# Patient Record
Sex: FEMALE | Race: WHITE | Employment: UNEMPLOYED | URBAN - METROPOLITAN AREA
[De-identification: names, ages, dates, MRNs, and addresses within clinical notes are randomized per-mention and may not be internally consistent; named-entity substitution may affect disease eponyms.]

---

## 2017-10-26 PROCEDURE — 88175 CYTOPATH C/V AUTO FLUID REDO: CPT | Performed by: FAMILY MEDICINE

## 2018-10-10 NOTE — Clinical Note
I had the pleasure of seeing Camila Combs on 2/4/2020. Please see my attached note. Hoda Cedeño MD FACSEMG--Surgery done

## 2018-11-09 PROCEDURE — 88175 CYTOPATH C/V AUTO FLUID REDO: CPT | Performed by: FAMILY MEDICINE

## 2019-09-20 PROCEDURE — 88360 TUMOR IMMUNOHISTOCHEM/MANUAL: CPT | Performed by: RADIOLOGY

## 2019-09-20 PROCEDURE — 88344 IMHCHEM/IMCYTCHM EA MLT ANTB: CPT | Performed by: RADIOLOGY

## 2019-09-20 PROCEDURE — 88305 TISSUE EXAM BY PATHOLOGIST: CPT | Performed by: RADIOLOGY

## 2019-09-25 ENCOUNTER — TELEPHONE (OUTPATIENT)
Dept: HEMATOLOGY/ONCOLOGY | Facility: HOSPITAL | Age: 49
End: 2019-09-25

## 2019-09-25 NOTE — TELEPHONE ENCOUNTER
Pt called call center to schedule with Dr. Sapphire Krueger and genetics. Patient phoned back and discussed newly diagnosed breast cancer. Introduced myself and explained my role as the breast navigator.  Explained the role of the physicians on her breast cancer car

## 2019-09-26 ENCOUNTER — NURSE NAVIGATOR ENCOUNTER (OUTPATIENT)
Dept: HEMATOLOGY/ONCOLOGY | Facility: HOSPITAL | Age: 49
End: 2019-09-26

## 2019-09-26 ENCOUNTER — OFFICE VISIT (OUTPATIENT)
Dept: HEMATOLOGY/ONCOLOGY | Facility: HOSPITAL | Age: 49
End: 2019-09-26
Attending: SPECIALIST
Payer: COMMERCIAL

## 2019-09-26 ENCOUNTER — GENETICS ENCOUNTER (OUTPATIENT)
Dept: GENETICS | Facility: HOSPITAL | Age: 49
End: 2019-09-26
Attending: SPECIALIST
Payer: COMMERCIAL

## 2019-09-26 VITALS
RESPIRATION RATE: 16 BRPM | SYSTOLIC BLOOD PRESSURE: 129 MMHG | HEART RATE: 65 BPM | BODY MASS INDEX: 27.1 KG/M2 | OXYGEN SATURATION: 99 % | HEIGHT: 65.98 IN | WEIGHT: 168.63 LBS | TEMPERATURE: 97 F | DIASTOLIC BLOOD PRESSURE: 82 MMHG

## 2019-09-26 DIAGNOSIS — C50.412 MALIGNANT NEOPLASM OF UPPER-OUTER QUADRANT OF LEFT BREAST IN FEMALE, ESTROGEN RECEPTOR POSITIVE (HCC): Primary | ICD-10-CM

## 2019-09-26 DIAGNOSIS — Z17.0 MALIGNANT NEOPLASM OF UPPER-OUTER QUADRANT OF LEFT BREAST IN FEMALE, ESTROGEN RECEPTOR POSITIVE (HCC): Primary | ICD-10-CM

## 2019-09-26 LAB
ALBUMIN SERPL-MCNC: 4.2 G/DL (ref 3.4–5)
ALBUMIN/GLOB SERPL: 1.1 {RATIO} (ref 1–2)
ALP LIVER SERPL-CCNC: 62 U/L (ref 39–100)
ALT SERPL-CCNC: 25 U/L (ref 13–56)
ANION GAP SERPL CALC-SCNC: 8 MMOL/L (ref 0–18)
AST SERPL-CCNC: 26 U/L (ref 15–37)
BASOPHILS # BLD AUTO: 0.04 X10(3) UL (ref 0–0.2)
BASOPHILS NFR BLD AUTO: 0.7 %
BILIRUB SERPL-MCNC: 0.5 MG/DL (ref 0.1–2)
BRCA1 C.185DELAG BLD/T QL: 35.2 U/ML (ref ?–38)
BUN BLD-MCNC: 13 MG/DL (ref 7–18)
BUN/CREAT SERPL: 16.9 (ref 10–20)
CALCIUM BLD-MCNC: 8.9 MG/DL (ref 8.5–10.1)
CANCER AG15-3 SERPL-ACNC: 16 U/ML (ref ?–35)
CHLORIDE SERPL-SCNC: 104 MMOL/L (ref 98–112)
CO2 SERPL-SCNC: 28 MMOL/L (ref 21–32)
CREAT BLD-MCNC: 0.77 MG/DL (ref 0.55–1.02)
DEPRECATED RDW RBC AUTO: 40.6 FL (ref 35.1–46.3)
EOSINOPHIL # BLD AUTO: 0.09 X10(3) UL (ref 0–0.7)
EOSINOPHIL NFR BLD AUTO: 1.5 %
ERYTHROCYTE [DISTWIDTH] IN BLOOD BY AUTOMATED COUNT: 13.4 % (ref 11–15)
GLOBULIN PLAS-MCNC: 3.8 G/DL (ref 2.8–4.4)
GLUCOSE BLD-MCNC: 98 MG/DL (ref 70–99)
HCT VFR BLD AUTO: 40.7 % (ref 35–48)
HGB BLD-MCNC: 13.3 G/DL (ref 12–16)
IMM GRANULOCYTES # BLD AUTO: 0.02 X10(3) UL (ref 0–1)
IMM GRANULOCYTES NFR BLD: 0.3 %
LYMPHOCYTES # BLD AUTO: 1.5 X10(3) UL (ref 1–4)
LYMPHOCYTES NFR BLD AUTO: 25.7 %
M PROTEIN MFR SERPL ELPH: 8 G/DL (ref 6.4–8.2)
MCH RBC QN AUTO: 27.3 PG (ref 26–34)
MCHC RBC AUTO-ENTMCNC: 32.7 G/DL (ref 31–37)
MCV RBC AUTO: 83.4 FL (ref 80–100)
MONOCYTES # BLD AUTO: 0.41 X10(3) UL (ref 0.1–1)
MONOCYTES NFR BLD AUTO: 7 %
NEUTROPHILS # BLD AUTO: 3.77 X10 (3) UL (ref 1.5–7.7)
NEUTROPHILS # BLD AUTO: 3.77 X10(3) UL (ref 1.5–7.7)
NEUTROPHILS NFR BLD AUTO: 64.8 %
OSMOLALITY SERPL CALC.SUM OF ELEC: 290 MOSM/KG (ref 275–295)
PLATELET # BLD AUTO: 185 10(3)UL (ref 150–450)
POTASSIUM SERPL-SCNC: 3.6 MMOL/L (ref 3.5–5.1)
RBC # BLD AUTO: 4.88 X10(6)UL (ref 3.8–5.3)
SODIUM SERPL-SCNC: 140 MMOL/L (ref 136–145)
WBC # BLD AUTO: 5.8 X10(3) UL (ref 4–11)

## 2019-09-26 PROCEDURE — 99245 OFF/OP CONSLTJ NEW/EST HI 55: CPT | Performed by: SPECIALIST

## 2019-09-26 PROCEDURE — 96040 HC GENETIC COUNSELING EA 30 MIN: CPT | Performed by: GENETIC COUNSELOR, MS

## 2019-09-26 NOTE — PROGRESS NOTES
Patient is here today for Consult with Martha Diez for breast cancer. Patient denies pain. Medication list and medical history were reviewed and updated.      Education Record    Learner:  Patient and spouse    Disease / Diagnosis: Consult Breast    Barriers

## 2019-09-26 NOTE — PROGRESS NOTES
Referring Provider: Ezra Patel MD    Additional Provider: MD Shaina Garcia MD    Reason for Referral:  Katarina Grey was referred for genetic counseling because of a new diagnosis of breast cancer.   Ms. Adan Pelaez is a 53year-old woman and BRCA2, account for the majority of hereditary breast and ovarian cancer families.   Mutations in genes other than BRCA1/2, many of which now have medical management recommendations (e.g., CHEK2, NIGEL, RAD51C, RAD51D, PALB2) are identified in 3-10% of ind the gene involved. Medical recommendations for individuals with BRCA1/2 pathogenic variants were reviewed as an example (see below).  It was also explained that for some of the genes for which testing is available, the associated cancer risks have yet to be history is limited because she has no full siblings and both of her parents are only children. Genetic testing on Ms. Saintclair Krabbe for BRCA1/2 pathogenic variants as part of a multigene panel is indicated. At the conclusion of the counseling session Ms. Saintclair Krabbe

## 2019-09-26 NOTE — PROGRESS NOTES
[facility] Report of Consultation      Patient Name: Alissa Barillas   YOB: 1970  Medical Record Number: [id number]  Consulting Physician: Annamarie Daily. Noe Camarena M.D.    Referring Physician: Robert England MD    Date of Consultation: codeine [opioid analgesics]. Review of Systems   Constitutional      No fevers, chills, night sweats, excessive fatigue. Allergic/Immunologic No reactions. Eyes                   No significant visual changes.   ENMT                  No oral pain, s our discussions today. Laboratory   No results found for this or any previous visit (from the past 48 hour(s)). Radiology   09/09/2019:  Bilateral mammography - Breast Density B:  The breasts demonstrate scattered fibroglandular densities.  There is the left breast demonstrate the Q clip at the expectedlocation. IMPRESSION:Ultrasound-guided core needle biopsy of a 1 o'clock mass within the upper outer left breast.Pathology is pending and an addendum will be submitted with radiology pathology concordanc MRI confirms a small primary and no lymph node involvement, I will recommend initial surgery. If her tumor is larger and/or involves lymph nodes (biospy may be recommended), then initial chemotherapy will be advised.            Patient is scheduled to see chito

## 2019-09-27 ENCOUNTER — OFFICE VISIT (OUTPATIENT)
Dept: SURGERY | Facility: CLINIC | Age: 49
End: 2019-09-27
Payer: COMMERCIAL

## 2019-09-27 VITALS
HEIGHT: 65.98 IN | WEIGHT: 168 LBS | BODY MASS INDEX: 27 KG/M2 | DIASTOLIC BLOOD PRESSURE: 82 MMHG | HEART RATE: 71 BPM | SYSTOLIC BLOOD PRESSURE: 130 MMHG | TEMPERATURE: 98 F

## 2019-09-27 DIAGNOSIS — Z17.0 MALIGNANT NEOPLASM OF UPPER-OUTER QUADRANT OF LEFT BREAST IN FEMALE, ESTROGEN RECEPTOR POSITIVE (HCC): Primary | ICD-10-CM

## 2019-09-27 DIAGNOSIS — C50.412 MALIGNANT NEOPLASM OF UPPER-OUTER QUADRANT OF LEFT BREAST IN FEMALE, ESTROGEN RECEPTOR POSITIVE (HCC): Primary | ICD-10-CM

## 2019-09-27 PROCEDURE — 99245 OFF/OP CONSLTJ NEW/EST HI 55: CPT | Performed by: SURGERY

## 2019-09-27 NOTE — H&P (VIEW-ONLY)
New Patient Visit Note       Active Problems      1.  Malignant neoplasm of upper-outer quadrant of left breast in female, estrogen receptor positive [facility])        Chief Complaint   Patient presents with:  Breast Problem: NP breast cancer, states left breast LEEP after abnormal colposcopy, HPV+       The family history and social history have been reviewed by me today. Family History   Problem Relation Age of Onset   • Heart Disorder Father         CAD?    • Diabetes Mother    • Hypertension Mother    • [place] [employer] Skin: Negative for color change and rash. Neurological: Negative for tremors, syncope and weakness. Hematological: Negative for adenopathy. Does not bruise/bleed easily. Psychiatric/Behavioral: Negative for behavioral problems and sleep disturbance. Right axillary: No pectoral and no lateral adenopathy present. Left axillary: No pectoral and no lateral adenopathy present. Right: No supraclavicular adenopathy present. Left: No supraclavicular adenopathy present.    Neurological: there is a 0.7 x 0.3 x 0.7 cm oval, circumscribed, hypoechoic parallel mass with internal  echogenicity, possibly calcification versus debris. Visualized axillary lymph nodes demonstrate  benign appearance.   PRE-PROCEDURAL CONSULTATION: Details of the proc Estrogen Receptor   -   SP1 85% Positive Cells Strong Positive   Progesterone Receptor   -   16 75% Positive Cells Strong Positive   KI-67   -   MM1 20% Positive Cells High   Her2   -   CB11 3+  (90%) Positive          Assessment   Malignant neoplasm of up We discussed the role of radiation therapy and if she opts for lumpectomy, she will need radiation therapy and the length and course of radiation therapy will depend on the final pathology.   We also discussed situations where radiation therapy is needed af

## 2019-09-27 NOTE — H&P
New Patient Visit Note       Active Problems      1.  Malignant neoplasm of upper-outer quadrant of left breast in female, estrogen receptor positive [facility])        Chief Complaint   Patient presents with:  Breast Problem: NP breast cancer, states left breast abnormal colposcopy, HPV+       The family history and social history have been reviewed by me today. Family History   Problem Relation Age of Onset   • Heart Disorder Father         CAD?    • Diabetes Mother    • Hypertension Mother    • Other (Hypothyr and rash. Neurological: Negative for tremors, syncope and weakness. Hematological: Negative for adenopathy. Does not bruise/bleed easily. Psychiatric/Behavioral: Negative for behavioral problems and sleep disturbance.        Physical Findings    axillary: No pectoral and no lateral adenopathy present. Right: No supraclavicular adenopathy present. Left: No supraclavicular adenopathy present. Neurological: She is alert and oriented to person, place, and time. Skin: Skin is intact.  Esther Oquendo internal  echogenicity, possibly calcification versus debris. Visualized axillary lymph nodes demonstrate  benign appearance.   PRE-PROCEDURAL CONSULTATION: Details of the procedure and possible limitations and complications  were discussed with the patient Positive   KI-67   -   MM1 20% Positive Cells High   Her2   -   CB11 3+  (90%) Positive          Assessment   Malignant neoplasm of upper-outer quadrant of left breast in female, estrogen receptor positive ([facility] [address]  (primary encounter diagnosis)      Plan   I determine if she would benefit from neoadjuvant chemotherapy or chemotherapy after surgery. Because she is HER-2 positive, she will require some form of chemotherapy. Due to her age, she does qualify for genetic testing.   She has already met with the

## 2019-09-30 ENCOUNTER — TELEPHONE (OUTPATIENT)
Dept: SURGERY | Facility: CLINIC | Age: 49
End: 2019-09-30

## 2019-09-30 NOTE — TELEPHONE ENCOUNTER
Pt. Came into the clinic stating that she didn't have an appointment today, but she wanted to inform our office about the chest pressure that she has been having. I asked Pt. If she has shortness of breath, dizzy or lightheaded.  Pt. Sasha Jacobs and stated that

## 2019-10-01 ENCOUNTER — OFFICE VISIT (OUTPATIENT)
Dept: SURGERY | Facility: CLINIC | Age: 49
End: 2019-10-01
Payer: COMMERCIAL

## 2019-10-01 ENCOUNTER — NURSE NAVIGATOR ENCOUNTER (OUTPATIENT)
Dept: HEMATOLOGY/ONCOLOGY | Facility: HOSPITAL | Age: 49
End: 2019-10-01

## 2019-10-01 VITALS — BODY MASS INDEX: 27.19 KG/M2 | WEIGHT: 169.19 LBS | HEIGHT: 65.98 IN

## 2019-10-01 DIAGNOSIS — Z51.81 ENCOUNTER FOR MONITORING CARDIOTOXIC DRUG THERAPY: Primary | ICD-10-CM

## 2019-10-01 DIAGNOSIS — C50.412 MALIGNANT NEOPLASM OF UPPER-OUTER QUADRANT OF LEFT BREAST IN FEMALE, ESTROGEN RECEPTOR POSITIVE (HCC): Primary | ICD-10-CM

## 2019-10-01 DIAGNOSIS — Z79.899 ENCOUNTER FOR MONITORING CARDIOTOXIC DRUG THERAPY: Primary | ICD-10-CM

## 2019-10-01 DIAGNOSIS — Z17.0 MALIGNANT NEOPLASM OF UPPER-OUTER QUADRANT OF LEFT BREAST IN FEMALE, ESTROGEN RECEPTOR POSITIVE (HCC): Primary | ICD-10-CM

## 2019-10-01 PROCEDURE — 99243 OFF/OP CNSLTJ NEW/EST LOW 30: CPT | Performed by: SURGERY

## 2019-10-01 NOTE — PROGRESS NOTES
Met with patient following plastic surgery appointment. We did discuss her in conference this morning, for review of MRI. Based on report, it looks like we need to look at the right side by US and possible biopsy to ensure a benign process on R side.  Our r

## 2019-10-01 NOTE — CONSULTS
New Patient Consultation    This is the first visit for this 53year old female who presents to discuss reconstructive options following surgery for breast cancer. History of Present Illness:    The patient is a 53year old female who presents with a lef ONLY      Family History:   Family History   Problem Relation Age of Onset   • Heart Disorder Father         CHF   • Diabetes Mother    • Hypertension Mother    • Other (Hypothyroidism) Mother    • Psychiatric Daughter 16        Depression/anxiety   • Othe blood in urine, or vaginal/penile discharge. Skin:  Then patient denies rash, itching, skin lesions, dry skin, change in skin color or change in moles, sunburn with blistering.     Hematologic/Lymphatic:  The patient denies easily bruising or bleeding, p recent biopsy. Palpation is limited secondary to discomfort. Measurements:  sternal notch to nipple 29cm, nipple to inframammary fold 14cm, base diameter 16cm. The skin, nipple, and areola appear normal.  There is no nipple retraction or discharge. exchange, and need for further surgery were reviewed. The expected post-operative course was discussed including the need for activity limitation, drains, and suture removal.  The recommended FDA surveillance protocol for silicone implants was reviewed.   Rukhsana Calloway

## 2019-10-02 ENCOUNTER — TELEPHONE (OUTPATIENT)
Dept: SURGERY | Facility: CLINIC | Age: 49
End: 2019-10-02

## 2019-10-02 ENCOUNTER — TELEPHONE (OUTPATIENT)
Dept: HEMATOLOGY/ONCOLOGY | Facility: HOSPITAL | Age: 49
End: 2019-10-02

## 2019-10-02 ENCOUNTER — TELEPHONE (OUTPATIENT)
Dept: MAMMOGRAPHY | Facility: HOSPITAL | Age: 49
End: 2019-10-02

## 2019-10-02 DIAGNOSIS — R92.8 ABNORMAL FINDINGS ON DIAGNOSTIC IMAGING OF BREAST: ICD-10-CM

## 2019-10-02 DIAGNOSIS — C50.412 MALIGNANT NEOPLASM OF UPPER-OUTER QUADRANT OF LEFT BREAST IN FEMALE, ESTROGEN RECEPTOR POSITIVE (HCC): Primary | ICD-10-CM

## 2019-10-02 DIAGNOSIS — Z17.0 MALIGNANT NEOPLASM OF UPPER-OUTER QUADRANT OF LEFT BREAST IN FEMALE, ESTROGEN RECEPTOR POSITIVE (HCC): Primary | ICD-10-CM

## 2019-10-02 NOTE — TELEPHONE ENCOUNTER
I reviewed the patient's MRI with Dr. Minor Ghsoh from radiology. His recommendation is for a 2 site right breast MRI guided biopsy. He does not feel the second look ultrasound would be of much benefit.   I discussed this with the patient, and she is agreeab

## 2019-10-02 NOTE — TELEPHONE ENCOUNTER
Spoke with patient regarding 2 site MRI guided right breast biopsy due Friday. Procedure explained and all questions answered. Pt verbalized understanding. Pt has a known LB IDC.

## 2019-10-03 ENCOUNTER — GENETICS ENCOUNTER (OUTPATIENT)
Dept: HEMATOLOGY/ONCOLOGY | Facility: HOSPITAL | Age: 49
End: 2019-10-03

## 2019-10-03 ENCOUNTER — HOSPITAL ENCOUNTER (OUTPATIENT)
Dept: CV DIAGNOSTICS | Facility: HOSPITAL | Age: 49
Discharge: HOME | End: 2019-10-03
Attending: SPECIALIST
Payer: COMMERCIAL

## 2019-10-03 DIAGNOSIS — Z51.81 ENCOUNTER FOR MONITORING CARDIOTOXIC DRUG THERAPY: ICD-10-CM

## 2019-10-03 DIAGNOSIS — Z79.899 ENCOUNTER FOR MONITORING CARDIOTOXIC DRUG THERAPY: ICD-10-CM

## 2019-10-03 PROCEDURE — 93306 TTE W/DOPPLER COMPLETE: CPT | Performed by: SPECIALIST

## 2019-10-03 NOTE — PROGRESS NOTES
Referring Provider:       Colette Leal MD     Additional Provider:     MD Ana Arenas MD    Reason for Referral:  Balta Silver had genetic testing performed on 9/26/19 because of a new diagnosi MEN1, MLH1, MSH2 (including EPCAM rearrangement testing), MSH3, MSH6, MUTYH, NBN, NF1, NTHL1 (sequencing only), PALB2, PDGRFA, PMS2, POLD1, POLE, PTEN, RAD50, RAD51C, RAD51D, SDHA (sequencing only), SDHB, SDHC, SDHD, SMAD4, SMARCA4, STK11, TP53, TSC1, TSC2

## 2019-10-04 ENCOUNTER — HOSPITAL ENCOUNTER (OUTPATIENT)
Dept: MAMMOGRAPHY | Facility: HOSPITAL | Age: 49
Discharge: HOME | End: 2019-10-04
Attending: SURGERY
Payer: COMMERCIAL

## 2019-10-04 ENCOUNTER — HOSPITAL ENCOUNTER (OUTPATIENT)
Dept: MRI IMAGING | Facility: HOSPITAL | Age: 49
Discharge: HOME | End: 2019-10-04
Attending: SURGERY
Payer: COMMERCIAL

## 2019-10-04 DIAGNOSIS — Z17.0 MALIGNANT NEOPLASM OF UPPER-OUTER QUADRANT OF LEFT BREAST IN FEMALE, ESTROGEN RECEPTOR POSITIVE (HCC): ICD-10-CM

## 2019-10-04 DIAGNOSIS — C50.412 MALIGNANT NEOPLASM OF UPPER-OUTER QUADRANT OF LEFT BREAST IN FEMALE, ESTROGEN RECEPTOR POSITIVE (HCC): ICD-10-CM

## 2019-10-04 DIAGNOSIS — R92.8 ABNORMAL FINDINGS ON DIAGNOSTIC IMAGING OF BREAST: ICD-10-CM

## 2019-10-04 PROCEDURE — 19086 BX BREAST ADD LESION MR IMAG: CPT | Performed by: SURGERY

## 2019-10-04 PROCEDURE — 19085 BX BREAST 1ST LESION MR IMAG: CPT | Performed by: SURGERY

## 2019-10-04 PROCEDURE — A9575 INJ GADOTERATE MEGLUMI 0.1ML: HCPCS | Performed by: SURGERY

## 2019-10-04 PROCEDURE — 88305 TISSUE EXAM BY PATHOLOGIST: CPT | Performed by: SURGERY

## 2019-10-04 PROCEDURE — 77065 DX MAMMO INCL CAD UNI: CPT | Performed by: SURGERY

## 2019-10-04 NOTE — IMAGING NOTE
Pt arrived with in laws. Procedure explained throughout and all questions answered. Consent and discharge paperwork signed by Gibson Standard. IV placed to right antecubital without difficulty. Pt placed prone in comfortable position on MRI table.  Right b

## 2019-10-07 ENCOUNTER — SOCIAL WORK SERVICES (OUTPATIENT)
Dept: HEMATOLOGY/ONCOLOGY | Facility: HOSPITAL | Age: 49
End: 2019-10-07

## 2019-10-07 ENCOUNTER — TELEPHONE (OUTPATIENT)
Dept: SURGERY | Facility: CLINIC | Age: 49
End: 2019-10-07

## 2019-10-07 DIAGNOSIS — C50.912 MALIGNANT NEOPLASM OF LEFT FEMALE BREAST, UNSPECIFIED ESTROGEN RECEPTOR STATUS, UNSPECIFIED SITE OF BREAST (HCC): Primary | ICD-10-CM

## 2019-10-07 NOTE — PROGRESS NOTES
SW completed and faxed paperwork to [facility] after speaking with patient to confirm intermittent leave dates. Paperwork faxed to [facility] at [phone].

## 2019-10-09 ENCOUNTER — NURSE ONLY (OUTPATIENT)
Dept: HEMATOLOGY/ONCOLOGY | Facility: HOSPITAL | Age: 49
End: 2019-10-09
Attending: SPECIALIST
Payer: COMMERCIAL

## 2019-10-09 ENCOUNTER — TELEPHONE (OUTPATIENT)
Dept: SURGERY | Facility: CLINIC | Age: 49
End: 2019-10-09

## 2019-10-09 DIAGNOSIS — C50.412 MALIGNANT NEOPLASM OF UPPER-OUTER QUADRANT OF LEFT BREAST IN FEMALE, ESTROGEN RECEPTOR POSITIVE (HCC): Primary | ICD-10-CM

## 2019-10-09 DIAGNOSIS — Z17.0 MALIGNANT NEOPLASM OF UPPER-OUTER QUADRANT OF LEFT BREAST IN FEMALE, ESTROGEN RECEPTOR POSITIVE (HCC): Primary | ICD-10-CM

## 2019-10-09 PROCEDURE — 99211 OFF/OP EST MAY X REQ PHY/QHP: CPT

## 2019-10-09 RX ORDER — PROCHLORPERAZINE MALEATE 10 MG
10 TABLET ORAL EVERY 6 HOURS PRN
Qty: 30 TABLET | Refills: 3 | Status: SHIPPED | OUTPATIENT
Start: 2019-10-09 | End: 2020-02-25 | Stop reason: ALTCHOICE

## 2019-10-09 RX ORDER — ONDANSETRON HYDROCHLORIDE 8 MG/1
8 TABLET, FILM COATED ORAL EVERY 8 HOURS PRN
Qty: 30 TABLET | Refills: 3 | Status: SHIPPED | OUTPATIENT
Start: 2019-10-09 | End: 2020-02-25 | Stop reason: ALTCHOICE

## 2019-10-09 NOTE — PROGRESS NOTES
Chemotherapy Education    Learner:  Patient    Barriers / Limitations:  Denial    Chemotherapy education goals:  · Learn the drug names  · Administration schedule  · Routes of administration  · Treatment setting    Drug names:  Doxetaxel, carboplatin, tras the Bladder and Kidneys:    Function of the kidneys and bladder:  Needs reinforcement    Signs / symptoms of hemorrhagic cystitis:  Needs reinforcement    Suggested fluid intake:  Needs reinforcement    Notify MD/RN if blood appears in urine or if you have Sheet  [employer] Information sheet    Patient was given ample opportunity to ask questions. All questions and concerns addressed. We discussed self care techniques, symptom management, fluid, diet, and activities.

## 2019-10-10 ENCOUNTER — HOSPITAL ENCOUNTER (OUTPATIENT)
Facility: HOSPITAL | Age: 49
Setting detail: HOSPITAL OUTPATIENT SURGERY
Discharge: HOME | End: 2019-10-10
Attending: SURGERY | Admitting: SURGERY
Payer: COMMERCIAL

## 2019-10-10 ENCOUNTER — APPOINTMENT (OUTPATIENT)
Dept: GENERAL RADIOLOGY | Facility: HOSPITAL | Age: 49
End: 2019-10-10
Attending: SURGERY
Payer: COMMERCIAL

## 2019-10-10 ENCOUNTER — ANESTHESIA EVENT (OUTPATIENT)
Dept: SURGERY | Facility: HOSPITAL | Age: 49
End: 2019-10-10
Payer: COMMERCIAL

## 2019-10-10 ENCOUNTER — ANESTHESIA (OUTPATIENT)
Dept: SURGERY | Facility: HOSPITAL | Age: 49
End: 2019-10-10
Payer: COMMERCIAL

## 2019-10-10 VITALS
WEIGHT: 164 LBS | SYSTOLIC BLOOD PRESSURE: 115 MMHG | BODY MASS INDEX: 26.36 KG/M2 | HEIGHT: 66 IN | RESPIRATION RATE: 16 BRPM | DIASTOLIC BLOOD PRESSURE: 73 MMHG | OXYGEN SATURATION: 96 % | HEART RATE: 67 BPM | TEMPERATURE: 98 F

## 2019-10-10 DIAGNOSIS — C50.912 MALIGNANT NEOPLASM OF LEFT FEMALE BREAST, UNSPECIFIED ESTROGEN RECEPTOR STATUS, UNSPECIFIED SITE OF BREAST (HCC): ICD-10-CM

## 2019-10-10 PROCEDURE — 71045 X-RAY EXAM CHEST 1 VIEW: CPT | Performed by: SURGERY

## 2019-10-10 PROCEDURE — 81025 URINE PREGNANCY TEST: CPT | Performed by: SURGERY

## 2019-10-10 PROCEDURE — 77001 FLUOROGUIDE FOR VEIN DEVICE: CPT | Performed by: SURGERY

## 2019-10-10 PROCEDURE — 0JH60WZ INSERTION OF TOTALLY IMPLANTABLE VASCULAR ACCESS DEVICE INTO CHEST SUBCUTANEOUS TISSUE AND FASCIA, OPEN APPROACH: ICD-10-PCS | Performed by: SURGERY

## 2019-10-10 PROCEDURE — 05H533Z INSERTION OF INFUSION DEVICE INTO RIGHT SUBCLAVIAN VEIN, PERCUTANEOUS APPROACH: ICD-10-PCS | Performed by: SURGERY

## 2019-10-10 PROCEDURE — B5161ZA FLUOROSCOPY OF RIGHT SUBCLAVIAN VEIN USING LOW OSMOLAR CONTRAST, GUIDANCE: ICD-10-PCS | Performed by: SURGERY

## 2019-10-10 DEVICE — POWERPORT CLEARVUE SLIM WITH SMOOTH SEPTUM, 8F POLYURETHANE CATHETER, SUTURE PLUGS, INTERMEDIATE KIT
Type: IMPLANTABLE DEVICE | Site: CHEST | Status: FUNCTIONAL
Brand: POWERPORT CLEARVUE

## 2019-10-10 RX ORDER — CEFAZOLIN SODIUM/WATER 2 G/20 ML
2 SYRINGE (ML) INTRAVENOUS ONCE
Status: COMPLETED | OUTPATIENT
Start: 2019-10-10 | End: 2019-10-10

## 2019-10-10 RX ORDER — HYDROCODONE BITARTRATE AND ACETAMINOPHEN 5; 325 MG/1; MG/1
2 TABLET ORAL AS NEEDED
Status: DISCONTINUED | OUTPATIENT
Start: 2019-10-10 | End: 2019-10-10

## 2019-10-10 RX ORDER — METOCLOPRAMIDE HYDROCHLORIDE 5 MG/ML
10 INJECTION INTRAMUSCULAR; INTRAVENOUS AS NEEDED
Status: DISCONTINUED | OUTPATIENT
Start: 2019-10-10 | End: 2019-10-10

## 2019-10-10 RX ORDER — ONDANSETRON 2 MG/ML
4 INJECTION INTRAMUSCULAR; INTRAVENOUS AS NEEDED
Status: DISCONTINUED | OUTPATIENT
Start: 2019-10-10 | End: 2019-10-10

## 2019-10-10 RX ORDER — HYDROCODONE BITARTRATE AND ACETAMINOPHEN 5; 325 MG/1; MG/1
1 TABLET ORAL AS NEEDED
Status: DISCONTINUED | OUTPATIENT
Start: 2019-10-10 | End: 2019-10-10

## 2019-10-10 RX ORDER — NALOXONE HYDROCHLORIDE 0.4 MG/ML
80 INJECTION, SOLUTION INTRAMUSCULAR; INTRAVENOUS; SUBCUTANEOUS AS NEEDED
Status: DISCONTINUED | OUTPATIENT
Start: 2019-10-10 | End: 2019-10-10

## 2019-10-10 RX ORDER — ACETAMINOPHEN 500 MG
1000 TABLET ORAL ONCE
Status: DISCONTINUED | OUTPATIENT
Start: 2019-10-10 | End: 2019-10-10 | Stop reason: HOSPADM

## 2019-10-10 RX ORDER — SODIUM CHLORIDE, SODIUM LACTATE, POTASSIUM CHLORIDE, CALCIUM CHLORIDE 600; 310; 30; 20 MG/100ML; MG/100ML; MG/100ML; MG/100ML
INJECTION, SOLUTION INTRAVENOUS CONTINUOUS
Status: DISCONTINUED | OUTPATIENT
Start: 2019-10-10 | End: 2019-10-10

## 2019-10-10 RX ORDER — BUPIVACAINE HYDROCHLORIDE AND EPINEPHRINE 5; 5 MG/ML; UG/ML
INJECTION, SOLUTION EPIDURAL; INTRACAUDAL; PERINEURAL AS NEEDED
Status: DISCONTINUED | OUTPATIENT
Start: 2019-10-10 | End: 2019-10-10 | Stop reason: HOSPADM

## 2019-10-10 RX ORDER — LIDOCAINE HYDROCHLORIDE 10 MG/ML
INJECTION, SOLUTION INFILTRATION; PERINEURAL AS NEEDED
Status: DISCONTINUED | OUTPATIENT
Start: 2019-10-10 | End: 2019-10-10 | Stop reason: HOSPADM

## 2019-10-10 NOTE — ANESTHESIA POSTPROCEDURE EVALUATION
[address] [facility] Patient Status:  Hospital Outpatient Surgery   Age/Gender 53year old female MRN [id number]   [facility] SURGERY Attending Chioma Dhillon MD   Hosp Day # 0 PCP Shelly Petersen MD       Anesthesia Pos

## 2019-10-10 NOTE — INTERVAL H&P NOTE
Pre-op Diagnosis: Malignant neoplasm of left female breast, unspecified estrogen receptor status, unspecified site of breast [facility] [C50.912]    The above referenced H&P was reviewed by Antonio Durant MD on 10/10/2019, the patient was examined and no s

## 2019-10-10 NOTE — OPERATIVE REPORT
DATE OF OPERATION:  10/10/2019  PREOPERATIVE DIAGNOSIS: Left breast invasive ductal carcinoma, HER-2 positive, upper outer quadrant  POSTOPERATIVE DIAGNOSIS:  Left breast invasive ductal carcinoma, HER-2 positive, upper outer quadrant   PROCEDURE PERFORMED were passed on the field and passed down the wire. The dilator and wire were then withdrawn, leaving the sheath in place. The catheter, which had already been flushed with heplock, was passed down the sheath.  The sheath was broken away, leaving the cathete

## 2019-10-10 NOTE — ANESTHESIA PREPROCEDURE EVALUATION
PRE-OP EVALUATION    Patient Name: Nino Oneil    Pre-op Diagnosis: Malignant neoplasm of left female breast, unspecified estrogen receptor status, unspecified site of [facility] ([facility] [C50.912]    Procedure(s):  PLACEMENT OF RIGHT SIDE PILY CATH    Surg Pulmonary                           Neuro/Psych      (+) depression                        Patient Active Problem List:     Hypothyroidism     HDL lipoprotein deficiency     Depression     Chronic neck pain     Malignant neoplasm of upper-outer quadrant

## 2019-10-10 NOTE — OR NURSING
Instructions given and questions addressed. Power port info packet given and power port sticker put into chart record. Iv removed with cannula intact. Voided 1x.

## 2019-10-11 ENCOUNTER — TELEPHONE (OUTPATIENT)
Dept: HEMATOLOGY/ONCOLOGY | Facility: HOSPITAL | Age: 49
End: 2019-10-11

## 2019-10-11 RX ORDER — DIPHENHYDRAMINE HCL 25 MG
25 CAPSULE ORAL ONCE
Status: CANCELLED | OUTPATIENT
Start: 2019-10-14

## 2019-10-11 RX ORDER — ACETAMINOPHEN 325 MG/1
650 TABLET ORAL ONCE
Status: CANCELLED | OUTPATIENT
Start: 2019-10-14

## 2019-10-11 NOTE — TELEPHONE ENCOUNTER
Spoke with patient in regards to flu shot, okay to get today per Dr. Sapphire Krueger. Pt verbalizes understanding. First chemotherapy scheduled for Monday.

## 2019-10-11 NOTE — PROGRESS NOTES
[facility] [place] Progress Note      Patient Name: Eris Recinos   YOB: 1970  Medical Record Number: [id number]  Attending Physician: Zhane Norris M.D.      Date of Visit: 10/14/2019       Chief Complaint  Invasive ductal carcinoma, sided (as above); hypothyroidism; depression. Past Surgical History (historical data, reviewed by physician)   x 2; LEEP after colposcopy (HPV+).      Family History (historical data, reviewed by physician)  Maternal grandfather with colorectal speech; verbalizes understanding of our discussions today.       Laboratory   Recent Results (from the past 24 hour(s))   COMP METABOLIC PANEL (14)    Collection Time: 10/14/19  8:28 AM   Result Value Ref Range    Glucose 93 70 - 99 mg/dL    Sodium 142 136 tissue. Background Parenchymal Enhancement:  Mild. RIGHT BREAST:In the 12:00 right breast, posterior depth, there is 1.6 cm mass versus focal area of non massenhancement which demonstrates associated T2 signal, as well as rapid initial and persistent delayed Assessment Category 4: Suspicious. Pathology   10/04/2019:  A.   Right breast, 12 o'clock position, MRI guided needle core biopsy for enhancement:Fragments of breast tissue with fibrocystic changes including microcyst formation, fibroadenomatoid stromal

## 2019-10-14 ENCOUNTER — OFFICE VISIT (OUTPATIENT)
Dept: HEMATOLOGY/ONCOLOGY | Facility: HOSPITAL | Age: 49
End: 2019-10-14
Attending: SPECIALIST
Payer: COMMERCIAL

## 2019-10-14 ENCOUNTER — SOCIAL WORK SERVICES (OUTPATIENT)
Dept: HEMATOLOGY/ONCOLOGY | Facility: HOSPITAL | Age: 49
End: 2019-10-14

## 2019-10-14 VITALS
OXYGEN SATURATION: 98 % | WEIGHT: 166.81 LBS | HEART RATE: 75 BPM | DIASTOLIC BLOOD PRESSURE: 62 MMHG | BODY MASS INDEX: 26.49 KG/M2 | RESPIRATION RATE: 18 BRPM | SYSTOLIC BLOOD PRESSURE: 106 MMHG | HEIGHT: 66.54 IN | TEMPERATURE: 98 F

## 2019-10-14 DIAGNOSIS — F32.89 OTHER DEPRESSION: ICD-10-CM

## 2019-10-14 DIAGNOSIS — Z17.0 MALIGNANT NEOPLASM OF UPPER-OUTER QUADRANT OF LEFT BREAST IN FEMALE, ESTROGEN RECEPTOR POSITIVE (HCC): Primary | ICD-10-CM

## 2019-10-14 DIAGNOSIS — E03.9 ACQUIRED HYPOTHYROIDISM: Primary | ICD-10-CM

## 2019-10-14 DIAGNOSIS — C50.412 MALIGNANT NEOPLASM OF UPPER-OUTER QUADRANT OF LEFT BREAST IN FEMALE, ESTROGEN RECEPTOR POSITIVE (HCC): ICD-10-CM

## 2019-10-14 DIAGNOSIS — Z17.0 MALIGNANT NEOPLASM OF UPPER-OUTER QUADRANT OF LEFT BREAST IN FEMALE, ESTROGEN RECEPTOR POSITIVE (HCC): ICD-10-CM

## 2019-10-14 DIAGNOSIS — C50.412 MALIGNANT NEOPLASM OF UPPER-OUTER QUADRANT OF LEFT BREAST IN FEMALE, ESTROGEN RECEPTOR POSITIVE (HCC): Primary | ICD-10-CM

## 2019-10-14 PROCEDURE — 96375 TX/PRO/DX INJ NEW DRUG ADDON: CPT

## 2019-10-14 PROCEDURE — 96413 CHEMO IV INFUSION 1 HR: CPT

## 2019-10-14 PROCEDURE — 80053 COMPREHEN METABOLIC PANEL: CPT

## 2019-10-14 PROCEDURE — 99215 OFFICE O/P EST HI 40 MIN: CPT | Performed by: SPECIALIST

## 2019-10-14 PROCEDURE — 85025 COMPLETE CBC W/AUTO DIFF WBC: CPT

## 2019-10-14 PROCEDURE — 96417 CHEMO IV INFUS EACH ADDL SEQ: CPT

## 2019-10-14 RX ORDER — DIPHENHYDRAMINE HCL 25 MG
25 CAPSULE ORAL ONCE
Status: COMPLETED | OUTPATIENT
Start: 2019-10-14 | End: 2019-10-14

## 2019-10-14 RX ORDER — ACETAMINOPHEN 325 MG/1
650 TABLET ORAL ONCE
Status: COMPLETED | OUTPATIENT
Start: 2019-10-14 | End: 2019-10-14

## 2019-10-14 RX ADMIN — DIPHENHYDRAMINE HCL 25 MG: 25 MG CAPSULE ORAL at 09:39:00

## 2019-10-14 RX ADMIN — ACETAMINOPHEN 650 MG: 325 TABLET ORAL at 09:39:00

## 2019-10-14 NOTE — PROGRESS NOTES
Pt here for C1D1.   Arrives Ambulating independently, accompanied by Self and Spouse           Patient denies possible pregnancy since last therapy cycle: Yes    Modifications in dose or schedule: No     Frequency of blood return and site check throughout a

## 2019-10-14 NOTE — PATIENT INSTRUCTIONS
Anti-nausea Regimen:    Zofran- every 8 hours  Compazine- Every 6 hours    Alternate between compazine and Zofran-- 4 hours after IV zofran is given to you today you can take a compazine tablet.  Then 4 hours later take zofran again, then compazine, etc.  I

## 2019-10-14 NOTE — PROGRESS NOTES
SW Met with patient to introduce self and provide information on On license of [facility] and other local support. Patient reports that she has two daughters, 16 and 25.  She reports that her 16year old is emotional. SW offered to speak with daughter if she would l

## 2019-10-14 NOTE — PROGRESS NOTES
Patient is here today for follow up with Markos Petersen for Breast Cancer. C1D1 TCHP. Patient denies pain. Medication list and medical history were reviewed and updated.      Education Record    Learner:  Patient and spouse    Disease / Diagnosis: Breast Cancer

## 2019-10-15 ENCOUNTER — OFFICE VISIT (OUTPATIENT)
Dept: HEMATOLOGY/ONCOLOGY | Facility: HOSPITAL | Age: 49
End: 2019-10-15
Attending: SPECIALIST
Payer: COMMERCIAL

## 2019-10-15 DIAGNOSIS — Z17.0 MALIGNANT NEOPLASM OF UPPER-OUTER QUADRANT OF LEFT BREAST IN FEMALE, ESTROGEN RECEPTOR POSITIVE (HCC): Primary | ICD-10-CM

## 2019-10-15 DIAGNOSIS — C50.412 MALIGNANT NEOPLASM OF UPPER-OUTER QUADRANT OF LEFT BREAST IN FEMALE, ESTROGEN RECEPTOR POSITIVE (HCC): Primary | ICD-10-CM

## 2019-10-15 PROCEDURE — 96372 THER/PROPH/DIAG INJ SC/IM: CPT

## 2019-10-15 NOTE — PROGRESS NOTES
[facility] Progress Note      Patient Name: Talia Oquendo   YOB: 1970  Medical Record Number: [id number]  Attending Physician: Fady Funk. August Henriquez M.D.      Date of Visit: 10/21/2019      Chief Complaint  Invasive ductal carcinoma, anything for it. She had some mild transient GERD but none now. No nausea or vomiting. No peripheral neuropathy. Performance Status   Karnofsky 100% - Normal, no complaints.     Past Medical History (historical data, reviewed by physician)  Breast cance Examination   Constitutional  Well developed and well nourished; in no apparent distress; appears close to chronological age. Head   Normocephalic and atraumatic. Eyes   Conjunctiva clear; sclera anicteric.   ENMT   External nose normal; external ears nor 1.50 - 7.70 x10 (3) uL    Neutrophil Absolute 3.11 1.50 - 7.70 x10(3) uL    Lymphocyte Absolute 1.30 1.00 - 4.00 x10(3) uL    Monocyte Absolute 0.29 0.10 - 1.00 x10(3) uL    Eosinophil Absolute 0.08 0.00 - 0.70 x10(3) uL    Basophil Absolute 0.03 0.00 - 0.

## 2019-10-15 NOTE — PROGRESS NOTES
Education Record    Learner:  Patient    Disease / Diagnosis: first udenyca injection - explained purpose of medication, potential bone pain - patient says she is going to start taking claritin today.      Barriers / Limitations:  None   Comments:    Method

## 2019-10-21 ENCOUNTER — APPOINTMENT (OUTPATIENT)
Dept: HEMATOLOGY/ONCOLOGY | Facility: HOSPITAL | Age: 49
End: 2019-10-21
Attending: SPECIALIST
Payer: COMMERCIAL

## 2019-10-21 VITALS
RESPIRATION RATE: 16 BRPM | HEIGHT: 66.54 IN | BODY MASS INDEX: 26.2 KG/M2 | WEIGHT: 165 LBS | DIASTOLIC BLOOD PRESSURE: 62 MMHG | HEART RATE: 79 BPM | OXYGEN SATURATION: 99 % | SYSTOLIC BLOOD PRESSURE: 106 MMHG | TEMPERATURE: 98 F

## 2019-10-21 DIAGNOSIS — C50.412 MALIGNANT NEOPLASM OF UPPER-OUTER QUADRANT OF LEFT BREAST IN FEMALE, ESTROGEN RECEPTOR POSITIVE (HCC): Primary | ICD-10-CM

## 2019-10-21 DIAGNOSIS — F32.89 OTHER DEPRESSION: ICD-10-CM

## 2019-10-21 DIAGNOSIS — K21.9 GERD WITHOUT ESOPHAGITIS: ICD-10-CM

## 2019-10-21 DIAGNOSIS — E03.9 ACQUIRED HYPOTHYROIDISM: ICD-10-CM

## 2019-10-21 DIAGNOSIS — K52.1 DIARRHEA DUE TO DRUG: ICD-10-CM

## 2019-10-21 DIAGNOSIS — Z17.0 MALIGNANT NEOPLASM OF UPPER-OUTER QUADRANT OF LEFT BREAST IN FEMALE, ESTROGEN RECEPTOR POSITIVE (HCC): Primary | ICD-10-CM

## 2019-10-21 PROCEDURE — 99215 OFFICE O/P EST HI 40 MIN: CPT | Performed by: SPECIALIST

## 2019-11-01 NOTE — PROGRESS NOTES
[facility] Progress Note      Patient Name: Talia Oquendo   YOB: 1970  Medical Record Number: [id number]  Attending Physician: Fady Funk. August Henriquez M.D.      Date of Visit: 11/4/2019      Chief Complaint  Invasive ductal carcinoma, l denies any current GERD. No nausea or vomiting. No peripheral neuropathy. Reports good appetite and energy level. Performance Status   Karnofsky 100% - Normal, no complaints.     Past Medical History (historical data, reviewed by physician)  Breast canc - Resp Rate: --  SpO2: 97 % (11/04 0930)    Physical Examination   Constitutional  Well developed and well nourished; in no apparent distress; appears close to chronological age. Head   Normocephalic and atraumatic.   Eyes   Conjunctiva clear; sclera anict 450.0 10(3)uL    MCV 86.4 80.0 - 100.0 fL    MCH 27.4 26.0 - 34.0 pg    MCHC 31.7 31.0 - 37.0 g/dL    RDW 14.6 11.0 - 15.0 %    RDW-SD 44.2 35.1 - 46.3 fL    Neutrophil Absolute Prelim 2.93 1.50 - 7.70 x10 (3) uL    Neutrophil Absolute 2.93 1.50 - 7.70 x10

## 2019-11-04 ENCOUNTER — OFFICE VISIT (OUTPATIENT)
Dept: HEMATOLOGY/ONCOLOGY | Facility: HOSPITAL | Age: 49
End: 2019-11-04
Attending: SPECIALIST
Payer: COMMERCIAL

## 2019-11-04 VITALS
TEMPERATURE: 98 F | WEIGHT: 168 LBS | DIASTOLIC BLOOD PRESSURE: 72 MMHG | OXYGEN SATURATION: 97 % | HEART RATE: 76 BPM | RESPIRATION RATE: 18 BRPM | SYSTOLIC BLOOD PRESSURE: 105 MMHG | HEIGHT: 66.54 IN | BODY MASS INDEX: 26.68 KG/M2

## 2019-11-04 DIAGNOSIS — C50.412 MALIGNANT NEOPLASM OF UPPER-OUTER QUADRANT OF LEFT BREAST IN FEMALE, ESTROGEN RECEPTOR POSITIVE (HCC): Primary | ICD-10-CM

## 2019-11-04 DIAGNOSIS — E03.9 ACQUIRED HYPOTHYROIDISM: ICD-10-CM

## 2019-11-04 DIAGNOSIS — F32.89 OTHER DEPRESSION: ICD-10-CM

## 2019-11-04 DIAGNOSIS — Z17.0 MALIGNANT NEOPLASM OF UPPER-OUTER QUADRANT OF LEFT BREAST IN FEMALE, ESTROGEN RECEPTOR POSITIVE (HCC): Primary | ICD-10-CM

## 2019-11-04 DIAGNOSIS — K52.1 DIARRHEA DUE TO DRUG: ICD-10-CM

## 2019-11-04 DIAGNOSIS — K21.9 GERD WITHOUT ESOPHAGITIS: ICD-10-CM

## 2019-11-04 PROCEDURE — 96413 CHEMO IV INFUSION 1 HR: CPT

## 2019-11-04 PROCEDURE — 85025 COMPLETE CBC W/AUTO DIFF WBC: CPT

## 2019-11-04 PROCEDURE — 96375 TX/PRO/DX INJ NEW DRUG ADDON: CPT

## 2019-11-04 PROCEDURE — 99215 OFFICE O/P EST HI 40 MIN: CPT | Performed by: SPECIALIST

## 2019-11-04 PROCEDURE — 96417 CHEMO IV INFUS EACH ADDL SEQ: CPT

## 2019-11-04 PROCEDURE — 80053 COMPREHEN METABOLIC PANEL: CPT

## 2019-11-04 RX ORDER — SODIUM CHLORIDE 0.9 % (FLUSH) 0.9 %
10 SYRINGE (ML) INJECTION ONCE
Status: CANCELLED | OUTPATIENT
Start: 2019-11-04

## 2019-11-04 RX ORDER — SODIUM CHLORIDE 0.9 % (FLUSH) 0.9 %
10 SYRINGE (ML) INJECTION ONCE
Status: COMPLETED | OUTPATIENT
Start: 2019-11-04 | End: 2019-11-04

## 2019-11-04 RX ADMIN — SODIUM CHLORIDE 0.9 % (FLUSH) 10 ML: 0.9 % SYRINGE (ML) INJECTION at 14:45:00

## 2019-11-04 NOTE — PROGRESS NOTES
Patient is here today for follow up with Sylvester Rodriguez for Breast Cancer. C2D1 TCHP. Patient denies pain. Stated mild fatigue post treatment. Denies nausea with antiemetics.  Stated had diarrhea - took imodium intermittently - instructed to take two tablets wi

## 2019-11-04 NOTE — PROGRESS NOTES
Pt here for C2.   Arrives Ambulating independently, accompanied by Spouse           Patient denies possible pregnancy since last therapy cycle: No    Modifications in dose or schedule: No     Frequency of blood return and site check throughout administratio

## 2019-11-05 ENCOUNTER — OFFICE VISIT (OUTPATIENT)
Dept: HEMATOLOGY/ONCOLOGY | Facility: HOSPITAL | Age: 49
End: 2019-11-05
Attending: SPECIALIST
Payer: COMMERCIAL

## 2019-11-05 DIAGNOSIS — C50.412 MALIGNANT NEOPLASM OF UPPER-OUTER QUADRANT OF LEFT BREAST IN FEMALE, ESTROGEN RECEPTOR POSITIVE (HCC): Primary | ICD-10-CM

## 2019-11-05 DIAGNOSIS — Z17.0 MALIGNANT NEOPLASM OF UPPER-OUTER QUADRANT OF LEFT BREAST IN FEMALE, ESTROGEN RECEPTOR POSITIVE (HCC): Primary | ICD-10-CM

## 2019-11-05 PROCEDURE — 96372 THER/PROPH/DIAG INJ SC/IM: CPT

## 2019-11-05 NOTE — PROGRESS NOTES
Education Record    Learner:  Patient    Disease / Diagnosis: Breast CA - udenyca injection    Barriers / Limitations:  None   Comments:    Method:  Brief focused and Reinforcement   Comments:    General Topics:  Plan of care reviewed   Comments:    Susiom

## 2019-11-11 ENCOUNTER — HOSPITAL ENCOUNTER (EMERGENCY)
Facility: HOSPITAL | Age: 49
Discharge: HOME | End: 2019-11-11
Attending: EMERGENCY MEDICINE
Payer: COMMERCIAL

## 2019-11-11 VITALS
BODY MASS INDEX: 27 KG/M2 | RESPIRATION RATE: 19 BRPM | WEIGHT: 168 LBS | OXYGEN SATURATION: 98 % | SYSTOLIC BLOOD PRESSURE: 110 MMHG | DIASTOLIC BLOOD PRESSURE: 63 MMHG | TEMPERATURE: 98 F | HEIGHT: 66.14 IN | HEART RATE: 70 BPM

## 2019-11-11 DIAGNOSIS — N93.9 EPISODE OF HEAVY VAGINAL BLEEDING: Primary | ICD-10-CM

## 2019-11-11 PROCEDURE — 85025 COMPLETE CBC W/AUTO DIFF WBC: CPT | Performed by: EMERGENCY MEDICINE

## 2019-11-11 PROCEDURE — 99284 EMERGENCY DEPT VISIT MOD MDM: CPT

## 2019-11-11 PROCEDURE — 86901 BLOOD TYPING SEROLOGIC RH(D): CPT | Performed by: EMERGENCY MEDICINE

## 2019-11-11 PROCEDURE — 99283 EMERGENCY DEPT VISIT LOW MDM: CPT

## 2019-11-11 PROCEDURE — 85007 BL SMEAR W/DIFF WBC COUNT: CPT | Performed by: EMERGENCY MEDICINE

## 2019-11-11 PROCEDURE — 80053 COMPREHEN METABOLIC PANEL: CPT | Performed by: EMERGENCY MEDICINE

## 2019-11-11 PROCEDURE — 86900 BLOOD TYPING SEROLOGIC ABO: CPT | Performed by: EMERGENCY MEDICINE

## 2019-11-11 PROCEDURE — 86850 RBC ANTIBODY SCREEN: CPT | Performed by: EMERGENCY MEDICINE

## 2019-11-11 PROCEDURE — 84702 CHORIONIC GONADOTROPIN TEST: CPT | Performed by: EMERGENCY MEDICINE

## 2019-11-11 PROCEDURE — 85027 COMPLETE CBC AUTOMATED: CPT | Performed by: EMERGENCY MEDICINE

## 2019-11-11 PROCEDURE — 36415 COLL VENOUS BLD VENIPUNCTURE: CPT

## 2019-11-11 NOTE — PROGRESS NOTES
Nutrition Consultation (as per oncologist as pt w/ diarrhea)    Patient Name: Jennifer Raw  YOB: 1970  Medical Record Number: [id number]   Account Number: [de-identified]  Dietitian: Luci Pinto RD, LDN    Date of visit: 11/04/2019    TIKI soup/sandwich/leftovers for lunch and balanced dinner. Pt noted decreased coffee intake and increased intake of Gatorade. RD reviewed recommendations as noted encouraging increased intake of soluble fiber containing foods.  RD also suggested pt consider add

## 2019-11-12 ENCOUNTER — TELEPHONE (OUTPATIENT)
Dept: HEMATOLOGY/ONCOLOGY | Facility: HOSPITAL | Age: 49
End: 2019-11-12

## 2019-11-12 NOTE — TELEPHONE ENCOUNTER
Harvey Duane, MD  P Edw Oneal Puckett Rns             Please call patient. Seen in ED yesterday for heavy vaginal bleeding. See how she is doing. Tell her I looked at her blood tests and everything looked as it should based upon her treatment.  Not a

## 2019-11-12 NOTE — TELEPHONE ENCOUNTER
Mariaa Terrazas called she was in ER last night due to vaginal bleeding, counts not low enough for a transfusion  10.7. Filled a large pad overnight, decreased now. No other complaints per patient.   Was updating Dr Fredis Dobbs wondering if she needs to follow up w

## 2019-11-12 NOTE — ED INITIAL ASSESSMENT (HPI)
Chemo last Monday, throughout the week patient was having period bleeding and then today at 1700 clots started coming out. Per patient, approximately every time she goes to the bathroom she changes her pad.

## 2019-11-12 NOTE — ED PROVIDER NOTES
Patient Seen in: [facility] Emergency Department      History   Patient presents with:  Eval-G (genital)    Stated Complaint: c/o vaginal bleeding since 1700, states about 2pads an hr. Pt on chemo for Frandy Brody*    HPI    64-year-old who is currently on Sclera are not icteric. Conjunctivae within normal limits. Mucous members are moist.   Cardiovascular: Regular rate and rhythm, normal S1-S2. Respiratory: Lungs are clear to auscultation bilaterally. Abdomen: Soft, nontender, nondistended.   : Katelyn Monsalve (BLOOD UIPG)[[id number]]                               Final result               ANTIBODY [id number][[id number]]                                  Final result                 Please view results for these tests on the individual orders.    ABORH (BLOOD TYPE)   A

## 2019-11-23 NOTE — PROGRESS NOTES
[facility] Progress Note      Patient Name: Eduar Dubon   YOB: 1970  Medical Record Number: [id number]  Attending Physician: Israel Dial. Jim See M.D.      Date of Visit: 11/25/2019      Chief Complaint  Invasive ductal carcinoma, significant diarrhea after last cycle but she admits that she did not use the Imodium as recommended. She denies any current GERD. No nausea or vomiting. No peripheral neuropathy. Reports good appetite and energy level.      Performance Status   Karnofsky 1 (5' 6.54\") (11/25 3680)  Weight: 77.1 kg (170 lb) (11/25 0839)  BSA (Calculated - sq m): 1.88 sq meters (11/25 0839)  Pulse: 79 (11/25 0839)  BP: 114/70 (11/25 0839)  Temp: 98.4 °F (36.9 °C) (11/25 0839)  Do Not Use - Resp Rate: --  SpO2: 97 % (11/25 0839 FASTING No    CBC W/ DIFFERENTIAL    Collection Time: 11/25/19  8:35 AM   Result Value Ref Range    WBC 3.9 (L) 4.0 - 11.0 x10(3) uL    RBC 3.40 (L) 3.80 - 5.30 x10(6)uL    HGB 9.5 (L) 12.0 - 16.0 g/dL    HCT 30.5 (L) 35.0 - 48.0 %    .0 150.0 - 450

## 2019-11-25 ENCOUNTER — OFFICE VISIT (OUTPATIENT)
Dept: HEMATOLOGY/ONCOLOGY | Facility: HOSPITAL | Age: 49
End: 2019-11-25
Attending: SPECIALIST
Payer: COMMERCIAL

## 2019-11-25 VITALS
TEMPERATURE: 98 F | DIASTOLIC BLOOD PRESSURE: 70 MMHG | SYSTOLIC BLOOD PRESSURE: 114 MMHG | BODY MASS INDEX: 27 KG/M2 | HEART RATE: 79 BPM | OXYGEN SATURATION: 97 % | HEIGHT: 66.54 IN | WEIGHT: 170 LBS | RESPIRATION RATE: 18 BRPM

## 2019-11-25 DIAGNOSIS — E03.9 ACQUIRED HYPOTHYROIDISM: ICD-10-CM

## 2019-11-25 DIAGNOSIS — Z51.81 ENCOUNTER FOR MONITORING CARDIOTOXIC DRUG THERAPY: ICD-10-CM

## 2019-11-25 DIAGNOSIS — K52.1 DIARRHEA DUE TO DRUG: ICD-10-CM

## 2019-11-25 DIAGNOSIS — Z17.0 MALIGNANT NEOPLASM OF UPPER-OUTER QUADRANT OF LEFT BREAST IN FEMALE, ESTROGEN RECEPTOR POSITIVE (HCC): Primary | ICD-10-CM

## 2019-11-25 DIAGNOSIS — C50.412 MALIGNANT NEOPLASM OF UPPER-OUTER QUADRANT OF LEFT BREAST IN FEMALE, ESTROGEN RECEPTOR POSITIVE (HCC): Primary | ICD-10-CM

## 2019-11-25 DIAGNOSIS — F32.89 OTHER DEPRESSION: ICD-10-CM

## 2019-11-25 DIAGNOSIS — Z79.899 ENCOUNTER FOR MONITORING CARDIOTOXIC DRUG THERAPY: ICD-10-CM

## 2019-11-25 PROCEDURE — 96413 CHEMO IV INFUSION 1 HR: CPT

## 2019-11-25 PROCEDURE — 96375 TX/PRO/DX INJ NEW DRUG ADDON: CPT

## 2019-11-25 PROCEDURE — 96417 CHEMO IV INFUS EACH ADDL SEQ: CPT

## 2019-11-25 PROCEDURE — 85025 COMPLETE CBC W/AUTO DIFF WBC: CPT

## 2019-11-25 PROCEDURE — 99215 OFFICE O/P EST HI 40 MIN: CPT | Performed by: SPECIALIST

## 2019-11-25 PROCEDURE — 80053 COMPREHEN METABOLIC PANEL: CPT

## 2019-11-25 RX ORDER — LOPERAMIDE HYDROCHLORIDE 2 MG/1
2 CAPSULE ORAL 4 TIMES DAILY PRN
COMMUNITY
End: 2021-02-04

## 2019-11-25 RX ORDER — SODIUM CHLORIDE 0.9 % (FLUSH) 0.9 %
10 SYRINGE (ML) INJECTION ONCE
Status: CANCELLED | OUTPATIENT
Start: 2019-11-25

## 2019-11-25 RX ORDER — SODIUM CHLORIDE 0.9 % (FLUSH) 0.9 %
10 SYRINGE (ML) INJECTION ONCE
Status: COMPLETED | OUTPATIENT
Start: 2019-11-25 | End: 2019-11-25

## 2019-11-25 RX ADMIN — SODIUM CHLORIDE 0.9 % (FLUSH) 10 ML: 0.9 % SYRINGE (ML) INJECTION at 13:30:00

## 2019-11-25 NOTE — PROGRESS NOTES
Nutrition Consultation (as per oncologist as pt w/ diarrhea)     Patient Name: Paul Payne  YOB: 1970  Medical Record Number: [id number]      Account Number: [de-identified]  Dietitian: Luis Paul RD, LEAN     Date of visit: 11/04/2019    soluble fiber supplement daily (Metamucl) or pectin or Banatrol. Use discussed. Pt verrbalized understanding of recommendations made. RD will continue to follow.

## 2019-11-25 NOTE — PROGRESS NOTES
Patient is here today for follow up with Ariel Schwab for Breast Cancer - C3D1 TCHP. Patient stated port area near axilla is painful. Stated diarrhea - occasionally takes imodium. Appetite is good. Denies nausea with antiemetics.  Denies neuropathy Medicatio

## 2019-11-26 ENCOUNTER — OFFICE VISIT (OUTPATIENT)
Dept: HEMATOLOGY/ONCOLOGY | Facility: HOSPITAL | Age: 49
End: 2019-11-26
Attending: SPECIALIST
Payer: COMMERCIAL

## 2019-11-26 DIAGNOSIS — Z17.0 MALIGNANT NEOPLASM OF UPPER-OUTER QUADRANT OF LEFT BREAST IN FEMALE, ESTROGEN RECEPTOR POSITIVE (HCC): Primary | ICD-10-CM

## 2019-11-26 DIAGNOSIS — C50.412 MALIGNANT NEOPLASM OF UPPER-OUTER QUADRANT OF LEFT BREAST IN FEMALE, ESTROGEN RECEPTOR POSITIVE (HCC): Primary | ICD-10-CM

## 2019-11-26 PROCEDURE — 96372 THER/PROPH/DIAG INJ SC/IM: CPT

## 2019-11-26 NOTE — PROGRESS NOTES
Education Record    Learner:  Patient    Disease / Diagnosis: udenyca injection     Barriers / Limitations:  None   Comments:    Method:  Brief focused   Comments:    General Topics:  Plan of care reviewed   Comments:    Outcome:  Shows understanding   Com

## 2019-12-04 ENCOUNTER — OFFICE VISIT (OUTPATIENT)
Dept: SURGERY | Facility: CLINIC | Age: 49
End: 2019-12-04
Payer: COMMERCIAL

## 2019-12-04 VITALS — HEART RATE: 73 BPM | SYSTOLIC BLOOD PRESSURE: 103 MMHG | DIASTOLIC BLOOD PRESSURE: 67 MMHG | TEMPERATURE: 98 F

## 2019-12-04 DIAGNOSIS — Z17.0 MALIGNANT NEOPLASM OF UPPER-OUTER QUADRANT OF LEFT BREAST IN FEMALE, ESTROGEN RECEPTOR POSITIVE (HCC): ICD-10-CM

## 2019-12-04 DIAGNOSIS — K64.5 THROMBOSED HEMORRHOIDS: ICD-10-CM

## 2019-12-04 DIAGNOSIS — C50.412 MALIGNANT NEOPLASM OF UPPER-OUTER QUADRANT OF LEFT BREAST IN FEMALE, ESTROGEN RECEPTOR POSITIVE (HCC): ICD-10-CM

## 2019-12-04 DIAGNOSIS — K64.5 THROMBOSED HEMORRHOIDS: Primary | ICD-10-CM

## 2019-12-04 PROCEDURE — 99213 OFFICE O/P EST LOW 20 MIN: CPT | Performed by: SURGERY

## 2019-12-04 NOTE — PROGRESS NOTES
Follow Up Visit Note       Active Problems      1. Thrombosed hemorrhoids    2.  Malignant neoplasm of upper-outer quadrant of left breast in female, estrogen receptor positive [facility])          Chief Complaint   Patient presents with:  Hemorrhoids: est pt hem of Onset   • Heart Disorder Father         CHF   • Diabetes Mother    • Hypertension Mother    • Other (Hypothyroidism) Mother    • Psychiatric Daughter 16        Depression/anxiety   • Other (Hypothyroidism) Daughter    • Other (Hypothyroidism) Daughter unexpected weight change. HENT: Negative for hearing loss, nosebleeds, sore throat and trouble swallowing. Respiratory: Negative for apnea, cough, shortness of breath and wheezing.     Cardiovascular: Negative for chest pain, palpitations and leg swell quadrant of left breast in female, estrogen receptor positive ([facility] [address]    Plan   · If the blood clot was readily visible, she could have a hemorrhoid thrombectomy. Instead, her hemorrhoids are edematous.   We then discussed medical management versus surgical

## 2019-12-09 ENCOUNTER — SOCIAL WORK SERVICES (OUTPATIENT)
Dept: HEMATOLOGY/ONCOLOGY | Facility: HOSPITAL | Age: 49
End: 2019-12-09

## 2019-12-09 NOTE — PROGRESS NOTES
CRISTAL received message from patient that Es Johnson was requesting a new medical clearance form. CRISTAL completed and faxed to Edwin Bennett.

## 2019-12-15 NOTE — PROGRESS NOTES
[facility] Progress Note      Patient Name: Lori Verdugo   YOB: 1970  Medical Record Number: [id number]  Attending Physician: Elzbieta Kelley. Benoit Weinstein M.D.      Date of Visit: 12/16/2019      Chief Complaint  Invasive ductal carcinoma, Illness  Patient presents for scheduled follow up. She states that her diarrhea is significantly less since she started using Imodium regularly. She reports only two loose stools per day. She was seen by general surgery for painful hemorrhoid.  That has imp 3  Ferrous Sulfate (IRON) 325 (65 FE) MG Oral Tab, Take by mouth., Disp: , Rfl:     Allergies   Ms. Kimberly Overton is allergic to codeine [opioid analgesics]. Review of Systems   Constitutional      No fevers, chills, night sweats, excessive fatigue.   Breasts Creatinine 0.84 0.55 - 1.02 mg/dL    BUN/CREA Ratio 14.3 10.0 - 20.0    Calcium, Total 9.2 8.5 - 10.1 mg/dL    Calculated Osmolality 294 275 - 295 mOsm/kg    GFR, Non- 82 >=60    GFR, -American 95 >=60    AST 28 15 - 37 U/L    ALT Planned Follow Up   Patient will return for follow up and chemotherapy in 3 weeks. Risk Level: High - breast cancer on chemotherapy. Electronically signed by:    Brant Hammans Aisha Shad, M.D.   [facility]  Director, Bone and Soft T

## 2019-12-16 ENCOUNTER — OFFICE VISIT (OUTPATIENT)
Dept: HEMATOLOGY/ONCOLOGY | Facility: HOSPITAL | Age: 49
End: 2019-12-16
Attending: SPECIALIST
Payer: COMMERCIAL

## 2019-12-16 VITALS
OXYGEN SATURATION: 96 % | HEART RATE: 75 BPM | RESPIRATION RATE: 16 BRPM | WEIGHT: 168.81 LBS | HEIGHT: 66.54 IN | DIASTOLIC BLOOD PRESSURE: 72 MMHG | SYSTOLIC BLOOD PRESSURE: 113 MMHG | TEMPERATURE: 98 F | BODY MASS INDEX: 26.81 KG/M2

## 2019-12-16 DIAGNOSIS — Z79.899 ENCOUNTER FOR MONITORING CARDIOTOXIC DRUG THERAPY: ICD-10-CM

## 2019-12-16 DIAGNOSIS — E03.9 ACQUIRED HYPOTHYROIDISM: ICD-10-CM

## 2019-12-16 DIAGNOSIS — F32.89 OTHER DEPRESSION: ICD-10-CM

## 2019-12-16 DIAGNOSIS — K52.1 DIARRHEA DUE TO DRUG: ICD-10-CM

## 2019-12-16 DIAGNOSIS — C50.412 MALIGNANT NEOPLASM OF UPPER-OUTER QUADRANT OF LEFT BREAST IN FEMALE, ESTROGEN RECEPTOR POSITIVE (HCC): Primary | ICD-10-CM

## 2019-12-16 DIAGNOSIS — Z17.0 MALIGNANT NEOPLASM OF UPPER-OUTER QUADRANT OF LEFT BREAST IN FEMALE, ESTROGEN RECEPTOR POSITIVE (HCC): Primary | ICD-10-CM

## 2019-12-16 DIAGNOSIS — Z51.81 ENCOUNTER FOR MONITORING CARDIOTOXIC DRUG THERAPY: ICD-10-CM

## 2019-12-16 PROCEDURE — 80053 COMPREHEN METABOLIC PANEL: CPT

## 2019-12-16 PROCEDURE — 96417 CHEMO IV INFUS EACH ADDL SEQ: CPT

## 2019-12-16 PROCEDURE — 96413 CHEMO IV INFUSION 1 HR: CPT

## 2019-12-16 PROCEDURE — 99215 OFFICE O/P EST HI 40 MIN: CPT | Performed by: SPECIALIST

## 2019-12-16 PROCEDURE — 85025 COMPLETE CBC W/AUTO DIFF WBC: CPT

## 2019-12-16 NOTE — PROGRESS NOTES
Pt here for C4D1 TCHP.   Arrives Ambulating independently, accompanied by Family member     Patient reports possible pregnancy since last therapy cycle: No    Modifications in dose or schedule: No     Frequency of blood return and site check throughout admi

## 2019-12-17 ENCOUNTER — OFFICE VISIT (OUTPATIENT)
Dept: HEMATOLOGY/ONCOLOGY | Facility: HOSPITAL | Age: 49
End: 2019-12-17
Attending: SPECIALIST
Payer: COMMERCIAL

## 2019-12-17 DIAGNOSIS — C50.412 MALIGNANT NEOPLASM OF UPPER-OUTER QUADRANT OF LEFT BREAST IN FEMALE, ESTROGEN RECEPTOR POSITIVE (HCC): Primary | ICD-10-CM

## 2019-12-17 DIAGNOSIS — Z17.0 MALIGNANT NEOPLASM OF UPPER-OUTER QUADRANT OF LEFT BREAST IN FEMALE, ESTROGEN RECEPTOR POSITIVE (HCC): Primary | ICD-10-CM

## 2019-12-17 PROCEDURE — 96372 THER/PROPH/DIAG INJ SC/IM: CPT

## 2019-12-23 ENCOUNTER — OFFICE VISIT (OUTPATIENT)
Dept: HEMATOLOGY/ONCOLOGY | Facility: HOSPITAL | Age: 49
End: 2019-12-23
Attending: SPECIALIST
Payer: COMMERCIAL

## 2019-12-23 ENCOUNTER — HOSPITAL ENCOUNTER (OUTPATIENT)
Dept: CV DIAGNOSTICS | Facility: HOSPITAL | Age: 49
Discharge: HOME | End: 2019-12-23
Attending: SPECIALIST
Payer: COMMERCIAL

## 2019-12-23 ENCOUNTER — TELEPHONE (OUTPATIENT)
Dept: HEMATOLOGY/ONCOLOGY | Facility: HOSPITAL | Age: 49
End: 2019-12-23

## 2019-12-23 VITALS
OXYGEN SATURATION: 98 % | WEIGHT: 166.38 LBS | TEMPERATURE: 98 F | BODY MASS INDEX: 26.42 KG/M2 | HEIGHT: 66.54 IN | RESPIRATION RATE: 16 BRPM | SYSTOLIC BLOOD PRESSURE: 130 MMHG | HEART RATE: 83 BPM | DIASTOLIC BLOOD PRESSURE: 85 MMHG

## 2019-12-23 DIAGNOSIS — Z51.81 ENCOUNTER FOR MONITORING CARDIOTOXIC DRUG THERAPY: ICD-10-CM

## 2019-12-23 DIAGNOSIS — E86.0 DEHYDRATION: ICD-10-CM

## 2019-12-23 DIAGNOSIS — Z17.0 MALIGNANT NEOPLASM OF UPPER-OUTER QUADRANT OF LEFT BREAST IN FEMALE, ESTROGEN RECEPTOR POSITIVE (HCC): ICD-10-CM

## 2019-12-23 DIAGNOSIS — R19.7 DIARRHEA, UNSPECIFIED TYPE: Primary | ICD-10-CM

## 2019-12-23 DIAGNOSIS — C50.412 MALIGNANT NEOPLASM OF UPPER-OUTER QUADRANT OF LEFT BREAST IN FEMALE, ESTROGEN RECEPTOR POSITIVE (HCC): ICD-10-CM

## 2019-12-23 DIAGNOSIS — Z79.899 ENCOUNTER FOR MONITORING CARDIOTOXIC DRUG THERAPY: ICD-10-CM

## 2019-12-23 DIAGNOSIS — K64.9 HEMORRHOIDS, UNSPECIFIED HEMORRHOID TYPE: ICD-10-CM

## 2019-12-23 DIAGNOSIS — B36.9 CUTANEOUS FUNGAL INFECTION: ICD-10-CM

## 2019-12-23 PROCEDURE — 96360 HYDRATION IV INFUSION INIT: CPT

## 2019-12-23 PROCEDURE — 83735 ASSAY OF MAGNESIUM: CPT

## 2019-12-23 PROCEDURE — 93306 TTE W/DOPPLER COMPLETE: CPT | Performed by: SPECIALIST

## 2019-12-23 PROCEDURE — 85025 COMPLETE CBC W/AUTO DIFF WBC: CPT

## 2019-12-23 PROCEDURE — 93307 TTE W/O DOPPLER COMPLETE: CPT | Performed by: SPECIALIST

## 2019-12-23 PROCEDURE — 99215 OFFICE O/P EST HI 40 MIN: CPT | Performed by: CLINICAL NURSE SPECIALIST

## 2019-12-23 PROCEDURE — 96361 HYDRATE IV INFUSION ADD-ON: CPT

## 2019-12-23 PROCEDURE — 80048 BASIC METABOLIC PNL TOTAL CA: CPT

## 2019-12-23 PROCEDURE — 0399T CARD TTE STRAIN W/O DOPPLER ONCOLOGY(CPT=93307/0399T): CPT | Performed by: SPECIALIST

## 2019-12-23 RX ORDER — SODIUM CHLORIDE 0.9 % (FLUSH) 0.9 %
10 SYRINGE (ML) INJECTION ONCE
Status: COMPLETED | OUTPATIENT
Start: 2019-12-23 | End: 2019-12-23

## 2019-12-23 RX ORDER — SODIUM CHLORIDE 9 MG/ML
INJECTION, SOLUTION INTRAVENOUS ONCE
Status: COMPLETED | OUTPATIENT
Start: 2019-12-23 | End: 2019-12-23

## 2019-12-23 RX ORDER — SODIUM CHLORIDE 9 MG/ML
INJECTION, SOLUTION INTRAVENOUS ONCE
Status: CANCELLED
Start: 2019-12-23

## 2019-12-23 RX ORDER — SODIUM CHLORIDE 0.9 % (FLUSH) 0.9 %
10 SYRINGE (ML) INJECTION ONCE
Status: CANCELLED | OUTPATIENT
Start: 2019-12-23

## 2019-12-23 RX ORDER — FLUCONAZOLE 100 MG/1
TABLET ORAL
Qty: 8 TABLET | Refills: 0 | Status: SHIPPED | OUTPATIENT
Start: 2019-12-23 | End: 2020-01-06 | Stop reason: ALTCHOICE

## 2019-12-23 RX ADMIN — SODIUM CHLORIDE 0.9 % (FLUSH) 10 ML: 0.9 % SYRINGE (ML) INJECTION at 14:05:00

## 2019-12-23 RX ADMIN — SODIUM CHLORIDE: 9 INJECTION, SOLUTION INTRAVENOUS at 12:34:00

## 2019-12-23 NOTE — PROGRESS NOTES
Patient presents with:  Diarrhea: APN assessment prior to treatment  Fatigue    Pt is here for a sick call - pt has had increased fatigue, diarrhea and rectum discomfort. She was last treated on 12/16/19 with C4 TCHP.  Pt has had diarrhea during previous cy

## 2019-12-23 NOTE — TELEPHONE ENCOUNTER
Teola Aase called to say that she has an extreme diaper rash. She was looking to speak with a nurse to see what she can do to get rid of it. She asked that she please get a call back.

## 2019-12-23 NOTE — PROGRESS NOTES
Pt. Was having some diarrhea and a fungal infection,  so she came in for labs, IVF and stool sample. She was unable to provide a sample, so kit was sent home with patient and she will try to drop tomorrow.

## 2019-12-23 NOTE — PROGRESS NOTES
[facility] Progress Note    Patient Name: Latia Manning   YOB: 1970   Medical Record Number: [id number]   CSN: [id number]   Date of visit: 12/23/2019   Provider: CHERYL Borrego  Referring Physician: Stephanie Verdugo , Rfl:   •  LEVOTHYROXINE SODIUM 150 MCG Oral Tab, TAKE 2 TABLETS DAILY (90 DAY MILADYS PERIOD ONLY), Disp: 60 tablet, Rfl: 0  •  Prochlorperazine Maleate (COMPAZINE) 10 mg tablet, Take 1 tablet (10 mg total) by mouth every 6 (six) hours as needed for Nausea DIFFERENTIAL    Collection Time: 12/23/19 12:21 PM   Result Value Ref Range    WBC 4.4 4.0 - 11.0 x10(3) uL    RBC 3.59 (L) 3.80 - 5.30 x10(6)uL    HGB 10.0 (L) 12.0 - 16.0 g/dL    HCT 32.3 (L) 35.0 - 48.0 %    .0 (L) 150.0 - 450.0 10(3)uL    MCV [address] [facility]

## 2019-12-23 NOTE — TELEPHONE ENCOUNTER
Toxicities: D4 D1 TCHP 12/16, Udenyca 12/17. Dx left breast CA, sees Dr Reinaldo Manley    Fatigue/rash/dehydration/diarrhea        Fatigue: Grade 1 (mild, relieved by rest)     Rash Maculo-Papular: Grade 1 ( GUILLAUME AREA: bright red, itchy - raised areas possibly blistered along 2 sides. Has tried 2 types of creams - Butt paste and Desitin)      Deydration: Grade 1 (denies feeling lightheaded/dizzy. Drinking approx 50 oz/day as able). Diarrhea: Grade 2  (Yesterday had 6-7 liq brown watery stools - took 2 imodium only. Today = no stools or imodium taken. Not eating much as it can start up her diarrhea. Eating BRAT type diet. Using wipes and hydrocortisone cream for known hemorrhoids; no bleeding.)    Set up acute care visit with Giselle at 1pm today. Alerted infusion dept of possible labs/ hydration as well.

## 2019-12-26 ENCOUNTER — APPOINTMENT (OUTPATIENT)
Dept: HEMATOLOGY/ONCOLOGY | Facility: HOSPITAL | Age: 49
End: 2019-12-26
Attending: SPECIALIST
Payer: COMMERCIAL

## 2019-12-26 DIAGNOSIS — R19.7 DIARRHEA, UNSPECIFIED TYPE: ICD-10-CM

## 2019-12-26 PROCEDURE — 87493 C DIFF AMPLIFIED PROBE: CPT

## 2019-12-30 NOTE — PROGRESS NOTES
[facility] Progress Note      Patient Name: Dominic Cramer   YOB: 1970  Medical Record Number: [id number]  Attending Physician: Emerita Marmolejo. Cecilia Carlos M.D.      Date of Visit: 1/6/2020      Chief Complaint  Invasive ductal carcinoma, le and was complicated by diarrhea and superficial fungal infection. Cycle 5 was started on 01/06/2020. History of Present Illness  Patient presents for scheduled follow up.  She states that her diarrhea is significantly less since she started using Imodium 3    Allergies   Ms. Kamaljit Bah is allergic to codeine [opioid analgesics]. Review of Systems   Constitutional      No fevers, chills, night sweats, excessive fatigue. Breasts   No self palpated masses; no nipple discharge.    Respiratory          No dyspn mmol/L    BUN 13 7 - 18 mg/dL    Creatinine 0.82 0.55 - 1.02 mg/dL    BUN/CREA Ratio 15.9 10.0 - 20.0    Calcium, Total 8.9 8.5 - 10.1 mg/dL    Calculated Osmolality 296 (H) 275 - 295 mOsm/kg    GFR, Non- 85 >=60    GFR, -American 98 10/01/2019: Compared to the prior study, there has been no significant interval change. Impression and Plan   1. Invasive ductal carcinoma, left breast: [id number]. Tumor is estrogen and progesterone receptor positive and Her2/samina positive.  Proceed with

## 2020-01-06 ENCOUNTER — NURSE NAVIGATOR ENCOUNTER (OUTPATIENT)
Dept: HEMATOLOGY/ONCOLOGY | Facility: HOSPITAL | Age: 50
End: 2020-01-06

## 2020-01-06 ENCOUNTER — OFFICE VISIT (OUTPATIENT)
Dept: HEMATOLOGY/ONCOLOGY | Facility: HOSPITAL | Age: 50
End: 2020-01-06
Attending: SPECIALIST
Payer: COMMERCIAL

## 2020-01-06 VITALS
SYSTOLIC BLOOD PRESSURE: 111 MMHG | WEIGHT: 170 LBS | BODY MASS INDEX: 27 KG/M2 | HEIGHT: 66.54 IN | RESPIRATION RATE: 18 BRPM | OXYGEN SATURATION: 98 % | DIASTOLIC BLOOD PRESSURE: 66 MMHG | HEART RATE: 76 BPM | TEMPERATURE: 99 F

## 2020-01-06 DIAGNOSIS — C50.412 MALIGNANT NEOPLASM OF UPPER-OUTER QUADRANT OF LEFT BREAST IN FEMALE, ESTROGEN RECEPTOR POSITIVE (HCC): Primary | ICD-10-CM

## 2020-01-06 DIAGNOSIS — F32.89 OTHER DEPRESSION: ICD-10-CM

## 2020-01-06 DIAGNOSIS — K52.1 DIARRHEA DUE TO DRUG: ICD-10-CM

## 2020-01-06 DIAGNOSIS — Z17.0 MALIGNANT NEOPLASM OF UPPER-OUTER QUADRANT OF LEFT BREAST IN FEMALE, ESTROGEN RECEPTOR POSITIVE (HCC): Primary | ICD-10-CM

## 2020-01-06 DIAGNOSIS — E03.9 ACQUIRED HYPOTHYROIDISM: ICD-10-CM

## 2020-01-06 LAB
ALBUMIN SERPL-MCNC: 3.9 G/DL (ref 3.4–5)
ALBUMIN/GLOB SERPL: 1.1 {RATIO} (ref 1–2)
ALP LIVER SERPL-CCNC: 62 U/L (ref 39–100)
ALT SERPL-CCNC: 37 U/L (ref 13–56)
ANION GAP SERPL CALC-SCNC: 4 MMOL/L (ref 0–18)
AST SERPL-CCNC: 28 U/L (ref 15–37)
BASOPHILS # BLD AUTO: 0.07 X10(3) UL (ref 0–0.2)
BASOPHILS NFR BLD AUTO: 1.7 %
BILIRUB SERPL-MCNC: 0.3 MG/DL (ref 0.1–2)
BUN BLD-MCNC: 13 MG/DL (ref 7–18)
BUN/CREAT SERPL: 15.9 (ref 10–20)
CALCIUM BLD-MCNC: 8.9 MG/DL (ref 8.5–10.1)
CHLORIDE SERPL-SCNC: 111 MMOL/L (ref 98–112)
CO2 SERPL-SCNC: 28 MMOL/L (ref 21–32)
CREAT BLD-MCNC: 0.82 MG/DL (ref 0.55–1.02)
DEPRECATED RDW RBC AUTO: 53.5 FL (ref 35.1–46.3)
EOSINOPHIL # BLD AUTO: 0.02 X10(3) UL (ref 0–0.7)
EOSINOPHIL NFR BLD AUTO: 0.5 %
ERYTHROCYTE [DISTWIDTH] IN BLOOD BY AUTOMATED COUNT: 16.2 % (ref 11–15)
GLOBULIN PLAS-MCNC: 3.4 G/DL (ref 2.8–4.4)
GLUCOSE BLD-MCNC: 89 MG/DL (ref 70–99)
HAV IGM SER QL: 2.1 MG/DL (ref 1.6–2.6)
HCT VFR BLD AUTO: 34.9 % (ref 35–48)
HGB BLD-MCNC: 10.8 G/DL (ref 12–16)
IMM GRANULOCYTES # BLD AUTO: 0.01 X10(3) UL (ref 0–1)
IMM GRANULOCYTES NFR BLD: 0.2 %
LYMPHOCYTES # BLD AUTO: 0.86 X10(3) UL (ref 1–4)
LYMPHOCYTES NFR BLD AUTO: 21.3 %
M PROTEIN MFR SERPL ELPH: 7.3 G/DL (ref 6.4–8.2)
MCH RBC QN AUTO: 28.1 PG (ref 26–34)
MCHC RBC AUTO-ENTMCNC: 30.9 G/DL (ref 31–37)
MCV RBC AUTO: 90.6 FL (ref 80–100)
MONOCYTES # BLD AUTO: 0.38 X10(3) UL (ref 0.1–1)
MONOCYTES NFR BLD AUTO: 9.4 %
NEUTROPHILS # BLD AUTO: 2.69 X10 (3) UL (ref 1.5–7.7)
NEUTROPHILS # BLD AUTO: 2.69 X10(3) UL (ref 1.5–7.7)
NEUTROPHILS NFR BLD AUTO: 66.9 %
OSMOLALITY SERPL CALC.SUM OF ELEC: 296 MOSM/KG (ref 275–295)
PATIENT FASTING Y/N/NP: NO
PLATELET # BLD AUTO: 186 10(3)UL (ref 150–450)
POTASSIUM SERPL-SCNC: 3.9 MMOL/L (ref 3.5–5.1)
RBC # BLD AUTO: 3.85 X10(6)UL (ref 3.8–5.3)
SODIUM SERPL-SCNC: 143 MMOL/L (ref 136–145)
WBC # BLD AUTO: 4 X10(3) UL (ref 4–11)

## 2020-01-06 PROCEDURE — 83735 ASSAY OF MAGNESIUM: CPT

## 2020-01-06 PROCEDURE — 99215 OFFICE O/P EST HI 40 MIN: CPT | Performed by: SPECIALIST

## 2020-01-06 PROCEDURE — 96375 TX/PRO/DX INJ NEW DRUG ADDON: CPT

## 2020-01-06 PROCEDURE — 96417 CHEMO IV INFUS EACH ADDL SEQ: CPT

## 2020-01-06 PROCEDURE — 80053 COMPREHEN METABOLIC PANEL: CPT

## 2020-01-06 PROCEDURE — 96413 CHEMO IV INFUSION 1 HR: CPT

## 2020-01-06 PROCEDURE — 85025 COMPLETE CBC W/AUTO DIFF WBC: CPT

## 2020-01-06 NOTE — PROGRESS NOTES
Met with patient during chemotherapy infusion. Pt will finish chemotherapy on 1/27. Pt will then need MRI for surgical planning and will follow up with Dr. Patric Reaves to discuss.  Pt has decided upon B mastectomy without reconstruction, she has previously met w

## 2020-01-06 NOTE — PROGRESS NOTES
Pt here for C5D1.   Arrives Ambulating independently, accompanied by Family member           Patient reports possible pregnancy since last therapy cycle: Yes    Modifications in dose or schedule: No     Frequency of blood return and site check throughout ad

## 2020-01-07 ENCOUNTER — OFFICE VISIT (OUTPATIENT)
Dept: HEMATOLOGY/ONCOLOGY | Facility: HOSPITAL | Age: 50
End: 2020-01-07
Attending: SPECIALIST
Payer: COMMERCIAL

## 2020-01-07 DIAGNOSIS — C50.412 MALIGNANT NEOPLASM OF UPPER-OUTER QUADRANT OF LEFT BREAST IN FEMALE, ESTROGEN RECEPTOR POSITIVE (HCC): Primary | ICD-10-CM

## 2020-01-07 DIAGNOSIS — Z17.0 MALIGNANT NEOPLASM OF UPPER-OUTER QUADRANT OF LEFT BREAST IN FEMALE, ESTROGEN RECEPTOR POSITIVE (HCC): Primary | ICD-10-CM

## 2020-01-07 PROCEDURE — 96372 THER/PROPH/DIAG INJ SC/IM: CPT

## 2020-01-07 NOTE — PROGRESS NOTES
Education Record    Learner:  Patient    Disease / Diagnosis: udenyca injection    Barriers / Limitations:  None   Comments:    Method:  Brief focused   Comments:    General Topics:  Plan of care reviewed   Comments:    Outcome:  Shows understanding   Comm

## 2020-01-08 PROCEDURE — 88305 TISSUE EXAM BY PATHOLOGIST: CPT | Performed by: OBSTETRICS & GYNECOLOGY

## 2020-01-14 ENCOUNTER — TELEPHONE (OUTPATIENT)
Dept: SURGERY | Facility: CLINIC | Age: 50
End: 2020-01-14

## 2020-01-14 NOTE — TELEPHONE ENCOUNTER
Preauthorization via [employer] online for CPT 07731 per health plan no preauthorization is required.   From [employer] website:  Health Plan: [id number] JOSE Liu.  Patient ID: [id number]  Member Code:   Eligibility: No Prior Authorization is required for: Radiology, Diagnostic

## 2020-01-26 NOTE — PROGRESS NOTES
[facility] Progress Note      Patient Name: Radha Silver   YOB: 1970  Medical Record Number: [id number]  Attending Physician: Marsha Calderon. Kristin Hurt M.D.      Date of Visit: 1/27/2020      Chief Complaint  Invasive ductal carcinoma, l and was complicated by diarrhea and superficial fungal infection. Cycle 5 was started on 04/78/1897 and was complicated by diarrhea and superficial fungal infection. Cycle 6 was started on 01/27/2020.     History of Present Illness  Patient presents for cassius 2 MG Oral Cap, Take 2 mg by mouth 4 (four) times daily as needed for Diarrhea., Disp: , Rfl:   LEVOTHYROXINE SODIUM 150 MCG Oral Tab, TAKE 2 TABLETS DAILY (90 DAY MILADYS PERIOD ONLY), Disp: 60 tablet, Rfl: 0  Prochlorperazine Maleate (COMPAZINE) 10 mg table tobias-anally and external hemorrhoids. Integumentary  Skin is warm and dry; no rashes. Neurologic  Alert and oriented x 3; motor and sensory grossly intact.   Psychiatric  Mood and affect appropriate; coherent speech; verbalizes understanding of our discu x10(3) uL    Immature Granulocyte Absolute 0.02 0.00 - 1.00 x10(3) uL    Neutrophil % 66.2 %    Lymphocyte % 24.6 %    Monocyte % 7.4 %    Eosinophil % 0.3 %    Basophil % 0.9 %    Immature Granulocyte % 0.6 %      Impression and Plan   1.    Invasive ducta

## 2020-01-27 ENCOUNTER — OFFICE VISIT (OUTPATIENT)
Dept: HEMATOLOGY/ONCOLOGY | Facility: HOSPITAL | Age: 50
End: 2020-01-27
Attending: SPECIALIST
Payer: COMMERCIAL

## 2020-01-27 VITALS
SYSTOLIC BLOOD PRESSURE: 117 MMHG | OXYGEN SATURATION: 98 % | HEIGHT: 66.54 IN | DIASTOLIC BLOOD PRESSURE: 76 MMHG | TEMPERATURE: 98 F | HEART RATE: 79 BPM | WEIGHT: 171 LBS | RESPIRATION RATE: 18 BRPM | BODY MASS INDEX: 27.16 KG/M2

## 2020-01-27 DIAGNOSIS — K52.1 DIARRHEA DUE TO DRUG: ICD-10-CM

## 2020-01-27 DIAGNOSIS — B36.9 CUTANEOUS FUNGAL INFECTION: ICD-10-CM

## 2020-01-27 DIAGNOSIS — K21.9 GERD WITHOUT ESOPHAGITIS: ICD-10-CM

## 2020-01-27 DIAGNOSIS — Z17.0 MALIGNANT NEOPLASM OF UPPER-OUTER QUADRANT OF LEFT BREAST IN FEMALE, ESTROGEN RECEPTOR POSITIVE (HCC): Primary | ICD-10-CM

## 2020-01-27 DIAGNOSIS — F32.89 OTHER DEPRESSION: ICD-10-CM

## 2020-01-27 DIAGNOSIS — C50.412 MALIGNANT NEOPLASM OF UPPER-OUTER QUADRANT OF LEFT BREAST IN FEMALE, ESTROGEN RECEPTOR POSITIVE (HCC): Primary | ICD-10-CM

## 2020-01-27 DIAGNOSIS — E03.9 ACQUIRED HYPOTHYROIDISM: ICD-10-CM

## 2020-01-27 LAB
ALBUMIN SERPL-MCNC: 3.9 G/DL (ref 3.4–5)
ALBUMIN/GLOB SERPL: 1.1 {RATIO} (ref 1–2)
ALP LIVER SERPL-CCNC: 68 U/L (ref 39–100)
ALT SERPL-CCNC: 52 U/L (ref 13–56)
ANION GAP SERPL CALC-SCNC: 4 MMOL/L (ref 0–18)
AST SERPL-CCNC: 34 U/L (ref 15–37)
BASOPHILS # BLD AUTO: 0.03 X10(3) UL (ref 0–0.2)
BASOPHILS NFR BLD AUTO: 0.9 %
BILIRUB SERPL-MCNC: 0.4 MG/DL (ref 0.1–2)
BUN BLD-MCNC: 10 MG/DL (ref 7–18)
BUN/CREAT SERPL: 12.5 (ref 10–20)
CALCIUM BLD-MCNC: 9.1 MG/DL (ref 8.5–10.1)
CHLORIDE SERPL-SCNC: 110 MMOL/L (ref 98–112)
CO2 SERPL-SCNC: 29 MMOL/L (ref 21–32)
CREAT BLD-MCNC: 0.8 MG/DL (ref 0.55–1.02)
DEPRECATED RDW RBC AUTO: 51.8 FL (ref 35.1–46.3)
EOSINOPHIL # BLD AUTO: 0.01 X10(3) UL (ref 0–0.7)
EOSINOPHIL NFR BLD AUTO: 0.3 %
ERYTHROCYTE [DISTWIDTH] IN BLOOD BY AUTOMATED COUNT: 15.5 % (ref 11–15)
GLOBULIN PLAS-MCNC: 3.5 G/DL (ref 2.8–4.4)
GLUCOSE BLD-MCNC: 104 MG/DL (ref 70–99)
HAV IGM SER QL: 2.1 MG/DL (ref 1.6–2.6)
HCT VFR BLD AUTO: 34.3 % (ref 35–48)
HGB BLD-MCNC: 10.6 G/DL (ref 12–16)
IMM GRANULOCYTES # BLD AUTO: 0.02 X10(3) UL (ref 0–1)
IMM GRANULOCYTES NFR BLD: 0.6 %
LYMPHOCYTES # BLD AUTO: 0.86 X10(3) UL (ref 1–4)
LYMPHOCYTES NFR BLD AUTO: 24.6 %
M PROTEIN MFR SERPL ELPH: 7.4 G/DL (ref 6.4–8.2)
MCH RBC QN AUTO: 28.1 PG (ref 26–34)
MCHC RBC AUTO-ENTMCNC: 30.9 G/DL (ref 31–37)
MCV RBC AUTO: 91 FL (ref 80–100)
MONOCYTES # BLD AUTO: 0.26 X10(3) UL (ref 0.1–1)
MONOCYTES NFR BLD AUTO: 7.4 %
NEUTROPHILS # BLD AUTO: 2.31 X10 (3) UL (ref 1.5–7.7)
NEUTROPHILS # BLD AUTO: 2.31 X10(3) UL (ref 1.5–7.7)
NEUTROPHILS NFR BLD AUTO: 66.2 %
OSMOLALITY SERPL CALC.SUM OF ELEC: 295 MOSM/KG (ref 275–295)
PATIENT FASTING Y/N/NP: NO
PLATELET # BLD AUTO: 148 10(3)UL (ref 150–450)
POTASSIUM SERPL-SCNC: 3.6 MMOL/L (ref 3.5–5.1)
RBC # BLD AUTO: 3.77 X10(6)UL (ref 3.8–5.3)
SODIUM SERPL-SCNC: 143 MMOL/L (ref 136–145)
WBC # BLD AUTO: 3.5 X10(3) UL (ref 4–11)

## 2020-01-27 PROCEDURE — 85025 COMPLETE CBC W/AUTO DIFF WBC: CPT

## 2020-01-27 PROCEDURE — 96375 TX/PRO/DX INJ NEW DRUG ADDON: CPT

## 2020-01-27 PROCEDURE — 83735 ASSAY OF MAGNESIUM: CPT

## 2020-01-27 PROCEDURE — 99215 OFFICE O/P EST HI 40 MIN: CPT | Performed by: SPECIALIST

## 2020-01-27 PROCEDURE — 80053 COMPREHEN METABOLIC PANEL: CPT

## 2020-01-27 PROCEDURE — 96417 CHEMO IV INFUS EACH ADDL SEQ: CPT

## 2020-01-27 PROCEDURE — 96413 CHEMO IV INFUSION 1 HR: CPT

## 2020-01-27 RX ORDER — FLUCONAZOLE 100 MG/1
TABLET ORAL
Qty: 8 TABLET | Refills: 0 | Status: SHIPPED | OUTPATIENT
Start: 2020-01-27 | End: 2020-02-04 | Stop reason: ALTCHOICE

## 2020-01-27 NOTE — PROGRESS NOTES
Pt here for C6.   Arrives Ambulating independently, accompanied by Friend           Patient reports possible pregnancy since last therapy cycle: No    Modifications in dose or schedule: No     Frequency of blood return and site check throughout administrati

## 2020-01-27 NOTE — PROGRESS NOTES
Patient is here today for follow up with Peyman Adkins for Breast Cancer. C6D1 TCHP . Patient denies pain. Stated fatigue at times. Decreased appetite and nausea few days post treatment. Diarrhea - controlled with diet and Imodium.  Stated numbness in finger t

## 2020-01-28 ENCOUNTER — OFFICE VISIT (OUTPATIENT)
Dept: HEMATOLOGY/ONCOLOGY | Facility: HOSPITAL | Age: 50
End: 2020-01-28
Attending: SPECIALIST
Payer: COMMERCIAL

## 2020-01-28 DIAGNOSIS — Z17.0 MALIGNANT NEOPLASM OF UPPER-OUTER QUADRANT OF LEFT BREAST IN FEMALE, ESTROGEN RECEPTOR POSITIVE (HCC): Primary | ICD-10-CM

## 2020-01-28 DIAGNOSIS — C50.412 MALIGNANT NEOPLASM OF UPPER-OUTER QUADRANT OF LEFT BREAST IN FEMALE, ESTROGEN RECEPTOR POSITIVE (HCC): Primary | ICD-10-CM

## 2020-01-28 PROCEDURE — 96372 THER/PROPH/DIAG INJ SC/IM: CPT

## 2020-01-28 NOTE — PROGRESS NOTES
Education Record    Learner:  Patient    Disease / Diagnosis: Breast Cancer    Barriers / Limitations:  None   Comments:    Method:  Brief focused   Comments:    General Topics:  Medication and Plan of care reviewed   Comments:    Outcome:  Shows Letona

## 2020-02-03 ENCOUNTER — HOSPITAL ENCOUNTER (OUTPATIENT)
Dept: MRI IMAGING | Facility: HOSPITAL | Age: 50
Discharge: HOME | End: 2020-02-03
Attending: SURGERY
Payer: COMMERCIAL

## 2020-02-03 DIAGNOSIS — Z17.0 MALIGNANT NEOPLASM OF UPPER-OUTER QUADRANT OF LEFT BREAST IN FEMALE, ESTROGEN RECEPTOR POSITIVE (HCC): ICD-10-CM

## 2020-02-03 DIAGNOSIS — C50.412 MALIGNANT NEOPLASM OF UPPER-OUTER QUADRANT OF LEFT BREAST IN FEMALE, ESTROGEN RECEPTOR POSITIVE (HCC): ICD-10-CM

## 2020-02-03 PROCEDURE — A9575 INJ GADOTERATE MEGLUMI 0.1ML: HCPCS | Performed by: SURGERY

## 2020-02-03 PROCEDURE — 77049 MRI BREAST C-+ W/CAD BI: CPT | Performed by: SURGERY

## 2020-02-04 ENCOUNTER — OFFICE VISIT (OUTPATIENT)
Dept: SURGERY | Facility: CLINIC | Age: 50
End: 2020-02-04
Payer: COMMERCIAL

## 2020-02-04 ENCOUNTER — TELEPHONE (OUTPATIENT)
Dept: SURGERY | Facility: CLINIC | Age: 50
End: 2020-02-04

## 2020-02-04 ENCOUNTER — OFFICE VISIT (OUTPATIENT)
Dept: HEMATOLOGY/ONCOLOGY | Facility: HOSPITAL | Age: 50
End: 2020-02-04
Attending: SPECIALIST
Payer: COMMERCIAL

## 2020-02-04 VITALS
HEART RATE: 91 BPM | DIASTOLIC BLOOD PRESSURE: 69 MMHG | SYSTOLIC BLOOD PRESSURE: 111 MMHG | BODY MASS INDEX: 26.76 KG/M2 | HEIGHT: 66.54 IN | TEMPERATURE: 98 F | WEIGHT: 168.5 LBS | OXYGEN SATURATION: 99 % | RESPIRATION RATE: 16 BRPM

## 2020-02-04 DIAGNOSIS — Z17.0 MALIGNANT NEOPLASM OF UPPER-OUTER QUADRANT OF LEFT BREAST IN FEMALE, ESTROGEN RECEPTOR POSITIVE (HCC): Primary | ICD-10-CM

## 2020-02-04 DIAGNOSIS — C50.412 MALIGNANT NEOPLASM OF UPPER-OUTER QUADRANT OF LEFT BREAST IN FEMALE, ESTROGEN RECEPTOR POSITIVE (HCC): Primary | ICD-10-CM

## 2020-02-04 DIAGNOSIS — C50.912 MALIGNANT NEOPLASM OF LEFT FEMALE BREAST, UNSPECIFIED ESTROGEN RECEPTOR STATUS, UNSPECIFIED SITE OF BREAST (HCC): Primary | ICD-10-CM

## 2020-02-04 DIAGNOSIS — K64.5 THROMBOSED HEMORRHOIDS: ICD-10-CM

## 2020-02-04 DIAGNOSIS — Z17.0 MALIGNANT NEOPLASM OF UPPER-OUTER QUADRANT OF LEFT BREAST IN FEMALE, ESTROGEN RECEPTOR POSITIVE (HCC): ICD-10-CM

## 2020-02-04 DIAGNOSIS — C50.412 MALIGNANT NEOPLASM OF UPPER-OUTER QUADRANT OF LEFT BREAST IN FEMALE, ESTROGEN RECEPTOR POSITIVE (HCC): ICD-10-CM

## 2020-02-04 DIAGNOSIS — K64.5 THROMBOSED HEMORRHOIDS: Primary | ICD-10-CM

## 2020-02-04 PROCEDURE — 99214 OFFICE O/P EST MOD 30 MIN: CPT | Performed by: SURGERY

## 2020-02-04 PROCEDURE — 99211 OFF/OP EST MAY X REQ PHY/QHP: CPT

## 2020-02-04 NOTE — PROGRESS NOTES
Patient is here today for follow up  with Dr. Colleen Nascimento / Rosemary Redman. Patient Denies pain. Medication list and medical history were reviewed and updated.     Education Record    Learner:  Patient and spouse    Disease / Diagnosis: Rosemary Redman / Dr. Colleen Nascimento

## 2020-02-06 DIAGNOSIS — E03.9 HYPOTHYROIDISM, UNSPECIFIED TYPE: ICD-10-CM

## 2020-02-06 RX ORDER — LEVOTHYROXINE SODIUM 0.15 MG/1
TABLET ORAL
Qty: 60 TABLET | Refills: 0 | OUTPATIENT
Start: 2020-02-06

## 2020-02-06 NOTE — PROGRESS NOTES
HPI:    Patient ID: Lam Bosch is a 51year old female who was initially seen by me in September 2019 with a diagnosis of triple positive invasive ductal carcinoma of the upper outer quadrant of the left breast.  She has received neoadjuvant chemothe PHYSICAL EXAM:   Physical Exam   Constitutional: She is oriented to person, place, and time. She appears well-developed and well-nourished. No distress. HENT:   Head: Normocephalic and atraumatic.    Eyes: Pupils are equal, round, and reactive to ligh lobe lesion measuring 0.7 x 0.5 cm which demonstrates T2 hyperintensity likely representing a cyst.     =====  CONCLUSION:     DIAGNOSTIC CATEGORY 2--BENIGN FINDING:       RECOMMENDATIONS:    ROUTINE MAMMOGRAM AND CLINICAL EVALUATION IN 12 MONTHS.        PL

## 2020-02-14 ENCOUNTER — TELEPHONE (OUTPATIENT)
Dept: MAMMOGRAPHY | Facility: HOSPITAL | Age: 50
End: 2020-02-14

## 2020-02-14 ENCOUNTER — TELEPHONE (OUTPATIENT)
Dept: SURGERY | Facility: CLINIC | Age: 50
End: 2020-02-14

## 2020-02-14 NOTE — TELEPHONE ENCOUNTER
Attempted to call patient re: education on SLN procedure and lymphedema.   Message left for patient to call back

## 2020-02-14 NOTE — TELEPHONE ENCOUNTER
Left message with pre-op instructions for surgery scheduled on 2/20. Asked that pt call back if there are any questions.

## 2020-02-17 ENCOUNTER — TELEPHONE (OUTPATIENT)
Dept: MAMMOGRAPHY | Age: 50
End: 2020-02-17

## 2020-02-17 ENCOUNTER — OFFICE VISIT (OUTPATIENT)
Dept: HEMATOLOGY/ONCOLOGY | Facility: HOSPITAL | Age: 50
End: 2020-02-17
Attending: SPECIALIST
Payer: COMMERCIAL

## 2020-02-17 VITALS
WEIGHT: 171.19 LBS | TEMPERATURE: 97 F | OXYGEN SATURATION: 97 % | DIASTOLIC BLOOD PRESSURE: 80 MMHG | SYSTOLIC BLOOD PRESSURE: 129 MMHG | HEIGHT: 66.54 IN | HEART RATE: 87 BPM | BODY MASS INDEX: 27.19 KG/M2 | RESPIRATION RATE: 16 BRPM

## 2020-02-17 DIAGNOSIS — C50.412 MALIGNANT NEOPLASM OF UPPER-OUTER QUADRANT OF LEFT BREAST IN FEMALE, ESTROGEN RECEPTOR POSITIVE (HCC): Primary | ICD-10-CM

## 2020-02-17 DIAGNOSIS — Z17.0 MALIGNANT NEOPLASM OF UPPER-OUTER QUADRANT OF LEFT BREAST IN FEMALE, ESTROGEN RECEPTOR POSITIVE (HCC): Primary | ICD-10-CM

## 2020-02-17 PROCEDURE — 96413 CHEMO IV INFUSION 1 HR: CPT

## 2020-02-17 PROCEDURE — 96417 CHEMO IV INFUS EACH ADDL SEQ: CPT

## 2020-02-17 NOTE — TELEPHONE ENCOUNTER
Spoke with pt and discussed SLN mapping scheduled for Thursday before surgery. I also provided lymphedema education as well, all of the pt's questions were answered and emotional support provided.

## 2020-02-17 NOTE — PROGRESS NOTES
Pt here for C7D1 Herceptin/Perjeta.   Arrives Ambulating independently, accompanied by Self           Patient reports possible pregnancy since last therapy cycle: No    Modifications in dose or schedule: No     Frequency of blood return and site check throu

## 2020-02-19 ENCOUNTER — TELEPHONE (OUTPATIENT)
Dept: SURGERY | Facility: CLINIC | Age: 50
End: 2020-02-19

## 2020-02-20 ENCOUNTER — HOSPITAL ENCOUNTER (OUTPATIENT)
Dept: NUCLEAR MEDICINE | Facility: HOSPITAL | Age: 50
Discharge: HOME | End: 2020-02-20
Attending: SURGERY
Payer: COMMERCIAL

## 2020-02-20 ENCOUNTER — ANESTHESIA (OUTPATIENT)
Dept: SURGERY | Facility: HOSPITAL | Age: 50
DRG: 581 | End: 2020-02-20
Payer: COMMERCIAL

## 2020-02-20 ENCOUNTER — HOSPITAL ENCOUNTER (INPATIENT)
Facility: HOSPITAL | Age: 50
LOS: 1 days | Discharge: HOME | DRG: 581 | End: 2020-02-21
Attending: SURGERY | Admitting: SURGERY
Payer: COMMERCIAL

## 2020-02-20 ENCOUNTER — MED REC SCAN ONLY (OUTPATIENT)
Dept: SURGERY | Facility: CLINIC | Age: 50
End: 2020-02-20

## 2020-02-20 ENCOUNTER — ANESTHESIA EVENT (OUTPATIENT)
Dept: SURGERY | Facility: HOSPITAL | Age: 50
DRG: 581 | End: 2020-02-20
Payer: COMMERCIAL

## 2020-02-20 DIAGNOSIS — C50.912 MALIGNANT NEOPLASM OF LEFT FEMALE BREAST, UNSPECIFIED ESTROGEN RECEPTOR STATUS, UNSPECIFIED SITE OF BREAST (HCC): ICD-10-CM

## 2020-02-20 LAB
EXPIRATION DATE: 2021
POCT LOT NUMBER: NORMAL
POCT URINE PREGNANCY: NEGATIVE

## 2020-02-20 PROCEDURE — 78195 LYMPH SYSTEM IMAGING: CPT | Performed by: SURGERY

## 2020-02-20 PROCEDURE — 0HTV0ZZ RESECTION OF BILATERAL BREAST, OPEN APPROACH: ICD-10-PCS | Performed by: SURGERY

## 2020-02-20 PROCEDURE — 07B60ZZ EXCISION OF LEFT AXILLARY LYMPHATIC, OPEN APPROACH: ICD-10-PCS | Performed by: SURGERY

## 2020-02-20 RX ORDER — METOCLOPRAMIDE HYDROCHLORIDE 5 MG/ML
10 INJECTION INTRAMUSCULAR; INTRAVENOUS EVERY 6 HOURS PRN
Status: DISCONTINUED | OUTPATIENT
Start: 2020-02-20 | End: 2020-02-21

## 2020-02-20 RX ORDER — POLYETHYLENE GLYCOL 3350 17 G/17G
17 POWDER, FOR SOLUTION ORAL DAILY PRN
Status: DISCONTINUED | OUTPATIENT
Start: 2020-02-20 | End: 2020-02-21

## 2020-02-20 RX ORDER — HYDROCODONE BITARTRATE AND ACETAMINOPHEN 5; 325 MG/1; MG/1
1 TABLET ORAL EVERY 4 HOURS PRN
Status: DISCONTINUED | OUTPATIENT
Start: 2020-02-20 | End: 2020-02-21

## 2020-02-20 RX ORDER — ONDANSETRON 2 MG/ML
4 INJECTION INTRAMUSCULAR; INTRAVENOUS EVERY 6 HOURS PRN
Status: DISCONTINUED | OUTPATIENT
Start: 2020-02-20 | End: 2020-02-21

## 2020-02-20 RX ORDER — ONDANSETRON 2 MG/ML
4 INJECTION INTRAMUSCULAR; INTRAVENOUS AS NEEDED
Status: DISCONTINUED | OUTPATIENT
Start: 2020-02-20 | End: 2020-02-20 | Stop reason: HOSPADM

## 2020-02-20 RX ORDER — LIDOCAINE HYDROCHLORIDE 10 MG/ML
INJECTION, SOLUTION EPIDURAL; INFILTRATION; INTRACAUDAL; PERINEURAL AS NEEDED
Status: DISCONTINUED | OUTPATIENT
Start: 2020-02-20 | End: 2020-02-20 | Stop reason: SURG

## 2020-02-20 RX ORDER — ONDANSETRON 2 MG/ML
INJECTION INTRAMUSCULAR; INTRAVENOUS AS NEEDED
Status: DISCONTINUED | OUTPATIENT
Start: 2020-02-20 | End: 2020-02-20 | Stop reason: SURG

## 2020-02-20 RX ORDER — LABETALOL HYDROCHLORIDE 5 MG/ML
5 INJECTION, SOLUTION INTRAVENOUS EVERY 5 MIN PRN
Status: DISCONTINUED | OUTPATIENT
Start: 2020-02-20 | End: 2020-02-20 | Stop reason: HOSPADM

## 2020-02-20 RX ORDER — SODIUM CHLORIDE, SODIUM LACTATE, POTASSIUM CHLORIDE, CALCIUM CHLORIDE 600; 310; 30; 20 MG/100ML; MG/100ML; MG/100ML; MG/100ML
INJECTION, SOLUTION INTRAVENOUS CONTINUOUS
Status: DISCONTINUED | OUTPATIENT
Start: 2020-02-20 | End: 2020-02-21

## 2020-02-20 RX ORDER — HYDROCODONE BITARTRATE AND ACETAMINOPHEN 5; 325 MG/1; MG/1
1 TABLET ORAL AS NEEDED
Status: DISCONTINUED | OUTPATIENT
Start: 2020-02-20 | End: 2020-02-20 | Stop reason: HOSPADM

## 2020-02-20 RX ORDER — DEXAMETHASONE SODIUM PHOSPHATE 4 MG/ML
4 VIAL (ML) INJECTION AS NEEDED
Status: DISCONTINUED | OUTPATIENT
Start: 2020-02-20 | End: 2020-02-20 | Stop reason: HOSPADM

## 2020-02-20 RX ORDER — HYDROCODONE BITARTRATE AND ACETAMINOPHEN 5; 325 MG/1; MG/1
2 TABLET ORAL EVERY 4 HOURS PRN
Status: DISCONTINUED | OUTPATIENT
Start: 2020-02-20 | End: 2020-02-21

## 2020-02-20 RX ORDER — HYDROMORPHONE HYDROCHLORIDE 1 MG/ML
0.2 INJECTION, SOLUTION INTRAMUSCULAR; INTRAVENOUS; SUBCUTANEOUS EVERY 2 HOUR PRN
Status: DISCONTINUED | OUTPATIENT
Start: 2020-02-20 | End: 2020-02-21

## 2020-02-20 RX ORDER — LEVOTHYROXINE SODIUM 0.15 MG/1
300 TABLET ORAL
Status: DISCONTINUED | OUTPATIENT
Start: 2020-02-21 | End: 2020-02-21

## 2020-02-20 RX ORDER — ACETAMINOPHEN 325 MG/1
650 TABLET ORAL EVERY 4 HOURS PRN
Status: DISCONTINUED | OUTPATIENT
Start: 2020-02-20 | End: 2020-02-21

## 2020-02-20 RX ORDER — LEVOTHYROXINE SODIUM 0.15 MG/1
300 TABLET ORAL
COMMUNITY
End: 2020-03-10

## 2020-02-20 RX ORDER — MIDAZOLAM HYDROCHLORIDE 1 MG/ML
INJECTION INTRAMUSCULAR; INTRAVENOUS AS NEEDED
Status: DISCONTINUED | OUTPATIENT
Start: 2020-02-20 | End: 2020-02-20 | Stop reason: SURG

## 2020-02-20 RX ORDER — HYDROMORPHONE HYDROCHLORIDE 1 MG/ML
INJECTION, SOLUTION INTRAMUSCULAR; INTRAVENOUS; SUBCUTANEOUS
Status: COMPLETED
Start: 2020-02-20 | End: 2020-02-20

## 2020-02-20 RX ORDER — SCOLOPAMINE TRANSDERMAL SYSTEM 1 MG/1
PATCH, EXTENDED RELEASE TRANSDERMAL AS NEEDED
Status: DISCONTINUED | OUTPATIENT
Start: 2020-02-20 | End: 2020-02-20 | Stop reason: SURG

## 2020-02-20 RX ORDER — CEFAZOLIN SODIUM/WATER 2 G/20 ML
2 SYRINGE (ML) INTRAVENOUS EVERY 8 HOURS
Status: COMPLETED | OUTPATIENT
Start: 2020-02-20 | End: 2020-02-21

## 2020-02-20 RX ORDER — ACETAMINOPHEN 500 MG
1000 TABLET ORAL ONCE
Status: DISCONTINUED | OUTPATIENT
Start: 2020-02-20 | End: 2020-02-20 | Stop reason: HOSPADM

## 2020-02-20 RX ORDER — HYDROMORPHONE HYDROCHLORIDE 1 MG/ML
0.8 INJECTION, SOLUTION INTRAMUSCULAR; INTRAVENOUS; SUBCUTANEOUS EVERY 2 HOUR PRN
Status: DISCONTINUED | OUTPATIENT
Start: 2020-02-20 | End: 2020-02-21

## 2020-02-20 RX ORDER — SERTRALINE HYDROCHLORIDE 100 MG/1
100 TABLET, FILM COATED ORAL DAILY
Status: DISCONTINUED | OUTPATIENT
Start: 2020-02-21 | End: 2020-02-21

## 2020-02-20 RX ORDER — MIDAZOLAM HYDROCHLORIDE 1 MG/ML
1 INJECTION INTRAMUSCULAR; INTRAVENOUS EVERY 5 MIN PRN
Status: DISCONTINUED | OUTPATIENT
Start: 2020-02-20 | End: 2020-02-20 | Stop reason: HOSPADM

## 2020-02-20 RX ORDER — FAMOTIDINE 20 MG/1
20 TABLET ORAL 2 TIMES DAILY
Status: DISCONTINUED | OUTPATIENT
Start: 2020-02-20 | End: 2020-02-21

## 2020-02-20 RX ORDER — ACETAMINOPHEN 500 MG
1000 TABLET ORAL ONCE AS NEEDED
Status: DISCONTINUED | OUTPATIENT
Start: 2020-02-20 | End: 2020-02-20 | Stop reason: HOSPADM

## 2020-02-20 RX ORDER — LIDOCAINE AND PRILOCAINE 25; 25 MG/G; MG/G
CREAM TOPICAL ONCE
Status: COMPLETED | OUTPATIENT
Start: 2020-02-20 | End: 2020-02-20

## 2020-02-20 RX ORDER — NALOXONE HYDROCHLORIDE 0.4 MG/ML
80 INJECTION, SOLUTION INTRAMUSCULAR; INTRAVENOUS; SUBCUTANEOUS AS NEEDED
Status: DISCONTINUED | OUTPATIENT
Start: 2020-02-20 | End: 2020-02-20 | Stop reason: HOSPADM

## 2020-02-20 RX ORDER — CEFAZOLIN SODIUM/WATER 2 G/20 ML
2 SYRINGE (ML) INTRAVENOUS ONCE
Status: COMPLETED | OUTPATIENT
Start: 2020-02-20 | End: 2020-02-20

## 2020-02-20 RX ORDER — HYDROCODONE BITARTRATE AND ACETAMINOPHEN 5; 325 MG/1; MG/1
2 TABLET ORAL AS NEEDED
Status: DISCONTINUED | OUTPATIENT
Start: 2020-02-20 | End: 2020-02-20 | Stop reason: HOSPADM

## 2020-02-20 RX ORDER — KETOROLAC TROMETHAMINE 15 MG/ML
15 INJECTION, SOLUTION INTRAMUSCULAR; INTRAVENOUS EVERY 6 HOURS PRN
Status: DISCONTINUED | OUTPATIENT
Start: 2020-02-20 | End: 2020-02-21

## 2020-02-20 RX ORDER — SODIUM PHOSPHATE, DIBASIC AND SODIUM PHOSPHATE, MONOBASIC 7; 19 G/133ML; G/133ML
1 ENEMA RECTAL ONCE AS NEEDED
Status: DISCONTINUED | OUTPATIENT
Start: 2020-02-20 | End: 2020-02-21

## 2020-02-20 RX ORDER — ROCURONIUM BROMIDE 10 MG/ML
INJECTION, SOLUTION INTRAVENOUS AS NEEDED
Status: DISCONTINUED | OUTPATIENT
Start: 2020-02-20 | End: 2020-02-20 | Stop reason: SURG

## 2020-02-20 RX ORDER — DEXAMETHASONE SODIUM PHOSPHATE 4 MG/ML
VIAL (ML) INJECTION AS NEEDED
Status: DISCONTINUED | OUTPATIENT
Start: 2020-02-20 | End: 2020-02-20 | Stop reason: SURG

## 2020-02-20 RX ORDER — SODIUM CHLORIDE, SODIUM LACTATE, POTASSIUM CHLORIDE, CALCIUM CHLORIDE 600; 310; 30; 20 MG/100ML; MG/100ML; MG/100ML; MG/100ML
INJECTION, SOLUTION INTRAVENOUS CONTINUOUS
Status: DISCONTINUED | OUTPATIENT
Start: 2020-02-20 | End: 2020-02-20 | Stop reason: HOSPADM

## 2020-02-20 RX ORDER — HYDROMORPHONE HYDROCHLORIDE 1 MG/ML
0.4 INJECTION, SOLUTION INTRAMUSCULAR; INTRAVENOUS; SUBCUTANEOUS EVERY 2 HOUR PRN
Status: DISCONTINUED | OUTPATIENT
Start: 2020-02-20 | End: 2020-02-21

## 2020-02-20 RX ORDER — 0.9 % SODIUM CHLORIDE 0.9 %
VIAL (ML) INJECTION AS NEEDED
Status: DISCONTINUED | OUTPATIENT
Start: 2020-02-20 | End: 2020-02-20 | Stop reason: HOSPADM

## 2020-02-20 RX ORDER — DIAZEPAM 5 MG/1
5 TABLET ORAL AS NEEDED
Status: DISCONTINUED | OUTPATIENT
Start: 2020-02-20 | End: 2020-02-20 | Stop reason: HOSPADM

## 2020-02-20 RX ORDER — MEPERIDINE HYDROCHLORIDE 25 MG/ML
12.5 INJECTION INTRAMUSCULAR; INTRAVENOUS; SUBCUTANEOUS AS NEEDED
Status: DISCONTINUED | OUTPATIENT
Start: 2020-02-20 | End: 2020-02-20 | Stop reason: HOSPADM

## 2020-02-20 RX ORDER — HYDROMORPHONE HYDROCHLORIDE 1 MG/ML
0.4 INJECTION, SOLUTION INTRAMUSCULAR; INTRAVENOUS; SUBCUTANEOUS EVERY 5 MIN PRN
Status: DISCONTINUED | OUTPATIENT
Start: 2020-02-20 | End: 2020-02-20 | Stop reason: HOSPADM

## 2020-02-20 RX ORDER — LEVOTHYROXINE SODIUM 0.05 MG/1
50 TABLET ORAL
Status: DISCONTINUED | OUTPATIENT
Start: 2020-02-21 | End: 2020-02-21

## 2020-02-20 RX ORDER — GLYCOPYRROLATE 0.2 MG/ML
INJECTION, SOLUTION INTRAMUSCULAR; INTRAVENOUS AS NEEDED
Status: DISCONTINUED | OUTPATIENT
Start: 2020-02-20 | End: 2020-02-20 | Stop reason: SURG

## 2020-02-20 RX ORDER — METOCLOPRAMIDE HYDROCHLORIDE 5 MG/ML
INJECTION INTRAMUSCULAR; INTRAVENOUS AS NEEDED
Status: DISCONTINUED | OUTPATIENT
Start: 2020-02-20 | End: 2020-02-20 | Stop reason: SURG

## 2020-02-20 RX ORDER — DOCUSATE SODIUM 100 MG/1
100 CAPSULE, LIQUID FILLED ORAL 2 TIMES DAILY
Status: DISCONTINUED | OUTPATIENT
Start: 2020-02-20 | End: 2020-02-21

## 2020-02-20 RX ORDER — TRAMADOL HYDROCHLORIDE 50 MG/1
50 TABLET ORAL EVERY 6 HOURS PRN
Status: DISCONTINUED | OUTPATIENT
Start: 2020-02-20 | End: 2020-02-21

## 2020-02-20 RX ORDER — ENOXAPARIN SODIUM 100 MG/ML
40 INJECTION SUBCUTANEOUS NIGHTLY
Status: DISCONTINUED | OUTPATIENT
Start: 2020-02-20 | End: 2020-02-21

## 2020-02-20 RX ORDER — METOCLOPRAMIDE HYDROCHLORIDE 5 MG/ML
10 INJECTION INTRAMUSCULAR; INTRAVENOUS AS NEEDED
Status: DISCONTINUED | OUTPATIENT
Start: 2020-02-20 | End: 2020-02-20 | Stop reason: HOSPADM

## 2020-02-20 RX ORDER — NEOSTIGMINE METHYLSULFATE 1 MG/ML
INJECTION INTRAVENOUS AS NEEDED
Status: DISCONTINUED | OUTPATIENT
Start: 2020-02-20 | End: 2020-02-20 | Stop reason: SURG

## 2020-02-20 RX ORDER — BISACODYL 10 MG
10 SUPPOSITORY, RECTAL RECTAL
Status: DISCONTINUED | OUTPATIENT
Start: 2020-02-20 | End: 2020-02-21

## 2020-02-20 RX ORDER — FAMOTIDINE 10 MG/ML
20 INJECTION, SOLUTION INTRAVENOUS 2 TIMES DAILY
Status: DISCONTINUED | OUTPATIENT
Start: 2020-02-20 | End: 2020-02-21

## 2020-02-20 RX ADMIN — SCOLOPAMINE TRANSDERMAL SYSTEM 1 PATCH: 1 PATCH, EXTENDED RELEASE TRANSDERMAL at 12:33:00

## 2020-02-20 RX ADMIN — SODIUM CHLORIDE, SODIUM LACTATE, POTASSIUM CHLORIDE, CALCIUM CHLORIDE: 600; 310; 30; 20 INJECTION, SOLUTION INTRAVENOUS at 15:22:00

## 2020-02-20 RX ADMIN — CEFAZOLIN SODIUM/WATER 2 G: 2 G/20 ML SYRINGE (ML) INTRAVENOUS at 12:51:00

## 2020-02-20 RX ADMIN — ROCURONIUM BROMIDE 10 MG: 10 INJECTION, SOLUTION INTRAVENOUS at 13:19:00

## 2020-02-20 RX ADMIN — METOCLOPRAMIDE HYDROCHLORIDE 10 MG: 5 INJECTION INTRAMUSCULAR; INTRAVENOUS at 14:54:00

## 2020-02-20 RX ADMIN — ONDANSETRON 4 MG: 2 INJECTION INTRAMUSCULAR; INTRAVENOUS at 14:54:00

## 2020-02-20 RX ADMIN — ROCURONIUM BROMIDE 10 MG: 10 INJECTION, SOLUTION INTRAVENOUS at 14:27:00

## 2020-02-20 RX ADMIN — NEOSTIGMINE METHYLSULFATE 5 MG: 1 INJECTION INTRAVENOUS at 15:06:00

## 2020-02-20 RX ADMIN — MIDAZOLAM HYDROCHLORIDE 2 MG: 1 INJECTION INTRAMUSCULAR; INTRAVENOUS at 12:27:00

## 2020-02-20 RX ADMIN — LIDOCAINE HYDROCHLORIDE 50 MG: 10 INJECTION, SOLUTION EPIDURAL; INFILTRATION; INTRACAUDAL; PERINEURAL at 12:35:00

## 2020-02-20 RX ADMIN — DEXAMETHASONE SODIUM PHOSPHATE 4 MG: 4 MG/ML VIAL (ML) INJECTION at 14:54:00

## 2020-02-20 RX ADMIN — ROCURONIUM BROMIDE 15 MG: 10 INJECTION, SOLUTION INTRAVENOUS at 14:09:00

## 2020-02-20 RX ADMIN — GLYCOPYRROLATE 1 MG: 0.2 INJECTION, SOLUTION INTRAMUSCULAR; INTRAVENOUS at 15:06:00

## 2020-02-20 RX ADMIN — ROCURONIUM BROMIDE 40 MG: 10 INJECTION, SOLUTION INTRAVENOUS at 12:35:00

## 2020-02-20 RX ADMIN — SODIUM CHLORIDE, SODIUM LACTATE, POTASSIUM CHLORIDE, CALCIUM CHLORIDE: 600; 310; 30; 20 INJECTION, SOLUTION INTRAVENOUS at 13:32:00

## 2020-02-20 NOTE — ANESTHESIA PREPROCEDURE EVALUATION
PRE-OP EVALUATION    Patient Name: Latia Manning    Pre-op Diagnosis: Malignant neoplasm of left female breast, unspecified estrogen receptor status, unspecified site of [facility] [C50.912]    Procedure(s):  BILATERAL MASTECTOMY, LEFT BREAST SENTINEL history of anesthetic complications  History of: PONV       GI/Hepatic/Renal                                 Cardiovascular      ECG reviewed.   Exercise tolerance: good     MET: >4                                           Endo/Other           (+) hypothyr Pulmonary      Breath sounds clear to auscultation bilaterally. Other findings            ASA: 3   Plan: general  NPO status verified and patient meets guidelines. Post-procedure pain management plan discussed with surgeon and patient.   Garcia

## 2020-02-20 NOTE — ANESTHESIA PROCEDURE NOTES
[facility]  Performed by: Phil Segundo MD  Authorized by: Phil Segundo MD       General Information and Staff    Start Time:  2/20/2020 12:44 PM  End Time:  2/20/2020 12:59 PM  Anesthesiologist:  Phil Segundo MD  Performed by:   Anesthesiologis

## 2020-02-20 NOTE — ANESTHESIA POSTPROCEDURE EVALUATION
[address] Patient Status:  Surgery Admit - Inpt   Age/Gender 51year old female MRN [id number]   Location [address] Attending Nicklas Nyhan, MD   Hosp Day # 0 PCP MD Enriqueta Parr

## 2020-02-20 NOTE — OPERATIVE REPORT
DATE OF OPERATION:  2/20/2020  PREOPERATIVE DIAGNOSIS: Left breast invasive ductal carcinoma, upper outer quadrant  POSTOPERATIVE DIAGNOSIS: Left breast invasive ductal carcinoma, upper outer quadrant    PROCEDURE PERFORMED: Bilateral mastectomy, injection dissection was then used to dissect tissue flaps going superiorly, medially, inferiorly, and laterally.  The breast tissue was then grasped and excised off the underlying pectoralis muscle using cautery.  The specimen was marked with a stitch in the axilla was then used to obtain hemostasis.  The wound was irrigated with normal saline.  A marking stitch was placed in the axillary tail of the breast, and the tissue was sent off the field.  A 19 [address] Marie Mayito drain was passed out inferior and lateral to the [employer]

## 2020-02-20 NOTE — H&P
History & Physical Examination    Patient Name: Sissy Larson  MRN: [id number]  [facility]: [id number]  YOB: 1970    Diagnosis: Left breast cancer    Present Illness: Sissy Larson is a 51year old female who was initially seen by me in [facility] mouth every 8 (eight) hours as needed for Nausea., Disp: 30 tablet, Rfl: 3, More than a month at Unknown time      lactated ringers infusion, , Intravenous, Continuous  [MAR Hold] acetaminophen (TYLENOL EXTRA STRENGTH) tab 1,000 mg, 1,000 mg, Oral, Once  c Normal Check if Physical Exam is Normal If not normal, please explain:   HEENT [x ] [x ]    NECK & BACK [x ] [x ]    HEART [x ] [x ]    LUNGS [x ] [x ]    ABDOMEN [x ] [x ]    UROGENITAL [ ] [ ] hemorrhoids   EXTREMITIES [x ] [x ]    OTHER   +L breast canc

## 2020-02-21 ENCOUNTER — NURSE NAVIGATOR ENCOUNTER (OUTPATIENT)
Dept: HEMATOLOGY/ONCOLOGY | Facility: HOSPITAL | Age: 50
End: 2020-02-21

## 2020-02-21 VITALS
TEMPERATURE: 98 F | OXYGEN SATURATION: 96 % | WEIGHT: 170.63 LBS | DIASTOLIC BLOOD PRESSURE: 55 MMHG | HEART RATE: 68 BPM | BODY MASS INDEX: 27.42 KG/M2 | RESPIRATION RATE: 18 BRPM | HEIGHT: 66 IN | SYSTOLIC BLOOD PRESSURE: 97 MMHG

## 2020-02-21 RX ORDER — CEFADROXIL 500 MG/1
500 CAPSULE ORAL 2 TIMES DAILY
Qty: 20 CAPSULE | Refills: 0 | Status: SHIPPED | OUTPATIENT
Start: 2020-02-21 | End: 2020-03-09 | Stop reason: ALTCHOICE

## 2020-02-21 RX ORDER — HYDROCODONE BITARTRATE AND ACETAMINOPHEN 5; 325 MG/1; MG/1
1 TABLET ORAL EVERY 6 HOURS PRN
Qty: 20 TABLET | Refills: 0 | Status: SHIPPED | OUTPATIENT
Start: 2020-02-21 | End: 2020-03-23 | Stop reason: ALTCHOICE

## 2020-02-21 NOTE — PROGRESS NOTES
NURSING ADMISSION NOTE      Patient admitted via Cart  Oriented to room. Safety precautions initiated. Bed in low position. Call light in reach. RECEIVED PATIENT FROM RECOVERY ROOM .  ALERT AND ORIENT X 4 , ON O2  2L/NC , C POX , O2 SAT 96% , DENIES

## 2020-02-21 NOTE — PLAN OF CARE
Problem: PAIN - ADULT  Goal: Verbalizes/displays adequate comfort level or patient's stated pain goal  Description  INTERVENTIONS:  - Encourage pt to monitor pain and request assistance  - Assess pain using appropriate pain scale  - Administer analgesic WT and lab values  - Obtain nutritional consult as needed  - Optimize oral hygiene and moisture  - Encourage food from home; allow for food preferences  - Enhance eating environment  Outcome: Progressing     Problem: SKIN/TISSUE INTEGRITY - ADULT  Goal: Sk

## 2020-02-21 NOTE — PROGRESS NOTES
Met with patient post op bilateral mastectomy with no reconstruction. Pt is doing well post operatively, she has been up and walking in the diehl. She is eating and drinking without issue.  Discussed lymphedema precautions and meeting with OT post operativel

## 2020-02-21 NOTE — PROGRESS NOTES
[facility]  Progress Note    Latrell Ojeda Patient Status:  Inpatient    1970 MRN [id number]   [facility] 3NW-A Attending Chioma Dhillon MD   Hosp Day # 1 PCP Shelly Petersen MD     Subjective:  Pt feeling good.   Josy

## 2020-02-21 NOTE — PROGRESS NOTES
Pt d/c home. D/c instructions given to pt & pt's family, including instructions to  rx for abx & norco @ her pharmacy, review of home meds, diet, hydration, wound care, domenica drain care & f/u care. Verbalized understanding of all instructions.   Left

## 2020-02-21 NOTE — PLAN OF CARE
Pt up ambulating in halls w/ .   Gait slow & steady  States norco provides adequate pain relief  Tolerating low residue diet  Denies n&v  Voiding w/out difficulty  Tammie torrez breast care coordinator here to see pt & this time and performing dressing

## 2020-02-24 ENCOUNTER — TELEPHONE (OUTPATIENT)
Dept: SURGERY | Facility: CLINIC | Age: 50
End: 2020-02-24

## 2020-02-24 NOTE — TELEPHONE ENCOUNTER
Pt had double mastectomy last week and reviewed pre-op instructions for surgery scheduled on 3/2.  Pt VU

## 2020-02-25 ENCOUNTER — OFFICE VISIT (OUTPATIENT)
Dept: HEMATOLOGY/ONCOLOGY | Facility: HOSPITAL | Age: 50
End: 2020-02-25
Attending: SPECIALIST
Payer: COMMERCIAL

## 2020-02-25 ENCOUNTER — OFFICE VISIT (OUTPATIENT)
Dept: SURGERY | Facility: CLINIC | Age: 50
End: 2020-02-25

## 2020-02-25 VITALS
WEIGHT: 166.19 LBS | BODY MASS INDEX: 26.39 KG/M2 | RESPIRATION RATE: 16 BRPM | SYSTOLIC BLOOD PRESSURE: 114 MMHG | TEMPERATURE: 98 F | HEART RATE: 82 BPM | OXYGEN SATURATION: 98 % | HEIGHT: 66.54 IN | DIASTOLIC BLOOD PRESSURE: 73 MMHG

## 2020-02-25 DIAGNOSIS — Z17.0 MALIGNANT NEOPLASM OF UPPER-OUTER QUADRANT OF LEFT BREAST IN FEMALE, ESTROGEN RECEPTOR POSITIVE (HCC): Primary | ICD-10-CM

## 2020-02-25 DIAGNOSIS — C50.412 MALIGNANT NEOPLASM OF UPPER-OUTER QUADRANT OF LEFT BREAST IN FEMALE, ESTROGEN RECEPTOR POSITIVE (HCC): Primary | ICD-10-CM

## 2020-02-25 DIAGNOSIS — Z17.0 MALIGNANT NEOPLASM OF UPPER-OUTER QUADRANT OF LEFT BREAST IN FEMALE, ESTROGEN RECEPTOR POSITIVE (HCC): ICD-10-CM

## 2020-02-25 DIAGNOSIS — C50.412 MALIGNANT NEOPLASM OF UPPER-OUTER QUADRANT OF LEFT BREAST IN FEMALE, ESTROGEN RECEPTOR POSITIVE (HCC): ICD-10-CM

## 2020-02-25 PROCEDURE — 99211 OFF/OP EST MAY X REQ PHY/QHP: CPT

## 2020-02-25 PROCEDURE — 99024 POSTOP FOLLOW-UP VISIT: CPT | Performed by: SURGERY

## 2020-02-25 NOTE — PROGRESS NOTES
Patient is here today for post surgical follow up with Dr. Petar Bailey. Patient stated surgical site discomfort. Medication list and medical history were reviewed and updated.      Education Record    Learner:  Patient    Disease / Diagnosis: Breast Cancer    B

## 2020-02-25 NOTE — PROGRESS NOTES
HPI:    Patient ID: Estella Singh is a 51year old female who underwent bilateral mastectomy with sentinel lymph node biopsy on February 20, 2020 for triple positive left breast invasive ductal carcinoma, status post neoadjuvant chemotherapy.   She de Neck: Trachea normal and full passive range of motion without pain. Neck supple. No JVD present. Pulmonary/Chest: She exhibits no mass. Right breast exhibits no mass, no skin change and no tenderness.  Left breast exhibits no mass, no skin change and no t -See comment.     Electronically signed by Brooklynn Freeman MD on 2/24/2020               ASSESSMENT/PLAN:   Malignant neoplasm of upper-outer quadrant of left breast in female, estrogen receptor positive ([facility])  (primary encounter diagnosis)    · Happily, the

## 2020-02-27 ENCOUNTER — MED REC SCAN ONLY (OUTPATIENT)
Dept: SURGERY | Facility: CLINIC | Age: 50
End: 2020-02-27

## 2020-03-01 ENCOUNTER — ANESTHESIA EVENT (OUTPATIENT)
Dept: SURGERY | Facility: HOSPITAL | Age: 50
End: 2020-03-01
Payer: COMMERCIAL

## 2020-03-01 NOTE — ANESTHESIA PREPROCEDURE EVALUATION
PRE-OP EVALUATION    Patient Name: Earlyne Me    Pre-op Diagnosis: Thrombosed hemorrhoids [K64.5]    Procedure(s):  EXCISIONAL HEMORRHOIDECTOMY    Surgeon(s) and Role:     * Donnis Ormond, MD - Primary    Pre-op vitals reviewed.         Body Use      Smoking status: Never Smoker      Smokeless tobacco: Never Used    Alcohol use: Yes      Frequency: 2-4 times a month      Drinks per session: 1 or 2      Binge frequency: Never      Drug use: No     Available pre-op labs reviewed.   Lab Results

## 2020-03-02 ENCOUNTER — ANESTHESIA (OUTPATIENT)
Dept: SURGERY | Facility: HOSPITAL | Age: 50
End: 2020-03-02
Payer: COMMERCIAL

## 2020-03-02 ENCOUNTER — HOSPITAL ENCOUNTER (OUTPATIENT)
Facility: HOSPITAL | Age: 50
Setting detail: HOSPITAL OUTPATIENT SURGERY
Discharge: HOME | End: 2020-03-02
Attending: SURGERY | Admitting: SURGERY
Payer: COMMERCIAL

## 2020-03-02 VITALS
DIASTOLIC BLOOD PRESSURE: 56 MMHG | HEIGHT: 66 IN | BODY MASS INDEX: 26.75 KG/M2 | HEART RATE: 70 BPM | TEMPERATURE: 98 F | SYSTOLIC BLOOD PRESSURE: 104 MMHG | OXYGEN SATURATION: 95 % | RESPIRATION RATE: 15 BRPM | WEIGHT: 166.44 LBS

## 2020-03-02 DIAGNOSIS — K64.5 THROMBOSED HEMORRHOIDS: ICD-10-CM

## 2020-03-02 LAB
POCT LOT NUMBER: NORMAL
POCT URINE PREGNANCY: NEGATIVE

## 2020-03-02 PROCEDURE — 76937 US GUIDE VASCULAR ACCESS: CPT | Performed by: SURGERY

## 2020-03-02 PROCEDURE — 06BY0ZC EXCISION OF HEMORRHOIDAL PLEXUS, OPEN APPROACH: ICD-10-PCS | Performed by: SURGERY

## 2020-03-02 PROCEDURE — 36410 VNPNXR 3YR/> PHY/QHP DX/THER: CPT | Performed by: SURGERY

## 2020-03-02 PROCEDURE — 81025 URINE PREGNANCY TEST: CPT | Performed by: SURGERY

## 2020-03-02 PROCEDURE — 88304 TISSUE EXAM BY PATHOLOGIST: CPT | Performed by: SURGERY

## 2020-03-02 RX ORDER — ONDANSETRON 2 MG/ML
4 INJECTION INTRAMUSCULAR; INTRAVENOUS AS NEEDED
Status: DISCONTINUED | OUTPATIENT
Start: 2020-03-02 | End: 2020-03-02

## 2020-03-02 RX ORDER — MIDAZOLAM HYDROCHLORIDE 1 MG/ML
INJECTION INTRAMUSCULAR; INTRAVENOUS AS NEEDED
Status: DISCONTINUED | OUTPATIENT
Start: 2020-03-02 | End: 2020-03-02 | Stop reason: SURG

## 2020-03-02 RX ORDER — LIDOCAINE HYDROCHLORIDE 10 MG/ML
INJECTION, SOLUTION EPIDURAL; INFILTRATION; INTRACAUDAL; PERINEURAL AS NEEDED
Status: DISCONTINUED | OUTPATIENT
Start: 2020-03-02 | End: 2020-03-02 | Stop reason: SURG

## 2020-03-02 RX ORDER — GLYCOPYRROLATE 0.2 MG/ML
INJECTION, SOLUTION INTRAMUSCULAR; INTRAVENOUS AS NEEDED
Status: DISCONTINUED | OUTPATIENT
Start: 2020-03-02 | End: 2020-03-02 | Stop reason: SURG

## 2020-03-02 RX ORDER — ACETAMINOPHEN 500 MG
1000 TABLET ORAL ONCE
Status: DISCONTINUED | OUTPATIENT
Start: 2020-03-02 | End: 2020-03-02 | Stop reason: HOSPADM

## 2020-03-02 RX ORDER — HYDROMORPHONE HYDROCHLORIDE 1 MG/ML
0.4 INJECTION, SOLUTION INTRAMUSCULAR; INTRAVENOUS; SUBCUTANEOUS EVERY 5 MIN PRN
Status: DISCONTINUED | OUTPATIENT
Start: 2020-03-02 | End: 2020-03-02

## 2020-03-02 RX ORDER — HYDROCODONE BITARTRATE AND ACETAMINOPHEN 5; 325 MG/1; MG/1
1 TABLET ORAL AS NEEDED
Status: DISCONTINUED | OUTPATIENT
Start: 2020-03-02 | End: 2020-03-02

## 2020-03-02 RX ORDER — ACETAMINOPHEN 500 MG
1000 TABLET ORAL EVERY 6 HOURS PRN
COMMUNITY
End: 2020-03-09 | Stop reason: ALTCHOICE

## 2020-03-02 RX ORDER — BUPIVACAINE HYDROCHLORIDE AND EPINEPHRINE 5; 5 MG/ML; UG/ML
INJECTION, SOLUTION EPIDURAL; INTRACAUDAL; PERINEURAL AS NEEDED
Status: DISCONTINUED | OUTPATIENT
Start: 2020-03-02 | End: 2020-03-02 | Stop reason: HOSPADM

## 2020-03-02 RX ORDER — ROCURONIUM BROMIDE 10 MG/ML
INJECTION, SOLUTION INTRAVENOUS AS NEEDED
Status: DISCONTINUED | OUTPATIENT
Start: 2020-03-02 | End: 2020-03-02 | Stop reason: SURG

## 2020-03-02 RX ORDER — IBUPROFEN 200 MG
600 TABLET ORAL ONCE AS NEEDED
Status: DISCONTINUED | OUTPATIENT
Start: 2020-03-02 | End: 2020-03-02

## 2020-03-02 RX ORDER — METOCLOPRAMIDE HYDROCHLORIDE 5 MG/ML
10 INJECTION INTRAMUSCULAR; INTRAVENOUS AS NEEDED
Status: DISCONTINUED | OUTPATIENT
Start: 2020-03-02 | End: 2020-03-02

## 2020-03-02 RX ORDER — SODIUM CHLORIDE, SODIUM LACTATE, POTASSIUM CHLORIDE, CALCIUM CHLORIDE 600; 310; 30; 20 MG/100ML; MG/100ML; MG/100ML; MG/100ML
INJECTION, SOLUTION INTRAVENOUS CONTINUOUS
Status: DISCONTINUED | OUTPATIENT
Start: 2020-03-02 | End: 2020-03-02

## 2020-03-02 RX ORDER — DEXAMETHASONE SODIUM PHOSPHATE 4 MG/ML
VIAL (ML) INJECTION AS NEEDED
Status: DISCONTINUED | OUTPATIENT
Start: 2020-03-02 | End: 2020-03-02 | Stop reason: SURG

## 2020-03-02 RX ORDER — HYDROCODONE BITARTRATE AND ACETAMINOPHEN 5; 325 MG/1; MG/1
2 TABLET ORAL AS NEEDED
Status: DISCONTINUED | OUTPATIENT
Start: 2020-03-02 | End: 2020-03-02

## 2020-03-02 RX ORDER — LABETALOL HYDROCHLORIDE 5 MG/ML
5 INJECTION, SOLUTION INTRAVENOUS EVERY 5 MIN PRN
Status: DISCONTINUED | OUTPATIENT
Start: 2020-03-02 | End: 2020-03-02

## 2020-03-02 RX ORDER — DOCUSATE SODIUM 100 MG/1
100 CAPSULE, LIQUID FILLED ORAL 2 TIMES DAILY
Qty: 30 CAPSULE | Refills: 0 | Status: SHIPPED | OUTPATIENT
Start: 2020-03-02 | End: 2020-06-22

## 2020-03-02 RX ORDER — HEPARIN SODIUM 5000 [USP'U]/ML
5000 INJECTION, SOLUTION INTRAVENOUS; SUBCUTANEOUS ONCE
Status: COMPLETED | OUTPATIENT
Start: 2020-03-02 | End: 2020-03-02

## 2020-03-02 RX ORDER — NALOXONE HYDROCHLORIDE 0.4 MG/ML
80 INJECTION, SOLUTION INTRAMUSCULAR; INTRAVENOUS; SUBCUTANEOUS AS NEEDED
Status: DISCONTINUED | OUTPATIENT
Start: 2020-03-02 | End: 2020-03-02

## 2020-03-02 RX ORDER — LIDOCAINE HYDROCHLORIDE 20 MG/ML
JELLY TOPICAL AS NEEDED
Status: DISCONTINUED | OUTPATIENT
Start: 2020-03-02 | End: 2020-03-02 | Stop reason: HOSPADM

## 2020-03-02 RX ORDER — DEXAMETHASONE SODIUM PHOSPHATE 4 MG/ML
4 VIAL (ML) INJECTION AS NEEDED
Status: DISCONTINUED | OUTPATIENT
Start: 2020-03-02 | End: 2020-03-02

## 2020-03-02 RX ORDER — ONDANSETRON 2 MG/ML
INJECTION INTRAMUSCULAR; INTRAVENOUS AS NEEDED
Status: DISCONTINUED | OUTPATIENT
Start: 2020-03-02 | End: 2020-03-02 | Stop reason: SURG

## 2020-03-02 RX ORDER — NEOSTIGMINE METHYLSULFATE 1 MG/ML
INJECTION INTRAVENOUS AS NEEDED
Status: DISCONTINUED | OUTPATIENT
Start: 2020-03-02 | End: 2020-03-02 | Stop reason: SURG

## 2020-03-02 RX ADMIN — ROCURONIUM BROMIDE 20 MG: 10 INJECTION, SOLUTION INTRAVENOUS at 08:00:00

## 2020-03-02 RX ADMIN — ONDANSETRON 4 MG: 2 INJECTION INTRAMUSCULAR; INTRAVENOUS at 08:11:00

## 2020-03-02 RX ADMIN — GLYCOPYRROLATE 0.2 MG: 0.2 INJECTION, SOLUTION INTRAMUSCULAR; INTRAVENOUS at 08:31:00

## 2020-03-02 RX ADMIN — MIDAZOLAM HYDROCHLORIDE 2 MG: 1 INJECTION INTRAMUSCULAR; INTRAVENOUS at 07:51:00

## 2020-03-02 RX ADMIN — DEXAMETHASONE SODIUM PHOSPHATE 4 MG: 4 MG/ML VIAL (ML) INJECTION at 08:11:00

## 2020-03-02 RX ADMIN — NEOSTIGMINE METHYLSULFATE 3 MG: 1 INJECTION INTRAVENOUS at 08:30:00

## 2020-03-02 RX ADMIN — LIDOCAINE HYDROCHLORIDE 50 MG: 10 INJECTION, SOLUTION EPIDURAL; INFILTRATION; INTRACAUDAL; PERINEURAL at 07:57:00

## 2020-03-02 NOTE — ANESTHESIA POSTPROCEDURE EVALUATION
[facility] Patient Status:  Hospital Outpatient Surgery   Age/Gender 51year old female MRN [id number]   [facility] SURGERY Attending Lonnie Woodruff MD   Hosp Day # 0 PCP Melvin Parikh MD       Anesthesia

## 2020-03-02 NOTE — H&P
History & Physical Examination    Patient Name: Lola Lr  MRN: [id number]  [facility]: [id number]  YOB: 1970    Diagnosis: Thrombosed hemorrhoids    Present Illness: The patient is a 71-year-old female s/p bilateral mastectomy.   When she month at Unknown time      lactated ringers infusion, , Intravenous, Continuous  acetaminophen (TYLENOL EXTRA STRENGTH) tab 1,000 mg, 1,000 mg, Oral, Once  cefOXitin Sodium (MEFOXIN) 2 g in sodium chloride 0.9% 100 mL MBP, 2 g, Intravenous, Once  lactated Performed by Margarita Holstein, MD at [facility] ENDOSCOPY   • HYSTEROSCOPY W/DILATION AND CURETTAGE N/A 1/8/2020    Performed by Liliane Almanza DO at [facility]    LEEP after abnormal colposcopy, HPV+   • [facility]

## 2020-03-02 NOTE — ANESTHESIA PROCEDURE NOTES
Airway  Urgency: elective    Airway not difficult    General Information and Staff    Patient location during procedure: OR  Anesthesiologist: Will Donnelly MD  Performed: anesthesiologist     Indications and Patient Condition  Indications for airway manag

## 2020-03-02 NOTE — OPERATIVE REPORT
DATE OF OPERATION:  3/2/2020  PREOPERATIVE DIAGNOSIS: Mixed complex internal/external hemorrhoids x 2 columns. POSTOPERATIVE DIAGNOSIS: Mixed complex internal/external hemorrhoids x 2 columns.    PROCEDURE PERFORMED: Excisional hemorrhoidectomy x 2 column At the 4:00 position, the hemorrhoidal bundle was grasped. The base was infiltrated with local anesthetic, the hemorrhoidal tissue was then excised, and a 2-0 chromic suture was used to reapproximate the mucosa and anoderm.  Marcaine 0.5% with epinephrine

## 2020-03-04 ENCOUNTER — TELEPHONE (OUTPATIENT)
Dept: SURGERY | Facility: CLINIC | Age: 50
End: 2020-03-04

## 2020-03-06 NOTE — PROGRESS NOTES
[facility] Progress Note      Patient Name: French Nelson   YOB: 1970  Medical Record Number: [id number]  Attending Physician: Tom Acharya M.D.      Date of Visit: 3/9/2020      Chief Complaint  Invasive ductal c on 12/01/7347 and was complicated by diarrhea and superficial fungal infection. Cycle 5 was started on 01/57/4798 and was complicated by diarrhea and superficial fungal infection. Cycle 6 was started on 74/77/5492 and was complicated by diarrhea.      On 02 Sodium 150 MCG Oral Tab, Take 300 mcg by mouth before breakfast., Disp: , Rfl:   Levothyroxine Sodium 50 MCG Oral Tab, Take 1 tablet (50 mcg total) by mouth daily. , Disp: 30 tablet, Rfl: 2  Sertraline HCl 100 MG Oral Tab, Take 1 tablet (100 mg total) by mo intact. Psychiatric  Mood and affect appropriate. Laboratory   No results found for this or any previous visit (from the past 24 hour(s)). Pathology  02/20/2020:  A- Right breast, mastectomy:  -Biopsy site changes with chronic inflammation.     -B care physician. 4.   Monitoring of cardiotoxic therapy. Next echocardiogram due prior to start of Kadcyla. Planned Follow Up   Patient will return for treatment in 4 weeks. Risk Level: High - breast cancer on therapy.      Electronically signed b

## 2020-03-09 ENCOUNTER — OFFICE VISIT (OUTPATIENT)
Dept: HEMATOLOGY/ONCOLOGY | Facility: HOSPITAL | Age: 50
End: 2020-03-09
Attending: SPECIALIST
Payer: COMMERCIAL

## 2020-03-09 ENCOUNTER — OFFICE VISIT (OUTPATIENT)
Dept: SURGERY | Facility: CLINIC | Age: 50
End: 2020-03-09
Payer: COMMERCIAL

## 2020-03-09 VITALS
RESPIRATION RATE: 16 BRPM | HEART RATE: 83 BPM | SYSTOLIC BLOOD PRESSURE: 123 MMHG | BODY MASS INDEX: 25.89 KG/M2 | WEIGHT: 163 LBS | DIASTOLIC BLOOD PRESSURE: 77 MMHG | HEIGHT: 66.54 IN

## 2020-03-09 VITALS
HEIGHT: 66.54 IN | HEART RATE: 74 BPM | WEIGHT: 163 LBS | DIASTOLIC BLOOD PRESSURE: 72 MMHG | RESPIRATION RATE: 16 BRPM | BODY MASS INDEX: 25.89 KG/M2 | OXYGEN SATURATION: 97 % | SYSTOLIC BLOOD PRESSURE: 109 MMHG | TEMPERATURE: 97 F

## 2020-03-09 DIAGNOSIS — Z79.899 ENCOUNTER FOR MONITORING CARDIOTOXIC DRUG THERAPY: ICD-10-CM

## 2020-03-09 DIAGNOSIS — E03.9 ACQUIRED HYPOTHYROIDISM: ICD-10-CM

## 2020-03-09 DIAGNOSIS — Z17.0 MALIGNANT NEOPLASM OF UPPER-OUTER QUADRANT OF LEFT BREAST IN FEMALE, ESTROGEN RECEPTOR POSITIVE (HCC): Primary | ICD-10-CM

## 2020-03-09 DIAGNOSIS — C50.412 MALIGNANT NEOPLASM OF UPPER-OUTER QUADRANT OF LEFT BREAST IN FEMALE, ESTROGEN RECEPTOR POSITIVE (HCC): Primary | ICD-10-CM

## 2020-03-09 DIAGNOSIS — F32.89 OTHER DEPRESSION: ICD-10-CM

## 2020-03-09 DIAGNOSIS — K64.8 INTERNAL AND EXTERNAL THROMBOSED HEMORRHOIDS: ICD-10-CM

## 2020-03-09 DIAGNOSIS — Z51.81 ENCOUNTER FOR MONITORING CARDIOTOXIC DRUG THERAPY: ICD-10-CM

## 2020-03-09 DIAGNOSIS — K64.5 INTERNAL AND EXTERNAL THROMBOSED HEMORRHOIDS: ICD-10-CM

## 2020-03-09 PROCEDURE — 1111F DSCHRG MED/CURRENT MED MERGE: CPT | Performed by: SURGERY

## 2020-03-09 PROCEDURE — 99215 OFFICE O/P EST HI 40 MIN: CPT | Performed by: SPECIALIST

## 2020-03-09 PROCEDURE — 96417 CHEMO IV INFUS EACH ADDL SEQ: CPT

## 2020-03-09 PROCEDURE — 96413 CHEMO IV INFUSION 1 HR: CPT

## 2020-03-09 PROCEDURE — 99024 POSTOP FOLLOW-UP VISIT: CPT | Performed by: SURGERY

## 2020-03-09 RX ORDER — SODIUM CHLORIDE 9 MG/ML
INJECTION, SOLUTION INTRAVENOUS ONCE
Status: CANCELLED
Start: 2020-03-09

## 2020-03-09 RX ORDER — SODIUM CHLORIDE 0.9 % (FLUSH) 0.9 %
10 SYRINGE (ML) INJECTION ONCE
Status: CANCELLED | OUTPATIENT
Start: 2020-03-09

## 2020-03-09 RX ORDER — SODIUM CHLORIDE 0.9 % (FLUSH) 0.9 %
10 SYRINGE (ML) INJECTION ONCE
Status: COMPLETED | OUTPATIENT
Start: 2020-03-09 | End: 2020-03-09

## 2020-03-09 RX ADMIN — SODIUM CHLORIDE 0.9 % (FLUSH) 10 ML: 0.9 % SYRINGE (ML) INJECTION at 10:45:00

## 2020-03-09 NOTE — PROGRESS NOTES
Post Operative Visit Note       Active Problems  1. Malignant neoplasm of upper-outer quadrant of left breast in female, estrogen receptor positive ([facility] [address]    2.  Internal and external thrombosed hemorrhoids         Chief Complaint   Patient presents with:  P Performed by Aniceto Bañuelos MD at [facility] ENDOSCOPY   • HEMORRHOIDECTOMY N/A 3/2/2020    Performed by Parish Siu MD at [facility]   • HYSTEROSCOPY W/DILATION AND CURETTAGE N/A 1/8/2020    Performed by Kiet Elizabeth DO at [facility] 2.5 % Rectal Cream, Place 1 Application rectally 2 (two) times daily. Wash and dry rectal area. Massage small amount of cream to affected area.   Use as directed., Disp: 1 Tube, Rfl: 0  •  Loperamide HCl 2 MG Oral Cap, Take 2 mg by mouth 4 (four) times rosario skin change and no tenderness. Left breast exhibits no mass, no skin change and no tenderness. Bilateral incisions healing well with Steri-Strips in place.   Serosanguineous fluid within the KOSTAS bulbs bilaterally   Lymphadenopathy:        Right axillar

## 2020-03-09 NOTE — PROGRESS NOTES
Pt here for C8D1.   Arrives Ambulating independently, accompanied by Self           Patient reports possible pregnancy since last therapy cycle: Yes    Modifications in dose or schedule: No     Frequency of blood return and site check throughout administrat

## 2020-03-16 ENCOUNTER — SOCIAL WORK SERVICES (OUTPATIENT)
Dept: HEMATOLOGY/ONCOLOGY | Facility: HOSPITAL | Age: 50
End: 2020-03-16

## 2020-03-16 NOTE — PROGRESS NOTES
SW completed and faxed Jiahe paperwork and reuquested medical notes to [phone] after speaking with patient to confirm continuous leave date of 4-6-2020.

## 2020-03-17 ENCOUNTER — TELEPHONE (OUTPATIENT)
Dept: HEMATOLOGY/ONCOLOGY | Facility: HOSPITAL | Age: 50
End: 2020-03-17

## 2020-03-17 NOTE — TELEPHONE ENCOUNTER
Cardiac diagnostic called and was looking to speak with a Dr or nurse about the upcoming imaging. They can be reached back at the direct extension is [phone].

## 2020-03-18 ENCOUNTER — HOSPITAL ENCOUNTER (OUTPATIENT)
Dept: CV DIAGNOSTICS | Facility: HOSPITAL | Age: 50
Discharge: HOME | End: 2020-03-18
Attending: SPECIALIST
Payer: COMMERCIAL

## 2020-03-18 ENCOUNTER — MED REC SCAN ONLY (OUTPATIENT)
Dept: SURGERY | Facility: CLINIC | Age: 50
End: 2020-03-18

## 2020-03-18 DIAGNOSIS — C50.412 MALIGNANT NEOPLASM OF UPPER-OUTER QUADRANT OF LEFT BREAST IN FEMALE, ESTROGEN RECEPTOR POSITIVE (HCC): ICD-10-CM

## 2020-03-18 DIAGNOSIS — Z17.0 MALIGNANT NEOPLASM OF UPPER-OUTER QUADRANT OF LEFT BREAST IN FEMALE, ESTROGEN RECEPTOR POSITIVE (HCC): ICD-10-CM

## 2020-03-18 DIAGNOSIS — Z51.81 ENCOUNTER FOR MONITORING CARDIOTOXIC DRUG THERAPY: ICD-10-CM

## 2020-03-18 DIAGNOSIS — Z79.899 ENCOUNTER FOR MONITORING CARDIOTOXIC DRUG THERAPY: ICD-10-CM

## 2020-03-18 PROCEDURE — 93307 TTE W/O DOPPLER COMPLETE: CPT | Performed by: SPECIALIST

## 2020-03-18 PROCEDURE — 93356 MYOCRD STRAIN IMG SPCKL TRCK: CPT | Performed by: SPECIALIST

## 2020-03-23 ENCOUNTER — OFFICE VISIT (OUTPATIENT)
Dept: SURGERY | Facility: CLINIC | Age: 50
End: 2020-03-23

## 2020-03-23 VITALS — TEMPERATURE: 97 F

## 2020-03-23 DIAGNOSIS — K64.5 THROMBOSED HEMORRHOIDS: Primary | ICD-10-CM

## 2020-03-23 DIAGNOSIS — Z17.0 MALIGNANT NEOPLASM OF UPPER-OUTER QUADRANT OF LEFT BREAST IN FEMALE, ESTROGEN RECEPTOR POSITIVE (HCC): ICD-10-CM

## 2020-03-23 DIAGNOSIS — C50.412 MALIGNANT NEOPLASM OF UPPER-OUTER QUADRANT OF LEFT BREAST IN FEMALE, ESTROGEN RECEPTOR POSITIVE (HCC): ICD-10-CM

## 2020-03-23 PROCEDURE — 99024 POSTOP FOLLOW-UP VISIT: CPT | Performed by: SURGERY

## 2020-03-23 NOTE — PROGRESS NOTES
Post Operative Visit Note       Active Problems  1. Thrombosed hemorrhoids    2.  Malignant neoplasm of upper-outer quadrant of left breast in female, estrogen receptor positive [facility])         Chief Complaint   Patient presents with:  Post-Op: p/o 2wk f/u he Jacquie Seay MD at [facility] MAIN OR   • HYSTEROSCOPY W/DILATION AND CURETTAGE N/A 1/8/2020    Performed by Silver Hall DO at [facility]    LEEP after abnormal colposcopy, HPV+   • PORT, INDWELLING, IMP         T 0  •  Loperamide HCl 2 MG Oral Cap, Take 2 mg by mouth 4 (four) times daily as needed for Diarrhea., Disp: , Rfl:   •  Ferrous Sulfate (IRON) 325 (65 FE) MG Oral Tab, Take by mouth daily.   , Disp: , Rfl:       Review of Systems  The Review of Systems has b fissure, tenderness, external hemorrhoid or abnormal anal tone. Genitourinary Comments: Hemorrhoidectomy sites healing well with slight exposed granulation tissue at the 4 o'clock position. There is good anal tone.   There is no erythema, fluctuance,

## 2020-03-24 ENCOUNTER — PATIENT OUTREACH (OUTPATIENT)
Dept: SURGERY | Facility: CLINIC | Age: 50
End: 2020-03-24

## 2020-03-30 ENCOUNTER — OFFICE VISIT (OUTPATIENT)
Dept: HEMATOLOGY/ONCOLOGY | Facility: HOSPITAL | Age: 50
End: 2020-03-30
Attending: SPECIALIST
Payer: COMMERCIAL

## 2020-03-30 ENCOUNTER — HOSPITAL ENCOUNTER (OUTPATIENT)
Dept: GENERAL RADIOLOGY | Facility: HOSPITAL | Age: 50
Discharge: HOME | End: 2020-03-30
Attending: SPECIALIST
Payer: COMMERCIAL

## 2020-03-30 VITALS
RESPIRATION RATE: 16 BRPM | BODY MASS INDEX: 25.89 KG/M2 | OXYGEN SATURATION: 96 % | WEIGHT: 163 LBS | SYSTOLIC BLOOD PRESSURE: 107 MMHG | HEART RATE: 67 BPM | HEIGHT: 66.54 IN | TEMPERATURE: 97 F | DIASTOLIC BLOOD PRESSURE: 69 MMHG

## 2020-03-30 DIAGNOSIS — Z17.0 MALIGNANT NEOPLASM OF UPPER-OUTER QUADRANT OF LEFT BREAST IN FEMALE, ESTROGEN RECEPTOR POSITIVE (HCC): ICD-10-CM

## 2020-03-30 DIAGNOSIS — K21.9 GERD WITHOUT ESOPHAGITIS: ICD-10-CM

## 2020-03-30 DIAGNOSIS — Z17.0 MALIGNANT NEOPLASM OF UPPER-OUTER QUADRANT OF LEFT BREAST IN FEMALE, ESTROGEN RECEPTOR POSITIVE (HCC): Primary | ICD-10-CM

## 2020-03-30 DIAGNOSIS — C50.412 MALIGNANT NEOPLASM OF UPPER-OUTER QUADRANT OF LEFT BREAST IN FEMALE, ESTROGEN RECEPTOR POSITIVE (HCC): ICD-10-CM

## 2020-03-30 DIAGNOSIS — R19.7 DIARRHEA DUE TO MALABSORPTION: ICD-10-CM

## 2020-03-30 DIAGNOSIS — Z79.899 ENCOUNTER FOR MONITORING CARDIOTOXIC DRUG THERAPY: ICD-10-CM

## 2020-03-30 DIAGNOSIS — K90.9 DIARRHEA DUE TO MALABSORPTION: ICD-10-CM

## 2020-03-30 DIAGNOSIS — F32.89 OTHER DEPRESSION: ICD-10-CM

## 2020-03-30 DIAGNOSIS — K90.9 DIARRHEA DUE TO MALABSORPTION: Primary | ICD-10-CM

## 2020-03-30 DIAGNOSIS — C50.412 MALIGNANT NEOPLASM OF UPPER-OUTER QUADRANT OF LEFT BREAST IN FEMALE, ESTROGEN RECEPTOR POSITIVE (HCC): Primary | ICD-10-CM

## 2020-03-30 DIAGNOSIS — E03.9 HYPOTHYROIDISM, UNSPECIFIED TYPE: ICD-10-CM

## 2020-03-30 DIAGNOSIS — E03.9 ACQUIRED HYPOTHYROIDISM: ICD-10-CM

## 2020-03-30 DIAGNOSIS — Z51.81 ENCOUNTER FOR MONITORING CARDIOTOXIC DRUG THERAPY: ICD-10-CM

## 2020-03-30 DIAGNOSIS — R19.7 DIARRHEA DUE TO MALABSORPTION: Primary | ICD-10-CM

## 2020-03-30 LAB
ALBUMIN SERPL-MCNC: 3.9 G/DL (ref 3.4–5)
ALBUMIN/GLOB SERPL: 1 {RATIO} (ref 1–2)
ALP LIVER SERPL-CCNC: 76 U/L (ref 39–100)
ALT SERPL-CCNC: 38 U/L (ref 13–56)
ANION GAP SERPL CALC-SCNC: 7 MMOL/L (ref 0–18)
AST SERPL-CCNC: 25 U/L (ref 15–37)
BASOPHILS # BLD AUTO: 0.02 X10(3) UL (ref 0–0.2)
BASOPHILS NFR BLD AUTO: 0.5 %
BILIRUB SERPL-MCNC: 0.5 MG/DL (ref 0.1–2)
BUN BLD-MCNC: 12 MG/DL (ref 7–18)
BUN/CREAT SERPL: 15 (ref 10–20)
CALCIUM BLD-MCNC: 9 MG/DL (ref 8.5–10.1)
CHLORIDE SERPL-SCNC: 108 MMOL/L (ref 98–112)
CO2 SERPL-SCNC: 26 MMOL/L (ref 21–32)
CREAT BLD-MCNC: 0.8 MG/DL (ref 0.55–1.02)
DEPRECATED RDW RBC AUTO: 40.6 FL (ref 35.1–46.3)
EOSINOPHIL # BLD AUTO: 0.15 X10(3) UL (ref 0–0.7)
EOSINOPHIL NFR BLD AUTO: 3.6 %
ERYTHROCYTE [DISTWIDTH] IN BLOOD BY AUTOMATED COUNT: 13.2 % (ref 11–15)
GLOBULIN PLAS-MCNC: 3.9 G/DL (ref 2.8–4.4)
GLUCOSE BLD-MCNC: 130 MG/DL (ref 70–99)
HCT VFR BLD AUTO: 37.1 % (ref 35–48)
HGB BLD-MCNC: 11.3 G/DL (ref 12–16)
IMM GRANULOCYTES # BLD AUTO: 0 X10(3) UL (ref 0–1)
IMM GRANULOCYTES NFR BLD: 0 %
LYMPHOCYTES # BLD AUTO: 0.91 X10(3) UL (ref 1–4)
LYMPHOCYTES NFR BLD AUTO: 21.6 %
M PROTEIN MFR SERPL ELPH: 7.8 G/DL (ref 6.4–8.2)
MCH RBC QN AUTO: 25.6 PG (ref 26–34)
MCHC RBC AUTO-ENTMCNC: 30.5 G/DL (ref 31–37)
MCV RBC AUTO: 83.9 FL (ref 80–100)
MONOCYTES # BLD AUTO: 0.21 X10(3) UL (ref 0.1–1)
MONOCYTES NFR BLD AUTO: 5 %
NEUTROPHILS # BLD AUTO: 2.93 X10 (3) UL (ref 1.5–7.7)
NEUTROPHILS # BLD AUTO: 2.93 X10(3) UL (ref 1.5–7.7)
NEUTROPHILS NFR BLD AUTO: 69.3 %
OSMOLALITY SERPL CALC.SUM OF ELEC: 294 MOSM/KG (ref 275–295)
PATIENT FASTING Y/N/NP: NO
PLATELET # BLD AUTO: 165 10(3)UL (ref 150–450)
POTASSIUM SERPL-SCNC: 3.7 MMOL/L (ref 3.5–5.1)
RBC # BLD AUTO: 4.42 X10(6)UL (ref 3.8–5.3)
SODIUM SERPL-SCNC: 141 MMOL/L (ref 136–145)
WBC # BLD AUTO: 4.2 X10(3) UL (ref 4–11)

## 2020-03-30 PROCEDURE — 96413 CHEMO IV INFUSION 1 HR: CPT

## 2020-03-30 PROCEDURE — 99215 OFFICE O/P EST HI 40 MIN: CPT | Performed by: SPECIALIST

## 2020-03-30 PROCEDURE — 85025 COMPLETE CBC W/AUTO DIFF WBC: CPT

## 2020-03-30 PROCEDURE — 96375 TX/PRO/DX INJ NEW DRUG ADDON: CPT

## 2020-03-30 PROCEDURE — 74018 RADEX ABDOMEN 1 VIEW: CPT | Performed by: SPECIALIST

## 2020-03-30 PROCEDURE — 80053 COMPREHEN METABOLIC PANEL: CPT

## 2020-03-30 NOTE — PROGRESS NOTES
[facility] Progress Note      Patient Name: Dominic Cramer   YOB: 1970  Medical Record Number: [id number]  Attending Physician: Emerita Marmolejo. Cecilia Carlos M.D.      Date of Visit: 3/30/2020      Chief Complaint  Invasive ductal on 02/81/5274 and was complicated by diarrhea and superficial fungal infection. Cycle 5 was started on 55/57/8334 and was complicated by diarrhea and superficial fungal infection. Cycle 6 was started on 84/62/7268 and was complicated by diarrhea.      On 02 300 mcg by mouth before breakfast., Disp: 180 tablet, Rfl: 1  docusate sodium (COLACE) 100 MG Oral Cap, Take 1 capsule (100 mg total) by mouth 2 (two) times daily. , Disp: 30 capsule, Rfl: 0  Levothyroxine Sodium 50 MCG Oral Tab, Take 1 tablet (50 mcg total distended. Extremities  No lower extremity edema. Integumentary  Skin is warm and dry. Neurologic  Motor and sensory grossly intact. Psychiatric  Mood and affect appropriate.       Laboratory   Recent Results (from the past 24 hour(s))   COMP METABOLIC Impression and Plan   1. Invasive ductal carcinoma, left breast: [id number]. Tumor is estrogen and progesterone receptor positive and Her2/samina positive. Based upon results of the Adin De study, patient started adjuvant Kadcyla on 03/30/2020.  I reviewed

## 2020-03-31 ENCOUNTER — TELEPHONE (OUTPATIENT)
Dept: HEMATOLOGY/ONCOLOGY | Facility: HOSPITAL | Age: 50
End: 2020-03-31

## 2020-03-31 NOTE — TELEPHONE ENCOUNTER
Mee call back post C1D1 tx, has slight nausea last night, did not take anything just rested better today. Reinforced teaching on use of antiemetics, small frequent meals. Instructed to call with any problems or questions.   She verbalizes understand

## 2020-04-06 ENCOUNTER — APPOINTMENT (OUTPATIENT)
Dept: HEMATOLOGY/ONCOLOGY | Facility: HOSPITAL | Age: 50
End: 2020-04-06
Attending: SPECIALIST
Payer: COMMERCIAL

## 2020-04-20 ENCOUNTER — SOCIAL WORK SERVICES (OUTPATIENT)
Dept: HEMATOLOGY/ONCOLOGY | Facility: HOSPITAL | Age: 50
End: 2020-04-20

## 2020-04-20 ENCOUNTER — OFFICE VISIT (OUTPATIENT)
Dept: HEMATOLOGY/ONCOLOGY | Facility: HOSPITAL | Age: 50
End: 2020-04-20
Attending: SPECIALIST
Payer: COMMERCIAL

## 2020-04-20 VITALS
WEIGHT: 159 LBS | SYSTOLIC BLOOD PRESSURE: 112 MMHG | HEIGHT: 66.54 IN | DIASTOLIC BLOOD PRESSURE: 73 MMHG | HEART RATE: 77 BPM | TEMPERATURE: 98 F | RESPIRATION RATE: 16 BRPM | OXYGEN SATURATION: 98 % | BODY MASS INDEX: 25.25 KG/M2

## 2020-04-20 DIAGNOSIS — F32.A DEPRESSION, UNSPECIFIED DEPRESSION TYPE: ICD-10-CM

## 2020-04-20 DIAGNOSIS — C50.412 MALIGNANT NEOPLASM OF UPPER-OUTER QUADRANT OF LEFT BREAST IN FEMALE, ESTROGEN RECEPTOR POSITIVE (HCC): Primary | ICD-10-CM

## 2020-04-20 DIAGNOSIS — E03.9 HYPOTHYROIDISM, UNSPECIFIED TYPE: ICD-10-CM

## 2020-04-20 DIAGNOSIS — R19.7 DIARRHEA, UNSPECIFIED TYPE: ICD-10-CM

## 2020-04-20 DIAGNOSIS — Z17.0 MALIGNANT NEOPLASM OF LEFT BREAST IN FEMALE, ESTROGEN RECEPTOR POSITIVE, UNSPECIFIED SITE OF BREAST (HCC): Primary | ICD-10-CM

## 2020-04-20 DIAGNOSIS — Z17.0 MALIGNANT NEOPLASM OF UPPER-OUTER QUADRANT OF LEFT BREAST IN FEMALE, ESTROGEN RECEPTOR POSITIVE (HCC): Primary | ICD-10-CM

## 2020-04-20 DIAGNOSIS — C50.912 MALIGNANT NEOPLASM OF LEFT BREAST IN FEMALE, ESTROGEN RECEPTOR POSITIVE, UNSPECIFIED SITE OF BREAST (HCC): Primary | ICD-10-CM

## 2020-04-20 PROCEDURE — 96413 CHEMO IV INFUSION 1 HR: CPT

## 2020-04-20 PROCEDURE — 99215 OFFICE O/P EST HI 40 MIN: CPT | Performed by: INTERNAL MEDICINE

## 2020-04-20 PROCEDURE — 96375 TX/PRO/DX INJ NEW DRUG ADDON: CPT

## 2020-04-20 PROCEDURE — 85025 COMPLETE CBC W/AUTO DIFF WBC: CPT

## 2020-04-20 PROCEDURE — 80053 COMPREHEN METABOLIC PANEL: CPT

## 2020-04-20 RX ORDER — TAMOXIFEN CITRATE 20 MG/1
20 TABLET ORAL DAILY
Qty: 90 TABLET | Refills: 3 | Status: SHIPPED | OUTPATIENT
Start: 2020-04-20 | End: 2021-04-26

## 2020-04-20 NOTE — PROGRESS NOTES
[facility] Progress Note      Patient Name: James Weaver   YOB: 1970  Medical Record Number: [id number]  Attending Physician: Artur Chong. Davey Thompson M.D.      Date of Visit: 4/20/2020    Chief Complaint  Invasive ductal ca on 06/94/0406 and was complicated by diarrhea and superficial fungal infection. Cycle 5 was started on 13/21/8159 and was complicated by diarrhea and superficial fungal infection. Cycle 6 was started on 53/40/6432 and was complicated by diarrhea.      On 02 (300 mcg total) by mouth before breakfast. Take 300 mcg by mouth before breakfast., Disp: 180 tablet, Rfl: 1  docusate sodium (COLACE) 100 MG Oral Cap, Take 1 capsule (100 mg total) by mouth 2 (two) times daily. , Disp: 30 capsule, Rfl: 0  Sertraline HCl 10 and affect appropriate. Appears close to chronological age. Well nourished. Well developed. Eyes Normal - Conjunctivae and sclerae are clear and without icterus. Pupils are reactive and equal.   Hematologic/Lymphatic Normal - No petechiae or purpura.   No Impression and Plan   1. Invasive ductal carcinoma, left breast: [id number]. Tumor is estrogen and progesterone receptor positive and Her2/samina positive. Based upon results of the Dyane Crystal study, patient started adjuvant Kadcyla on 03/30/2020.  She is jayce

## 2020-04-20 NOTE — PROGRESS NOTES
Sanjeev met with patient to offer support. Patient reports that she is starting a new chemotherapy she will receive through the end of the year. She is nervous about new side effects, but is positive about treatment.  Patient reports that she is working from [employer]

## 2020-05-08 NOTE — PROGRESS NOTES
[facility] Progress Note      Patient Name: Darius Paulino   YOB: 1970  Medical Record Number: [id number]  Attending Physician: Alexandria Richardson. Brock Vergara M.D.      Date of Visit: 5/11/2020      Chief Complaint  Invasive ductal on 80/03/5453 and was complicated by diarrhea and superficial fungal infection. Cycle 5 was started on 52/27/2795 and was complicated by diarrhea and superficial fungal infection. Cycle 6 was started on 81/59/8453 and was complicated by diarrhea.      On 02 daily., Disp: 90 tablet, Rfl: 3  Tamoxifen Citrate 20 MG Oral Tab, Take 1 tablet (20 mg total) by mouth daily. , Disp: 90 tablet, Rfl: 3  Levothyroxine Sodium 50 MCG Oral Tab, Take 1 tablet (50 mcg total) by mouth daily. , Disp: 90 tablet, Rfl: 0  Levothyrox normal; external ears normal.  Neck   No JVD. Hematologic  No petechiae or purpura. Chest   Bilateral mastectomy without reconstruction; no chest wall masses. Respiratory  Normal effort; no respiratory distress; clear to auscultation bilaterally.   Asim Hatchet 0.10 - 1.00 x10(3) uL    Eosinophil Absolute 0.07 0.00 - 0.70 x10(3) uL    Basophil Absolute 0.02 0.00 - 0.20 x10(3) uL    Immature Granulocyte Absolute 0.01 0.00 - 1.00 x10(3) uL    Neutrophil % 61.8 %    Lymphocyte % 28.0 %    Monocyte % 7.9 %    Eosinop

## 2020-05-11 ENCOUNTER — OFFICE VISIT (OUTPATIENT)
Dept: HEMATOLOGY/ONCOLOGY | Facility: HOSPITAL | Age: 50
End: 2020-05-11
Attending: SPECIALIST
Payer: COMMERCIAL

## 2020-05-11 VITALS
OXYGEN SATURATION: 97 % | TEMPERATURE: 98 F | BODY MASS INDEX: 25.73 KG/M2 | HEIGHT: 66.54 IN | SYSTOLIC BLOOD PRESSURE: 113 MMHG | RESPIRATION RATE: 16 BRPM | HEART RATE: 80 BPM | WEIGHT: 162 LBS | DIASTOLIC BLOOD PRESSURE: 67 MMHG

## 2020-05-11 DIAGNOSIS — Z17.0 MALIGNANT NEOPLASM OF LEFT BREAST IN FEMALE, ESTROGEN RECEPTOR POSITIVE, UNSPECIFIED SITE OF BREAST (HCC): ICD-10-CM

## 2020-05-11 DIAGNOSIS — K64.8 INTERNAL HEMORRHOIDS: ICD-10-CM

## 2020-05-11 DIAGNOSIS — Z17.0 MALIGNANT NEOPLASM OF UPPER-OUTER QUADRANT OF LEFT BREAST IN FEMALE, ESTROGEN RECEPTOR POSITIVE (HCC): Primary | ICD-10-CM

## 2020-05-11 DIAGNOSIS — Z79.899 ENCOUNTER FOR MONITORING CARDIOTOXIC DRUG THERAPY: Primary | ICD-10-CM

## 2020-05-11 DIAGNOSIS — Z17.0 MALIGNANT NEOPLASM OF UPPER-OUTER QUADRANT OF LEFT BREAST IN FEMALE, ESTROGEN RECEPTOR POSITIVE (HCC): ICD-10-CM

## 2020-05-11 DIAGNOSIS — F32.A DEPRESSION, UNSPECIFIED DEPRESSION TYPE: ICD-10-CM

## 2020-05-11 DIAGNOSIS — R19.7 DIARRHEA, UNSPECIFIED TYPE: ICD-10-CM

## 2020-05-11 DIAGNOSIS — Z51.81 ENCOUNTER FOR MONITORING CARDIOTOXIC DRUG THERAPY: Primary | ICD-10-CM

## 2020-05-11 DIAGNOSIS — E03.9 ACQUIRED HYPOTHYROIDISM: ICD-10-CM

## 2020-05-11 DIAGNOSIS — C50.412 MALIGNANT NEOPLASM OF UPPER-OUTER QUADRANT OF LEFT BREAST IN FEMALE, ESTROGEN RECEPTOR POSITIVE (HCC): ICD-10-CM

## 2020-05-11 DIAGNOSIS — C50.912 MALIGNANT NEOPLASM OF LEFT BREAST IN FEMALE, ESTROGEN RECEPTOR POSITIVE, UNSPECIFIED SITE OF BREAST (HCC): ICD-10-CM

## 2020-05-11 DIAGNOSIS — E03.9 HYPOTHYROIDISM, UNSPECIFIED TYPE: ICD-10-CM

## 2020-05-11 DIAGNOSIS — C50.412 MALIGNANT NEOPLASM OF UPPER-OUTER QUADRANT OF LEFT BREAST IN FEMALE, ESTROGEN RECEPTOR POSITIVE (HCC): Primary | ICD-10-CM

## 2020-05-11 PROCEDURE — 96413 CHEMO IV INFUSION 1 HR: CPT

## 2020-05-11 PROCEDURE — 99215 OFFICE O/P EST HI 40 MIN: CPT | Performed by: SPECIALIST

## 2020-05-11 PROCEDURE — 80053 COMPREHEN METABOLIC PANEL: CPT

## 2020-05-11 PROCEDURE — 85025 COMPLETE CBC W/AUTO DIFF WBC: CPT

## 2020-05-11 PROCEDURE — 96375 TX/PRO/DX INJ NEW DRUG ADDON: CPT

## 2020-05-11 RX ORDER — TAMOXIFEN CITRATE 20 MG/1
20 TABLET ORAL DAILY
Qty: 90 TABLET | Refills: 3 | Status: SHIPPED | OUTPATIENT
Start: 2020-05-11 | End: 2020-06-01

## 2020-05-11 RX ORDER — SODIUM CHLORIDE 0.9 % (FLUSH) 0.9 %
10 SYRINGE (ML) INJECTION ONCE
Status: CANCELLED | OUTPATIENT
Start: 2020-05-11

## 2020-05-11 RX ORDER — SODIUM CHLORIDE 9 MG/ML
INJECTION, SOLUTION INTRAVENOUS ONCE
Status: CANCELLED
Start: 2020-05-11

## 2020-05-11 RX ORDER — SODIUM CHLORIDE 0.9 % (FLUSH) 0.9 %
10 SYRINGE (ML) INJECTION ONCE
Status: COMPLETED | OUTPATIENT
Start: 2020-05-11 | End: 2020-05-11

## 2020-05-11 RX ADMIN — SODIUM CHLORIDE 0.9 % (FLUSH) 10 ML: 0.9 % SYRINGE (ML) INJECTION at 12:07:00

## 2020-05-11 NOTE — PROGRESS NOTES
Pt here for C4.   Arrives Ambulating independently, accompanied by Self           Patient reports possible pregnancy since last therapy cycle: No    Modifications in dose or schedule: No     Frequency of blood return and site check throughout administration

## 2020-05-25 NOTE — PROGRESS NOTES
[facility]ematology Oncology Group Progress Note      Patient Name: Lesly Pineda   YOB: 1970  Medical Record Number: [id number]  Attending Physician: Rodolfo Epstein. Margo Feliz M.D.      Date of Visit: 6/1/2020      Chief Complaint  Invasive ductal c on 35/22/3398 and was complicated by diarrhea and superficial fungal infection. Cycle 5 was started on 34/14/5036 and was complicated by diarrhea and superficial fungal infection. Cycle 6 was started on 15/34/3576 and was complicated by diarrhea.      On 02 tablet (20 mg total) by mouth daily. , Disp: 90 tablet, Rfl: 3  Levothyroxine Sodium 50 MCG Oral Tab, Take 1 tablet (50 mcg total) by mouth daily. , Disp: 90 tablet, Rfl: 0  Levothyroxine Sodium 150 MCG Oral Tab, Take 2 tablets (300 mcg total) by mouth befor intact. Psychiatric  Mood and affect appropriate.       Laboratory   Recent Results (from the past 24 hour(s))   CBC W/ DIFFERENTIAL    Collection Time: 06/01/20  9:00 AM   Result Value Ref Range    WBC 4.2 4.0 - 11.0 x10(3) uL    RBC 4.83 3.80 - 5.30 x10( undergo repeat colonoscopy in 3 years. Planned Follow Up   Patient will return for treatment in 3 weeks. Risk Level: High - breast cancer on therapy. Electronically signed by:    Shayan Lebron M.D.   Associate Medical Director of Oncology Ser

## 2020-06-01 ENCOUNTER — OFFICE VISIT (OUTPATIENT)
Dept: HEMATOLOGY/ONCOLOGY | Facility: HOSPITAL | Age: 50
End: 2020-06-01
Attending: SPECIALIST
Payer: COMMERCIAL

## 2020-06-01 VITALS
RESPIRATION RATE: 18 BRPM | HEART RATE: 80 BPM | WEIGHT: 162 LBS | BODY MASS INDEX: 25.73 KG/M2 | HEIGHT: 66.54 IN | TEMPERATURE: 98 F | DIASTOLIC BLOOD PRESSURE: 68 MMHG | OXYGEN SATURATION: 96 % | SYSTOLIC BLOOD PRESSURE: 105 MMHG

## 2020-06-01 DIAGNOSIS — Z79.899 ENCOUNTER FOR MONITORING CARDIOTOXIC DRUG THERAPY: ICD-10-CM

## 2020-06-01 DIAGNOSIS — Z17.0 MALIGNANT NEOPLASM OF LEFT BREAST IN FEMALE, ESTROGEN RECEPTOR POSITIVE, UNSPECIFIED SITE OF BREAST (HCC): ICD-10-CM

## 2020-06-01 DIAGNOSIS — C50.412 MALIGNANT NEOPLASM OF UPPER-OUTER QUADRANT OF LEFT BREAST IN FEMALE, ESTROGEN RECEPTOR POSITIVE (HCC): ICD-10-CM

## 2020-06-01 DIAGNOSIS — E03.9 ACQUIRED HYPOTHYROIDISM: ICD-10-CM

## 2020-06-01 DIAGNOSIS — Z51.81 ENCOUNTER FOR MONITORING CARDIOTOXIC DRUG THERAPY: ICD-10-CM

## 2020-06-01 DIAGNOSIS — C50.912 MALIGNANT NEOPLASM OF LEFT BREAST IN FEMALE, ESTROGEN RECEPTOR POSITIVE, UNSPECIFIED SITE OF BREAST (HCC): ICD-10-CM

## 2020-06-01 DIAGNOSIS — D50.9 MICROCYTIC ANEMIA: ICD-10-CM

## 2020-06-01 DIAGNOSIS — Z17.0 MALIGNANT NEOPLASM OF UPPER-OUTER QUADRANT OF LEFT BREAST IN FEMALE, ESTROGEN RECEPTOR POSITIVE (HCC): ICD-10-CM

## 2020-06-01 DIAGNOSIS — D50.9 MICROCYTIC ANEMIA: Primary | ICD-10-CM

## 2020-06-01 DIAGNOSIS — C50.412 MALIGNANT NEOPLASM OF UPPER-OUTER QUADRANT OF LEFT BREAST IN FEMALE, ESTROGEN RECEPTOR POSITIVE (HCC): Primary | ICD-10-CM

## 2020-06-01 DIAGNOSIS — Z17.0 MALIGNANT NEOPLASM OF UPPER-OUTER QUADRANT OF LEFT BREAST IN FEMALE, ESTROGEN RECEPTOR POSITIVE (HCC): Primary | ICD-10-CM

## 2020-06-01 PROCEDURE — 80053 COMPREHEN METABOLIC PANEL: CPT

## 2020-06-01 PROCEDURE — 82728 ASSAY OF FERRITIN: CPT

## 2020-06-01 PROCEDURE — 96409 CHEMO IV PUSH SNGL DRUG: CPT

## 2020-06-01 PROCEDURE — 85025 COMPLETE CBC W/AUTO DIFF WBC: CPT

## 2020-06-01 PROCEDURE — 96375 TX/PRO/DX INJ NEW DRUG ADDON: CPT

## 2020-06-01 PROCEDURE — 99215 OFFICE O/P EST HI 40 MIN: CPT | Performed by: SPECIALIST

## 2020-06-01 PROCEDURE — 83540 ASSAY OF IRON: CPT

## 2020-06-01 PROCEDURE — 83550 IRON BINDING TEST: CPT

## 2020-06-01 NOTE — PROGRESS NOTES
Pt here for C4D1.   Arrives Ambulating independently, accompanied by Self           Patient reports possible pregnancy since last therapy cycle: No    Modifications in dose or schedule: No     Frequency of blood return and site check throughout administrati

## 2020-06-16 ENCOUNTER — MED REC SCAN ONLY (OUTPATIENT)
Dept: SURGERY | Facility: CLINIC | Age: 50
End: 2020-06-16

## 2020-06-19 ENCOUNTER — HOSPITAL ENCOUNTER (OUTPATIENT)
Dept: CV DIAGNOSTICS | Facility: HOSPITAL | Age: 50
Discharge: HOME | End: 2020-06-19
Attending: SPECIALIST
Payer: COMMERCIAL

## 2020-06-19 DIAGNOSIS — Z79.899 ENCOUNTER FOR MONITORING CARDIOTOXIC DRUG THERAPY: ICD-10-CM

## 2020-06-19 DIAGNOSIS — Z51.81 ENCOUNTER FOR MONITORING CARDIOTOXIC DRUG THERAPY: ICD-10-CM

## 2020-06-19 PROCEDURE — 93356 MYOCRD STRAIN IMG SPCKL TRCK: CPT | Performed by: SPECIALIST

## 2020-06-19 PROCEDURE — 93307 TTE W/O DOPPLER COMPLETE: CPT | Performed by: SPECIALIST

## 2020-06-20 PROBLEM — D50.8 IRON DEFICIENCY ANEMIA REFRACTORY TO IRON THERAPY: Status: ACTIVE | Noted: 2020-06-20

## 2020-06-20 PROBLEM — D50.0 IRON DEFICIENCY ANEMIA SECONDARY TO BLOOD LOSS (CHRONIC): Status: ACTIVE | Noted: 2020-06-20

## 2020-06-21 NOTE — PROGRESS NOTES
THE [facility]ematology Oncology Group Progress Note      Patient Name: Radha Silver   YOB: 1970  Medical Record Number: [id number]  Attending Physician: Marsha Calderon. Kristin Hurt M.D.      Date of Visit: 6/22/2020      Chief Complaint  Invasive ductal on 73/27/3858 and was complicated by diarrhea and superficial fungal infection. Cycle 5 was started on 35/79/0964 and was complicated by diarrhea and superficial fungal infection. Cycle 6 was started on 83/70/6578 and was complicated by diarrhea.      On 02 by physician)  Denies tobacco use; social alcohol use. Current Medications   Tamoxifen Citrate 20 MG Oral Tab, Take 1 tablet (20 mg total) by mouth daily. , Disp: 90 tablet, Rfl: 3  Levothyroxine Sodium 50 MCG Oral Tab, Take 1 tablet (50 mcg total) by adenopathy. Chest   Bilateral mastectomy without reconstruction. Respiratory  Normal effort; no respiratory distress; clear to auscultation bilaterally. Abdomen  Soft; nontender; no masses; no hepatosplenomegaly. Extremities  No lower extremity edema. Immature Granulocyte Absolute 0.02 0.00 - 1.00 x10(3) uL    Neutrophil % 64.2 %    Lymphocyte % 27.1 %    Monocyte % 6.0 %    Eosinophil % 1.7 %    Basophil % 0.6 %    Immature Granulocyte % 0.4 %        Impression and Plan   1.    Invasive ductal carcinoma

## 2020-06-22 ENCOUNTER — OFFICE VISIT (OUTPATIENT)
Dept: HEMATOLOGY/ONCOLOGY | Facility: HOSPITAL | Age: 50
End: 2020-06-22
Attending: SPECIALIST
Payer: COMMERCIAL

## 2020-06-22 VITALS
OXYGEN SATURATION: 95 % | DIASTOLIC BLOOD PRESSURE: 63 MMHG | SYSTOLIC BLOOD PRESSURE: 108 MMHG | RESPIRATION RATE: 16 BRPM | BODY MASS INDEX: 25.83 KG/M2 | HEIGHT: 66.54 IN | TEMPERATURE: 99 F | HEART RATE: 75 BPM | WEIGHT: 162.63 LBS

## 2020-06-22 DIAGNOSIS — C50.412 MALIGNANT NEOPLASM OF UPPER-OUTER QUADRANT OF LEFT BREAST IN FEMALE, ESTROGEN RECEPTOR POSITIVE (HCC): Primary | ICD-10-CM

## 2020-06-22 DIAGNOSIS — D50.8 IRON DEFICIENCY ANEMIA REFRACTORY TO IRON THERAPY: ICD-10-CM

## 2020-06-22 DIAGNOSIS — D50.9 MICROCYTIC ANEMIA: ICD-10-CM

## 2020-06-22 DIAGNOSIS — Z79.899 ENCOUNTER FOR MONITORING CARDIOTOXIC DRUG THERAPY: ICD-10-CM

## 2020-06-22 DIAGNOSIS — F32.A DEPRESSION, UNSPECIFIED DEPRESSION TYPE: ICD-10-CM

## 2020-06-22 DIAGNOSIS — D50.0 IRON DEFICIENCY ANEMIA SECONDARY TO BLOOD LOSS (CHRONIC): ICD-10-CM

## 2020-06-22 DIAGNOSIS — E03.9 ACQUIRED HYPOTHYROIDISM: ICD-10-CM

## 2020-06-22 DIAGNOSIS — Z17.0 MALIGNANT NEOPLASM OF UPPER-OUTER QUADRANT OF LEFT BREAST IN FEMALE, ESTROGEN RECEPTOR POSITIVE (HCC): Primary | ICD-10-CM

## 2020-06-22 DIAGNOSIS — C50.912 MALIGNANT NEOPLASM OF LEFT BREAST IN FEMALE, ESTROGEN RECEPTOR POSITIVE, UNSPECIFIED SITE OF BREAST (HCC): ICD-10-CM

## 2020-06-22 DIAGNOSIS — Z17.0 MALIGNANT NEOPLASM OF LEFT BREAST IN FEMALE, ESTROGEN RECEPTOR POSITIVE, UNSPECIFIED SITE OF BREAST (HCC): ICD-10-CM

## 2020-06-22 DIAGNOSIS — E03.9 HYPOTHYROIDISM, UNSPECIFIED TYPE: ICD-10-CM

## 2020-06-22 DIAGNOSIS — Z51.81 ENCOUNTER FOR MONITORING CARDIOTOXIC DRUG THERAPY: ICD-10-CM

## 2020-06-22 PROCEDURE — 85025 COMPLETE CBC W/AUTO DIFF WBC: CPT

## 2020-06-22 PROCEDURE — 96375 TX/PRO/DX INJ NEW DRUG ADDON: CPT

## 2020-06-22 PROCEDURE — 99215 OFFICE O/P EST HI 40 MIN: CPT | Performed by: SPECIALIST

## 2020-06-22 PROCEDURE — 80053 COMPREHEN METABOLIC PANEL: CPT

## 2020-06-22 PROCEDURE — 96413 CHEMO IV INFUSION 1 HR: CPT

## 2020-06-22 NOTE — PROGRESS NOTES
Pt here for C5D1.   Arrives Ambulating independently, accompanied by Self           Patient reports possible pregnancy since last therapy cycle: No    Modifications in dose or schedule: No     Frequency of blood return and site check throughout administrati

## 2020-06-29 NOTE — PROGRESS NOTES
[facility] Progress Note      Patient Name: Ricardo Rivas   YOB: 1970  Medical Record Number: [id number]  Attending Physician: Melissa Wall. Ever Diez M.D.      Date of Visit: 7/13/2020      Chief Complaint  Invasive ductal on 42/35/7201 and was complicated by diarrhea and superficial fungal infection. Cycle 5 was started on 74/09/8021 and was complicated by diarrhea and superficial fungal infection. Cycle 6 was started on 33/26/2108 and was complicated by diarrhea.      On 02 tablet (20 mg total) by mouth daily. , Disp: 90 tablet, Rfl: 3  Levothyroxine Sodium 50 MCG Oral Tab, Take 1 tablet (50 mcg total) by mouth daily. , Disp: 90 tablet, Rfl: 0  Levothyroxine Sodium 150 MCG Oral Tab, Take 2 tablets (300 mcg total) by mouth befor bilaterally. Abdomen  No distended. Extremities  No lower extremity edema. Integumentary  No rashes. Neurologic  Motor and sensory grossly intact. Psychiatric  Mood and affect appropriate.       Laboratory   Recent Results (from the past 24 hour(s)) Granulocyte % 0.2 %      Cardiology  2020:  Transthoracic Echocardiogram     Name:Oscar Ruiz Primer     Date: 2020 :  1970 Ht:  (66in)  BP: 110 / 60  MRN:  [id number]    Age:  49years    Wt:  (165lb) HR: 70bpm  Loc:  [facility]        Gndr: F ejection  fraction was 60-65%. There was no diagnostic evidence for regional wall  motion abnormalities. Left atrium:  The left atrium was normal in size. Right ventricle:   The cavity size was normal. Systolic function was normal.  Right atrium:  The atr 49    ml     22 - 52   LA volume/bsa, ES, 1-p A4C              26    ml/m^2 ---------   LA volume, ES, 1-p A2C          (H)     80    ml     22 - 52   LA volume/bsa, ES, 1-p A2C              43    ml/m^2 ---------   LA ID, A-P, ES, MM

## 2020-07-13 ENCOUNTER — OFFICE VISIT (OUTPATIENT)
Dept: HEMATOLOGY/ONCOLOGY | Facility: HOSPITAL | Age: 50
End: 2020-07-13
Attending: SPECIALIST
Payer: COMMERCIAL

## 2020-07-13 VITALS
HEART RATE: 74 BPM | WEIGHT: 160.63 LBS | BODY MASS INDEX: 25.51 KG/M2 | DIASTOLIC BLOOD PRESSURE: 69 MMHG | RESPIRATION RATE: 16 BRPM | SYSTOLIC BLOOD PRESSURE: 104 MMHG | TEMPERATURE: 98 F | HEIGHT: 66.54 IN

## 2020-07-13 DIAGNOSIS — C50.912 MALIGNANT NEOPLASM OF LEFT BREAST IN FEMALE, ESTROGEN RECEPTOR POSITIVE, UNSPECIFIED SITE OF BREAST (HCC): ICD-10-CM

## 2020-07-13 DIAGNOSIS — Z17.0 MALIGNANT NEOPLASM OF UPPER-OUTER QUADRANT OF LEFT BREAST IN FEMALE, ESTROGEN RECEPTOR POSITIVE (HCC): Primary | ICD-10-CM

## 2020-07-13 DIAGNOSIS — D50.0 IRON DEFICIENCY ANEMIA SECONDARY TO BLOOD LOSS (CHRONIC): ICD-10-CM

## 2020-07-13 DIAGNOSIS — Z79.899 ENCOUNTER FOR MONITORING CARDIOTOXIC DRUG THERAPY: ICD-10-CM

## 2020-07-13 DIAGNOSIS — E03.9 HYPOTHYROIDISM, UNSPECIFIED TYPE: ICD-10-CM

## 2020-07-13 DIAGNOSIS — E03.9 ACQUIRED HYPOTHYROIDISM: ICD-10-CM

## 2020-07-13 DIAGNOSIS — D50.8 IRON DEFICIENCY ANEMIA REFRACTORY TO IRON THERAPY: ICD-10-CM

## 2020-07-13 DIAGNOSIS — F32.A DEPRESSION, UNSPECIFIED DEPRESSION TYPE: ICD-10-CM

## 2020-07-13 DIAGNOSIS — Z51.81 ENCOUNTER FOR MONITORING CARDIOTOXIC DRUG THERAPY: ICD-10-CM

## 2020-07-13 DIAGNOSIS — Z17.0 MALIGNANT NEOPLASM OF LEFT BREAST IN FEMALE, ESTROGEN RECEPTOR POSITIVE, UNSPECIFIED SITE OF BREAST (HCC): ICD-10-CM

## 2020-07-13 DIAGNOSIS — C50.412 MALIGNANT NEOPLASM OF UPPER-OUTER QUADRANT OF LEFT BREAST IN FEMALE, ESTROGEN RECEPTOR POSITIVE (HCC): Primary | ICD-10-CM

## 2020-07-13 LAB
ALBUMIN SERPL-MCNC: 3.7 G/DL (ref 3.4–5)
ALBUMIN/GLOB SERPL: 0.9 {RATIO} (ref 1–2)
ALP LIVER SERPL-CCNC: 59 U/L (ref 39–100)
ALT SERPL-CCNC: 37 U/L (ref 13–56)
ANION GAP SERPL CALC-SCNC: <0 MMOL/L (ref 0–18)
AST SERPL-CCNC: 29 U/L (ref 15–37)
BASOPHILS # BLD AUTO: 0.03 X10(3) UL (ref 0–0.2)
BASOPHILS NFR BLD AUTO: 0.7 %
BILIRUB SERPL-MCNC: 0.4 MG/DL (ref 0.1–2)
BUN BLD-MCNC: 10 MG/DL (ref 7–18)
BUN/CREAT SERPL: 14.9 (ref 10–20)
CALCIUM BLD-MCNC: 9.1 MG/DL (ref 8.5–10.1)
CHLORIDE SERPL-SCNC: 107 MMOL/L (ref 98–112)
CO2 SERPL-SCNC: 31 MMOL/L (ref 21–32)
CREAT BLD-MCNC: 0.67 MG/DL (ref 0.55–1.02)
DEPRECATED RDW RBC AUTO: 51.5 FL (ref 35.1–46.3)
EOSINOPHIL # BLD AUTO: 0.06 X10(3) UL (ref 0–0.7)
EOSINOPHIL NFR BLD AUTO: 1.3 %
ERYTHROCYTE [DISTWIDTH] IN BLOOD BY AUTOMATED COUNT: 18.9 % (ref 11–15)
GLOBULIN PLAS-MCNC: 4 G/DL (ref 2.8–4.4)
GLUCOSE BLD-MCNC: 95 MG/DL (ref 70–99)
HCT VFR BLD AUTO: 37.4 % (ref 35–48)
HGB BLD-MCNC: 11.6 G/DL (ref 12–16)
IMM GRANULOCYTES # BLD AUTO: 0.01 X10(3) UL (ref 0–1)
IMM GRANULOCYTES NFR BLD: 0.2 %
LYMPHOCYTES # BLD AUTO: 1.24 X10(3) UL (ref 1–4)
LYMPHOCYTES NFR BLD AUTO: 27.7 %
M PROTEIN MFR SERPL ELPH: 7.7 G/DL (ref 6.4–8.2)
MCH RBC QN AUTO: 23.5 PG (ref 26–34)
MCHC RBC AUTO-ENTMCNC: 31 G/DL (ref 31–37)
MCV RBC AUTO: 75.9 FL (ref 80–100)
MONOCYTES # BLD AUTO: 0.34 X10(3) UL (ref 0.1–1)
MONOCYTES NFR BLD AUTO: 7.6 %
NEUTROPHILS # BLD AUTO: 2.79 X10 (3) UL (ref 1.5–7.7)
NEUTROPHILS # BLD AUTO: 2.79 X10(3) UL (ref 1.5–7.7)
NEUTROPHILS NFR BLD AUTO: 62.5 %
OSMOLALITY SERPL CALC.SUM OF ELEC: 283 MOSM/KG (ref 275–295)
PATIENT FASTING Y/N/NP: NO
PLATELET # BLD AUTO: 176 10(3)UL (ref 150–450)
POTASSIUM SERPL-SCNC: 3.8 MMOL/L (ref 3.5–5.1)
RBC # BLD AUTO: 4.93 X10(6)UL (ref 3.8–5.3)
SODIUM SERPL-SCNC: 137 MMOL/L (ref 136–145)
WBC # BLD AUTO: 4.5 X10(3) UL (ref 4–11)

## 2020-07-13 PROCEDURE — 85025 COMPLETE CBC W/AUTO DIFF WBC: CPT

## 2020-07-13 PROCEDURE — 96375 TX/PRO/DX INJ NEW DRUG ADDON: CPT

## 2020-07-13 PROCEDURE — 96413 CHEMO IV INFUSION 1 HR: CPT

## 2020-07-13 PROCEDURE — 99215 OFFICE O/P EST HI 40 MIN: CPT | Performed by: SPECIALIST

## 2020-07-13 PROCEDURE — 80053 COMPREHEN METABOLIC PANEL: CPT

## 2020-07-27 NOTE — PROGRESS NOTES
THE [facility]ology Oncology Group Progress Note      Patient Name: Isela Hernandez   YOB: 1970  Medical Record Number: [id number]  Attending Physician: Delores Dorman. Chalino Lovell M.D.      Date of Visit: 8/3/2020      Chief Complaint  Invasive ductal c on 64/46/8594 and was complicated by diarrhea and superficial fungal infection. Cycle 5 was started on 26/18/9455 and was complicated by diarrhea and superficial fungal infection. Cycle 6 was started on 83/00/8403 and was complicated by diarrhea.      On 02 2 tablets (300 mcg total) by mouth before breakfast. Take 300 mcg by mouth before breakfast., Disp: 180 tablet, Rfl: 0  Levothyroxine Sodium 50 MCG Oral Tab, Take 1/2 tablet (25 mcg)by mouth before breakfast  in addition to 300 mcg (total 325 mcg), Disp: 9 to auscultation bilaterally. Abdomen  No distended. Extremities  No lower extremity edema. Integumentary  No rashes. Neurologic  Motor and sensory grossly intact. Psychiatric  Mood and affect appropriate.       Laboratory   Recent Results (from the pa Immature Granulocyte % 0.2 %        Impression and Plan   1. Invasive ductal carcinoma, left breast: [id number]. Laboratory studies ordered today. Tumor is estrogen and progesterone receptor positive and Her2/samina positive.  Based upon results of the [facility]

## 2020-08-03 ENCOUNTER — OFFICE VISIT (OUTPATIENT)
Dept: HEMATOLOGY/ONCOLOGY | Facility: HOSPITAL | Age: 50
End: 2020-08-03
Attending: SPECIALIST
Payer: COMMERCIAL

## 2020-08-03 VITALS
BODY MASS INDEX: 25.57 KG/M2 | RESPIRATION RATE: 16 BRPM | OXYGEN SATURATION: 96 % | HEART RATE: 79 BPM | DIASTOLIC BLOOD PRESSURE: 75 MMHG | TEMPERATURE: 98 F | WEIGHT: 161 LBS | HEIGHT: 66.54 IN | SYSTOLIC BLOOD PRESSURE: 123 MMHG

## 2020-08-03 DIAGNOSIS — Z17.0 MALIGNANT NEOPLASM OF UPPER-OUTER QUADRANT OF LEFT BREAST IN FEMALE, ESTROGEN RECEPTOR POSITIVE (HCC): Primary | ICD-10-CM

## 2020-08-03 DIAGNOSIS — Z17.0 MALIGNANT NEOPLASM OF LEFT BREAST IN FEMALE, ESTROGEN RECEPTOR POSITIVE, UNSPECIFIED SITE OF BREAST (HCC): ICD-10-CM

## 2020-08-03 DIAGNOSIS — Z79.899 ENCOUNTER FOR MONITORING CARDIOTOXIC DRUG THERAPY: ICD-10-CM

## 2020-08-03 DIAGNOSIS — D50.0 IRON DEFICIENCY ANEMIA SECONDARY TO BLOOD LOSS (CHRONIC): ICD-10-CM

## 2020-08-03 DIAGNOSIS — E03.9 ACQUIRED HYPOTHYROIDISM: ICD-10-CM

## 2020-08-03 DIAGNOSIS — C50.912 MALIGNANT NEOPLASM OF LEFT BREAST IN FEMALE, ESTROGEN RECEPTOR POSITIVE, UNSPECIFIED SITE OF BREAST (HCC): ICD-10-CM

## 2020-08-03 DIAGNOSIS — D50.8 IRON DEFICIENCY ANEMIA REFRACTORY TO IRON THERAPY: ICD-10-CM

## 2020-08-03 DIAGNOSIS — C50.412 MALIGNANT NEOPLASM OF UPPER-OUTER QUADRANT OF LEFT BREAST IN FEMALE, ESTROGEN RECEPTOR POSITIVE (HCC): Primary | ICD-10-CM

## 2020-08-03 DIAGNOSIS — Z51.81 ENCOUNTER FOR MONITORING CARDIOTOXIC DRUG THERAPY: ICD-10-CM

## 2020-08-03 LAB
ALBUMIN SERPL-MCNC: 3.7 G/DL (ref 3.4–5)
ALBUMIN/GLOB SERPL: 0.9 {RATIO} (ref 1–2)
ALP LIVER SERPL-CCNC: 64 U/L (ref 39–100)
ALT SERPL-CCNC: 36 U/L (ref 13–56)
ANION GAP SERPL CALC-SCNC: 5 MMOL/L (ref 0–18)
AST SERPL-CCNC: 33 U/L (ref 15–37)
BASOPHILS # BLD AUTO: 0.03 X10(3) UL (ref 0–0.2)
BASOPHILS NFR BLD AUTO: 0.6 %
BILIRUB SERPL-MCNC: 0.4 MG/DL (ref 0.1–2)
BUN BLD-MCNC: 12 MG/DL (ref 7–18)
BUN/CREAT SERPL: 16.4 (ref 10–20)
CALCIUM BLD-MCNC: 9.5 MG/DL (ref 8.5–10.1)
CHLORIDE SERPL-SCNC: 108 MMOL/L (ref 98–112)
CO2 SERPL-SCNC: 28 MMOL/L (ref 21–32)
CREAT BLD-MCNC: 0.73 MG/DL (ref 0.55–1.02)
DEPRECATED RDW RBC AUTO: 54.2 FL (ref 35.1–46.3)
EOSINOPHIL # BLD AUTO: 0.11 X10(3) UL (ref 0–0.7)
EOSINOPHIL NFR BLD AUTO: 2.4 %
ERYTHROCYTE [DISTWIDTH] IN BLOOD BY AUTOMATED COUNT: 19.5 % (ref 11–15)
GLOBULIN PLAS-MCNC: 4.1 G/DL (ref 2.8–4.4)
GLUCOSE BLD-MCNC: 116 MG/DL (ref 70–99)
HCT VFR BLD AUTO: 39.5 % (ref 35–48)
HGB BLD-MCNC: 12.3 G/DL (ref 12–16)
IMM GRANULOCYTES # BLD AUTO: 0.01 X10(3) UL (ref 0–1)
IMM GRANULOCYTES NFR BLD: 0.2 %
LYMPHOCYTES # BLD AUTO: 1.24 X10(3) UL (ref 1–4)
LYMPHOCYTES NFR BLD AUTO: 26.7 %
M PROTEIN MFR SERPL ELPH: 7.8 G/DL (ref 6.4–8.2)
MCH RBC QN AUTO: 24.2 PG (ref 26–34)
MCHC RBC AUTO-ENTMCNC: 31.1 G/DL (ref 31–37)
MCV RBC AUTO: 77.8 FL (ref 80–100)
MONOCYTES # BLD AUTO: 0.26 X10(3) UL (ref 0.1–1)
MONOCYTES NFR BLD AUTO: 5.6 %
NEUTROPHILS # BLD AUTO: 2.99 X10 (3) UL (ref 1.5–7.7)
NEUTROPHILS # BLD AUTO: 2.99 X10(3) UL (ref 1.5–7.7)
NEUTROPHILS NFR BLD AUTO: 64.5 %
OSMOLALITY SERPL CALC.SUM OF ELEC: 293 MOSM/KG (ref 275–295)
PATIENT FASTING Y/N/NP: NO
PLATELET # BLD AUTO: 169 10(3)UL (ref 150–450)
POTASSIUM SERPL-SCNC: 3.7 MMOL/L (ref 3.5–5.1)
RBC # BLD AUTO: 5.08 X10(6)UL (ref 3.8–5.3)
SODIUM SERPL-SCNC: 141 MMOL/L (ref 136–145)
WBC # BLD AUTO: 4.6 X10(3) UL (ref 4–11)

## 2020-08-03 PROCEDURE — 99215 OFFICE O/P EST HI 40 MIN: CPT | Performed by: SPECIALIST

## 2020-08-03 PROCEDURE — 96375 TX/PRO/DX INJ NEW DRUG ADDON: CPT

## 2020-08-03 PROCEDURE — 80053 COMPREHEN METABOLIC PANEL: CPT

## 2020-08-03 PROCEDURE — 85025 COMPLETE CBC W/AUTO DIFF WBC: CPT

## 2020-08-03 PROCEDURE — 96413 CHEMO IV INFUSION 1 HR: CPT

## 2020-08-03 NOTE — PROGRESS NOTES
Tolerated iron infusion and chemo without incident. Did have a complaint about ring finger nails on both hands. .. the nail seems to be starting to peel. Denies pain and will monitor. Used ice packs during kadcyla infusion.   Will call with any questions

## 2020-08-24 NOTE — PROGRESS NOTES
THE [facility] Hematology Oncology Group Progress Note      Patient Name: Lori Verdugo   YOB: 1970  Medical Record Number: [id number]  Attending Physician: Elzbieta Kelley. Benoit Weinstein M.D.      Date of Visit: 8/25/2020      Chief Complaint  Invasive ductal on 96/24/0569 and was complicated by diarrhea and superficial fungal infection. Cycle 5 was started on 73/19/3582 and was complicated by diarrhea and superficial fungal infection. Cycle 6 was started on 76/30/6571 and was complicated by diarrhea.      On 02 2 tablets (300 mcg total) by mouth before breakfast. Take 300 mcg by mouth before breakfast., Disp: 180 tablet, Rfl: 0  Levothyroxine Sodium 50 MCG Oral Tab, Take 1/2 tablet (25 mcg)by mouth before breakfast  in addition to 300 mcg (total 325 mcg), Disp: 9 clear to auscultation bilaterally. Abdomen  No distended. Extremities  No lower extremity edema. Integumentary  No rashes. Neurologic  Motor and sensory grossly intact. Psychiatric  Mood and affect appropriate.       Laboratory   No results found for

## 2020-08-25 ENCOUNTER — OFFICE VISIT (OUTPATIENT)
Dept: HEMATOLOGY/ONCOLOGY | Facility: HOSPITAL | Age: 50
End: 2020-08-25
Attending: SPECIALIST
Payer: COMMERCIAL

## 2020-08-25 VITALS
OXYGEN SATURATION: 97 % | RESPIRATION RATE: 16 BRPM | DIASTOLIC BLOOD PRESSURE: 76 MMHG | WEIGHT: 163 LBS | TEMPERATURE: 98 F | SYSTOLIC BLOOD PRESSURE: 119 MMHG | HEIGHT: 66.54 IN | HEART RATE: 71 BPM | BODY MASS INDEX: 25.89 KG/M2

## 2020-08-25 DIAGNOSIS — E03.9 HYPOTHYROIDISM, UNSPECIFIED TYPE: ICD-10-CM

## 2020-08-25 DIAGNOSIS — Z17.0 MALIGNANT NEOPLASM OF UPPER-OUTER QUADRANT OF LEFT BREAST IN FEMALE, ESTROGEN RECEPTOR POSITIVE (HCC): Primary | ICD-10-CM

## 2020-08-25 DIAGNOSIS — D50.0 IRON DEFICIENCY ANEMIA SECONDARY TO BLOOD LOSS (CHRONIC): ICD-10-CM

## 2020-08-25 DIAGNOSIS — E03.9 ACQUIRED HYPOTHYROIDISM: ICD-10-CM

## 2020-08-25 DIAGNOSIS — C50.912 MALIGNANT NEOPLASM OF LEFT BREAST IN FEMALE, ESTROGEN RECEPTOR POSITIVE, UNSPECIFIED SITE OF BREAST (HCC): ICD-10-CM

## 2020-08-25 DIAGNOSIS — C50.412 MALIGNANT NEOPLASM OF UPPER-OUTER QUADRANT OF LEFT BREAST IN FEMALE, ESTROGEN RECEPTOR POSITIVE (HCC): Primary | ICD-10-CM

## 2020-08-25 DIAGNOSIS — D50.8 IRON DEFICIENCY ANEMIA REFRACTORY TO IRON THERAPY: ICD-10-CM

## 2020-08-25 DIAGNOSIS — Z17.0 MALIGNANT NEOPLASM OF LEFT BREAST IN FEMALE, ESTROGEN RECEPTOR POSITIVE, UNSPECIFIED SITE OF BREAST (HCC): ICD-10-CM

## 2020-08-25 DIAGNOSIS — F32.A DEPRESSION, UNSPECIFIED DEPRESSION TYPE: ICD-10-CM

## 2020-08-25 LAB
ALBUMIN SERPL-MCNC: 3.6 G/DL (ref 3.4–5)
ALBUMIN/GLOB SERPL: 0.9 {RATIO} (ref 1–2)
ALP LIVER SERPL-CCNC: 63 U/L (ref 39–100)
ALT SERPL-CCNC: 41 U/L (ref 13–56)
ANION GAP SERPL CALC-SCNC: 1 MMOL/L (ref 0–18)
AST SERPL-CCNC: 37 U/L (ref 15–37)
BASOPHILS # BLD AUTO: 0.02 X10(3) UL (ref 0–0.2)
BASOPHILS NFR BLD AUTO: 0.3 %
BILIRUB SERPL-MCNC: 0.4 MG/DL (ref 0.1–2)
BUN BLD-MCNC: 10 MG/DL (ref 7–18)
BUN/CREAT SERPL: 14.5 (ref 10–20)
CALCIUM BLD-MCNC: 9 MG/DL (ref 8.5–10.1)
CHLORIDE SERPL-SCNC: 107 MMOL/L (ref 98–112)
CO2 SERPL-SCNC: 31 MMOL/L (ref 21–32)
CREAT BLD-MCNC: 0.69 MG/DL (ref 0.55–1.02)
DEPRECATED RDW RBC AUTO: 51.2 FL (ref 35.1–46.3)
EOSINOPHIL # BLD AUTO: 0.09 X10(3) UL (ref 0–0.7)
EOSINOPHIL NFR BLD AUTO: 1.3 %
ERYTHROCYTE [DISTWIDTH] IN BLOOD BY AUTOMATED COUNT: 18.1 % (ref 11–15)
GLOBULIN PLAS-MCNC: 3.8 G/DL (ref 2.8–4.4)
GLUCOSE BLD-MCNC: 98 MG/DL (ref 70–99)
HCT VFR BLD AUTO: 38.5 % (ref 35–48)
HGB BLD-MCNC: 12.2 G/DL (ref 12–16)
IMM GRANULOCYTES # BLD AUTO: 0.02 X10(3) UL (ref 0–1)
IMM GRANULOCYTES NFR BLD: 0.3 %
LYMPHOCYTES # BLD AUTO: 1.52 X10(3) UL (ref 1–4)
LYMPHOCYTES NFR BLD AUTO: 22.1 %
M PROTEIN MFR SERPL ELPH: 7.4 G/DL (ref 6.4–8.2)
MCH RBC QN AUTO: 24.8 PG (ref 26–34)
MCHC RBC AUTO-ENTMCNC: 31.7 G/DL (ref 31–37)
MCV RBC AUTO: 78.4 FL (ref 80–100)
MONOCYTES # BLD AUTO: 0.37 X10(3) UL (ref 0.1–1)
MONOCYTES NFR BLD AUTO: 5.4 %
NEUTROPHILS # BLD AUTO: 4.87 X10 (3) UL (ref 1.5–7.7)
NEUTROPHILS # BLD AUTO: 4.87 X10(3) UL (ref 1.5–7.7)
NEUTROPHILS NFR BLD AUTO: 70.6 %
OSMOLALITY SERPL CALC.SUM OF ELEC: 287 MOSM/KG (ref 275–295)
PLATELET # BLD AUTO: 158 10(3)UL (ref 150–450)
POTASSIUM SERPL-SCNC: 3.7 MMOL/L (ref 3.5–5.1)
RBC # BLD AUTO: 4.91 X10(6)UL (ref 3.8–5.3)
SODIUM SERPL-SCNC: 139 MMOL/L (ref 136–145)
WBC # BLD AUTO: 6.9 X10(3) UL (ref 4–11)

## 2020-08-25 PROCEDURE — 99215 OFFICE O/P EST HI 40 MIN: CPT | Performed by: SPECIALIST

## 2020-08-25 PROCEDURE — 80053 COMPREHEN METABOLIC PANEL: CPT

## 2020-08-25 PROCEDURE — 96413 CHEMO IV INFUSION 1 HR: CPT

## 2020-08-25 PROCEDURE — 96375 TX/PRO/DX INJ NEW DRUG ADDON: CPT

## 2020-08-25 PROCEDURE — 85025 COMPLETE CBC W/AUTO DIFF WBC: CPT

## 2020-08-25 NOTE — PROGRESS NOTES
Pt here for C8D1 Kadcyla (with venofer).   Arrives Ambulating independently, accompanied by Self           Patient reports possible pregnancy since last therapy cycle: No    Modifications in dose or schedule: No     Frequency of blood return and site check

## 2020-09-11 ENCOUNTER — TELEPHONE (OUTPATIENT)
Dept: HEMATOLOGY/ONCOLOGY | Facility: HOSPITAL | Age: 50
End: 2020-09-11

## 2020-09-11 NOTE — TELEPHONE ENCOUNTER
Marguerite Santino said she thinks her chemo is supposed to be moved to this coming Monday.  Please call

## 2020-09-12 NOTE — PROGRESS NOTES
THE [facility]cology [facility] Progress Note      Patient Name: Duncan Eduardo   YOB: 1970  Medical Record Number: [id number]  Attending Physician: Doretha Zarate. Jihan Barnes M.D.      Date of Visit: 9/14/2020      Chief Complaint  Invasive ductal on 89/80/6016 and was complicated by diarrhea and superficial fungal infection. Cycle 5 was started on 49/48/2883 and was complicated by diarrhea and superficial fungal infection. Cycle 6 was started on 12/74/1395 and was complicated by diarrhea.      On 02 2 tablets (300 mcg total) by mouth before breakfast. Take 300 mcg by mouth before breakfast., Disp: 180 tablet, Rfl: 0  Levothyroxine Sodium 50 MCG Oral Tab, Take 1/2 tablet (25 mcg)by mouth before breakfast  in addition to 300 mcg (total 325 mcg), Disp: 9 clear to auscultation bilaterally. Abdomen  No distended. Extremities  No lower extremity edema. Integumentary  No rashes. Neurologic  Motor and sensory grossly intact. Psychiatric  Mood and affect appropriate.       Laboratory   Recent Results (from Iron sucrose 200 mg IV today; this is dose #5 - final dose. She had a colonoscopy in 12/2019 and was advised by GI to undergo repeat colonoscopy in 3 years. Hemoglobin is now normal.    Planned Follow Up   Patient will return for treatment in 3 weeks.     R

## 2020-09-14 ENCOUNTER — OFFICE VISIT (OUTPATIENT)
Dept: HEMATOLOGY/ONCOLOGY | Facility: HOSPITAL | Age: 50
End: 2020-09-14
Attending: SPECIALIST
Payer: COMMERCIAL

## 2020-09-14 VITALS
SYSTOLIC BLOOD PRESSURE: 112 MMHG | WEIGHT: 162 LBS | RESPIRATION RATE: 16 BRPM | HEIGHT: 66.54 IN | HEART RATE: 75 BPM | TEMPERATURE: 98 F | BODY MASS INDEX: 25.73 KG/M2 | OXYGEN SATURATION: 98 % | DIASTOLIC BLOOD PRESSURE: 68 MMHG

## 2020-09-14 DIAGNOSIS — Z17.0 MALIGNANT NEOPLASM OF UPPER-OUTER QUADRANT OF LEFT BREAST IN FEMALE, ESTROGEN RECEPTOR POSITIVE (HCC): Primary | ICD-10-CM

## 2020-09-14 DIAGNOSIS — Z17.0 MALIGNANT NEOPLASM OF LEFT BREAST IN FEMALE, ESTROGEN RECEPTOR POSITIVE, UNSPECIFIED SITE OF BREAST (HCC): ICD-10-CM

## 2020-09-14 DIAGNOSIS — F32.A DEPRESSION, UNSPECIFIED DEPRESSION TYPE: ICD-10-CM

## 2020-09-14 DIAGNOSIS — D50.8 IRON DEFICIENCY ANEMIA REFRACTORY TO IRON THERAPY: ICD-10-CM

## 2020-09-14 DIAGNOSIS — E03.9 ACQUIRED HYPOTHYROIDISM: ICD-10-CM

## 2020-09-14 DIAGNOSIS — Z51.81 ENCOUNTER FOR MONITORING CARDIOTOXIC DRUG THERAPY: Primary | ICD-10-CM

## 2020-09-14 DIAGNOSIS — C50.912 MALIGNANT NEOPLASM OF LEFT BREAST IN FEMALE, ESTROGEN RECEPTOR POSITIVE, UNSPECIFIED SITE OF BREAST (HCC): ICD-10-CM

## 2020-09-14 DIAGNOSIS — Z79.899 ENCOUNTER FOR MONITORING CARDIOTOXIC DRUG THERAPY: Primary | ICD-10-CM

## 2020-09-14 DIAGNOSIS — E03.9 HYPOTHYROIDISM, UNSPECIFIED TYPE: ICD-10-CM

## 2020-09-14 DIAGNOSIS — Z17.0 MALIGNANT NEOPLASM OF UPPER-OUTER QUADRANT OF LEFT BREAST IN FEMALE, ESTROGEN RECEPTOR POSITIVE (HCC): ICD-10-CM

## 2020-09-14 DIAGNOSIS — C50.412 MALIGNANT NEOPLASM OF UPPER-OUTER QUADRANT OF LEFT BREAST IN FEMALE, ESTROGEN RECEPTOR POSITIVE (HCC): ICD-10-CM

## 2020-09-14 DIAGNOSIS — D50.0 IRON DEFICIENCY ANEMIA SECONDARY TO BLOOD LOSS (CHRONIC): ICD-10-CM

## 2020-09-14 DIAGNOSIS — C50.412 MALIGNANT NEOPLASM OF UPPER-OUTER QUADRANT OF LEFT BREAST IN FEMALE, ESTROGEN RECEPTOR POSITIVE (HCC): Primary | ICD-10-CM

## 2020-09-14 LAB
ALBUMIN SERPL-MCNC: 3.7 G/DL (ref 3.4–5)
ALBUMIN/GLOB SERPL: 0.9 {RATIO} (ref 1–2)
ALP LIVER SERPL-CCNC: 64 U/L (ref 39–100)
ALT SERPL-CCNC: 44 U/L (ref 13–56)
ANION GAP SERPL CALC-SCNC: 4 MMOL/L (ref 0–18)
AST SERPL-CCNC: 36 U/L (ref 15–37)
BASOPHILS # BLD AUTO: 0.03 X10(3) UL (ref 0–0.2)
BASOPHILS NFR BLD AUTO: 0.7 %
BILIRUB SERPL-MCNC: 0.4 MG/DL (ref 0.1–2)
BUN BLD-MCNC: 12 MG/DL (ref 7–18)
BUN/CREAT SERPL: 14.3 (ref 10–20)
CALCIUM BLD-MCNC: 9.3 MG/DL (ref 8.5–10.1)
CHLORIDE SERPL-SCNC: 108 MMOL/L (ref 98–112)
CO2 SERPL-SCNC: 29 MMOL/L (ref 21–32)
CREAT BLD-MCNC: 0.84 MG/DL (ref 0.55–1.02)
DEPRECATED RDW RBC AUTO: 49.7 FL (ref 35.1–46.3)
EOSINOPHIL # BLD AUTO: 0.09 X10(3) UL (ref 0–0.7)
EOSINOPHIL NFR BLD AUTO: 2.2 %
ERYTHROCYTE [DISTWIDTH] IN BLOOD BY AUTOMATED COUNT: 16.6 % (ref 11–15)
GLOBULIN PLAS-MCNC: 4 G/DL (ref 2.8–4.4)
GLUCOSE BLD-MCNC: 114 MG/DL (ref 70–99)
HCT VFR BLD AUTO: 41.8 % (ref 35–48)
HGB BLD-MCNC: 12.7 G/DL (ref 12–16)
IMM GRANULOCYTES # BLD AUTO: 0.01 X10(3) UL (ref 0–1)
IMM GRANULOCYTES NFR BLD: 0.2 %
LYMPHOCYTES # BLD AUTO: 1.28 X10(3) UL (ref 1–4)
LYMPHOCYTES NFR BLD AUTO: 30.8 %
M PROTEIN MFR SERPL ELPH: 7.7 G/DL (ref 6.4–8.2)
MCH RBC QN AUTO: 25 PG (ref 26–34)
MCHC RBC AUTO-ENTMCNC: 30.4 G/DL (ref 31–37)
MCV RBC AUTO: 82.4 FL (ref 80–100)
MONOCYTES # BLD AUTO: 0.29 X10(3) UL (ref 0.1–1)
MONOCYTES NFR BLD AUTO: 7 %
NEUTROPHILS # BLD AUTO: 2.46 X10 (3) UL (ref 1.5–7.7)
NEUTROPHILS # BLD AUTO: 2.46 X10(3) UL (ref 1.5–7.7)
NEUTROPHILS NFR BLD AUTO: 59.1 %
OSMOLALITY SERPL CALC.SUM OF ELEC: 293 MOSM/KG (ref 275–295)
PATIENT FASTING Y/N/NP: NO
PLATELET # BLD AUTO: 123 10(3)UL (ref 150–450)
POTASSIUM SERPL-SCNC: 3.5 MMOL/L (ref 3.5–5.1)
RBC # BLD AUTO: 5.07 X10(6)UL (ref 3.8–5.3)
SODIUM SERPL-SCNC: 141 MMOL/L (ref 136–145)
TSI SER-ACNC: <0.005 MIU/ML (ref 0.36–3.74)
WBC # BLD AUTO: 4.2 X10(3) UL (ref 4–11)

## 2020-09-14 PROCEDURE — 96413 CHEMO IV INFUSION 1 HR: CPT

## 2020-09-14 PROCEDURE — 84443 ASSAY THYROID STIM HORMONE: CPT

## 2020-09-14 PROCEDURE — 85025 COMPLETE CBC W/AUTO DIFF WBC: CPT

## 2020-09-14 PROCEDURE — 96375 TX/PRO/DX INJ NEW DRUG ADDON: CPT

## 2020-09-14 PROCEDURE — 99215 OFFICE O/P EST HI 40 MIN: CPT | Performed by: SPECIALIST

## 2020-09-14 PROCEDURE — 80053 COMPREHEN METABOLIC PANEL: CPT

## 2020-09-14 NOTE — PROGRESS NOTES
Pt here for C9D1 of Kadcyla.   Arrives Ambulating independently, accompanied by Self           Patient reports possible pregnancy since last therapy cycle: No    Modifications in dose or schedule: No     Frequency of blood return and site check throughout a

## 2020-09-14 NOTE — PROGRESS NOTES
Patient is here today for follow up with Vanna Calderon for Breast Cancer. Patient stated joint pain in hands and left foot. Denies nausea. Appetite is good. Fatigued. Medication list and medical history were reviewed and updated.      Education Record    Twin Lopez

## 2020-09-15 NOTE — PROGRESS NOTES
Mee,  Your TSH was still low and I would suggest you drop dosage again to 300 mcg daily. First though, you should skip one day altogether to drop to a better dose even more quickly.    We will recheck the TSH again in about 5-6 weeks and I have enter

## 2020-09-18 ENCOUNTER — HOSPITAL ENCOUNTER (OUTPATIENT)
Dept: CV DIAGNOSTICS | Facility: HOSPITAL | Age: 50
Discharge: HOME | End: 2020-09-18
Attending: SPECIALIST
Payer: COMMERCIAL

## 2020-09-18 DIAGNOSIS — Z51.81 ENCOUNTER FOR MONITORING CARDIOTOXIC DRUG THERAPY: ICD-10-CM

## 2020-09-18 DIAGNOSIS — Z79.899 ENCOUNTER FOR MONITORING CARDIOTOXIC DRUG THERAPY: ICD-10-CM

## 2020-09-18 PROCEDURE — 93307 TTE W/O DOPPLER COMPLETE: CPT | Performed by: SPECIALIST

## 2020-09-18 PROCEDURE — 93356 MYOCRD STRAIN IMG SPCKL TRCK: CPT | Performed by: SPECIALIST

## 2020-09-21 ENCOUNTER — APPOINTMENT (OUTPATIENT)
Dept: HEMATOLOGY/ONCOLOGY | Facility: HOSPITAL | Age: 50
End: 2020-09-21
Attending: SPECIALIST
Payer: COMMERCIAL

## 2020-09-30 ENCOUNTER — OFFICE VISIT (OUTPATIENT)
Dept: SURGERY | Facility: CLINIC | Age: 50
End: 2020-09-30
Payer: COMMERCIAL

## 2020-09-30 VITALS — TEMPERATURE: 98 F

## 2020-09-30 DIAGNOSIS — Z17.0 MALIGNANT NEOPLASM OF UPPER-OUTER QUADRANT OF LEFT BREAST IN FEMALE, ESTROGEN RECEPTOR POSITIVE (HCC): Primary | ICD-10-CM

## 2020-09-30 DIAGNOSIS — C50.412 MALIGNANT NEOPLASM OF UPPER-OUTER QUADRANT OF LEFT BREAST IN FEMALE, ESTROGEN RECEPTOR POSITIVE (HCC): Primary | ICD-10-CM

## 2020-09-30 PROCEDURE — 99213 OFFICE O/P EST LOW 20 MIN: CPT | Performed by: SURGERY

## 2020-09-30 NOTE — PROGRESS NOTES
Follow Up Visit Note       Active Problems      1. Malignant neoplasm of upper-outer quadrant of left breast in female, estrogen receptor positive [facility])          Chief Complaint   Patient presents with:  Breast Follow-up: mastectomy 02/2020. feels good. HISTORY  1994    LEEP after abnormal colposcopy, HPV+   • PORT, INDWELLING, IMP         The family history and social history have been reviewed by me today.     Family History   Problem Relation Age of Onset   • Heart Disorder Father         CHF   • Diabet change. HENT: Negative for hearing loss, nosebleeds, sore throat and trouble swallowing. Respiratory: Negative for apnea, cough, shortness of breath and wheezing. Cardiovascular: Negative for chest pain, palpitations and leg swelling.    Gastrointes neoplasm of upper-outer quadrant of left breast in female, estrogen receptor positive ([facility] [address]  (primary encounter diagnosis)    Plan   · The patient has a benign exam without evidence of recurrence.   · She would like to have her port removed before calendars

## 2020-10-02 NOTE — PROGRESS NOTES
[facility]ematology Oncology Group Progress Note      Patient Name: Gilbert Barriga   YOB: 1970  Medical Record Number: [id number]  Attending Physician: Hugh Davis M.D.      Date of Visit: 10/5/2020      Chief Complaint  Invasive ductal 97/60/7960 and was complicated by diarrhea and superficial fungal infection. Cycle 5 was started on 69/66/1414 and was complicated by diarrhea and superficial fungal infection. Cycle 6 was started on 37/72/4755 and was complicated by diarrhea.      On 02/17 Take 2 tablets (300 mcg total) by mouth before breakfast. Take 300 mcg by mouth before breakfast., Disp: 180 tablet, Rfl: 0    •  Tamoxifen Citrate 20 MG Oral Tab, Take 1 tablet (20 mg total) by mouth daily. , Disp: 90 tablet, Rfl: 3    •  Sertraline HCl 10 appropriate.       Laboratory   Recent Results (from the past 24 hour(s))   CBC W/ DIFFERENTIAL    Collection Time: 10/05/20  8:43 AM   Result Value Ref Range    WBC 4.4 4.0 - 11.0 x10(3) uL    RBC 5.39 (H) 3.80 - 5.30 x10(6)uL    HGB 13.7 12.0 - 16.0 g/dL Jasen     ----------------------------------------------------------------------------  History/Indications:   The patient is undergoingchemotherapy.     ----------------------------------------------------------------------------  Procedure information: size.     ----------------------------------------------------------------------------  Measurements      Left ventricle                          Value        Reference   LV ID, ED, PLAX                         4.8   cm     3.9 - 5.3   LV ID, ES, PLAX is estrogen and progesterone receptor positive and Her2/samina positive. Based upon results of the Isaura Funger study, patient started adjuvant Kadcyla on 03/30/2020. In mid 04/2020 she began adjuvant therapy with tamoxifen. Continue both without modification.

## 2020-10-05 ENCOUNTER — OFFICE VISIT (OUTPATIENT)
Dept: HEMATOLOGY/ONCOLOGY | Facility: HOSPITAL | Age: 50
End: 2020-10-05
Attending: SPECIALIST
Payer: COMMERCIAL

## 2020-10-05 VITALS
RESPIRATION RATE: 16 BRPM | TEMPERATURE: 98 F | DIASTOLIC BLOOD PRESSURE: 65 MMHG | SYSTOLIC BLOOD PRESSURE: 109 MMHG | HEART RATE: 87 BPM | WEIGHT: 162 LBS | BODY MASS INDEX: 26 KG/M2

## 2020-10-05 DIAGNOSIS — D50.0 IRON DEFICIENCY ANEMIA SECONDARY TO BLOOD LOSS (CHRONIC): ICD-10-CM

## 2020-10-05 DIAGNOSIS — Z17.0 MALIGNANT NEOPLASM OF UPPER-OUTER QUADRANT OF LEFT BREAST IN FEMALE, ESTROGEN RECEPTOR POSITIVE (HCC): Primary | ICD-10-CM

## 2020-10-05 DIAGNOSIS — C50.412 MALIGNANT NEOPLASM OF UPPER-OUTER QUADRANT OF LEFT BREAST IN FEMALE, ESTROGEN RECEPTOR POSITIVE (HCC): Primary | ICD-10-CM

## 2020-10-05 DIAGNOSIS — D50.8 IRON DEFICIENCY ANEMIA REFRACTORY TO IRON THERAPY: ICD-10-CM

## 2020-10-05 DIAGNOSIS — E03.9 HYPOTHYROIDISM, UNSPECIFIED TYPE: ICD-10-CM

## 2020-10-05 DIAGNOSIS — E03.9 ACQUIRED HYPOTHYROIDISM: ICD-10-CM

## 2020-10-05 DIAGNOSIS — F32.A DEPRESSION, UNSPECIFIED DEPRESSION TYPE: ICD-10-CM

## 2020-10-05 PROCEDURE — 99215 OFFICE O/P EST HI 40 MIN: CPT | Performed by: SPECIALIST

## 2020-10-05 PROCEDURE — 80053 COMPREHEN METABOLIC PANEL: CPT

## 2020-10-05 PROCEDURE — 96375 TX/PRO/DX INJ NEW DRUG ADDON: CPT

## 2020-10-05 PROCEDURE — 85025 COMPLETE CBC W/AUTO DIFF WBC: CPT

## 2020-10-05 PROCEDURE — 96413 CHEMO IV INFUSION 1 HR: CPT

## 2020-10-05 NOTE — PROGRESS NOTES
Pt here for C10D1.   Arrives Ambulating independently, accompanied by Self          Modifications in dose or schedule: No    Pt denies current pregnancy     Frequency of blood return and site check throughout administration: Prior to administration   Soumya

## 2020-10-05 NOTE — PROGRESS NOTES
Outpatient Oncology Care Plan  Problem list:  fatigue  sores corners of the mouth    Problems related to:    chemotherapy  side effect of treatment    Interventions:  emotional support given  provided oral care regime  encourage activity as tolerated  myrna

## 2020-10-25 NOTE — PROGRESS NOTES
THE [facility] Hematology Oncology Group Progress Note      Patient Name: Annie Hale   YOB: 1970  Medical Record Number: [id number]  Attending Physician: Ruth Krishnamurthy. Patrica Ruiz M.D.      Date of Visit: 10/26/2020      Chief Complaint  Invasive ductal started on 55/02/2550 and was complicated by diarrhea and superficial fungal infection. Cycle 5 was started on 17/72/0312 and was complicated by diarrhea and superficial fungal infection. Cycle 6 was started on 01/38/3527 and was complicated by diarrhea. 150 MCG Oral Tab, Take 2 tablets (300 mcg total) by mouth before breakfast. Take 300 mcg by mouth before breakfast., Disp: 180 tablet, Rfl: 0    •  Tamoxifen Citrate 20 MG Oral Tab, Take 1 tablet (20 mg total) by mouth daily. , Disp: 90 tablet, Rfl: 3    • Chloride 108 98 - 112 mmol/L    CO2 29.0 21.0 - 32.0 mmol/L    Anion Gap 4 0 - 18 mmol/L    BUN 11 7 - 18 mg/dL    Creatinine 0.76 0.55 - 1.02 mg/dL    BUN/CREA Ratio 14.5 10.0 - 20.0    Calcium, Total 9.3 8.5 - 10.1 mg/dL    Calculated Osmolality 292 2 Nasopharyngeal swab; Other   Result Value Ref Range    SARS-CoV-2 (COVID-19) PCR (QS7) NOT DETECTED Not Detected      Impression and Plan   1. Invasive ductal carcinoma, left breast: [id number].  Tumor was estrogen and progesterone receptor positive and Her

## 2020-10-26 ENCOUNTER — OFFICE VISIT (OUTPATIENT)
Dept: HEMATOLOGY/ONCOLOGY | Facility: HOSPITAL | Age: 50
End: 2020-10-26
Attending: SPECIALIST
Payer: COMMERCIAL

## 2020-10-26 VITALS
TEMPERATURE: 98 F | OXYGEN SATURATION: 94 % | DIASTOLIC BLOOD PRESSURE: 80 MMHG | RESPIRATION RATE: 16 BRPM | HEIGHT: 66.54 IN | HEART RATE: 77 BPM | BODY MASS INDEX: 25.83 KG/M2 | SYSTOLIC BLOOD PRESSURE: 133 MMHG | WEIGHT: 162.63 LBS

## 2020-10-26 DIAGNOSIS — C50.412 MALIGNANT NEOPLASM OF UPPER-OUTER QUADRANT OF LEFT BREAST IN FEMALE, ESTROGEN RECEPTOR POSITIVE (HCC): Primary | ICD-10-CM

## 2020-10-26 DIAGNOSIS — E03.9 ACQUIRED HYPOTHYROIDISM: ICD-10-CM

## 2020-10-26 DIAGNOSIS — T45.1X5A CHEMOTHERAPY-INDUCED THROMBOCYTOPENIA: ICD-10-CM

## 2020-10-26 DIAGNOSIS — Z17.0 MALIGNANT NEOPLASM OF LEFT BREAST IN FEMALE, ESTROGEN RECEPTOR POSITIVE, UNSPECIFIED SITE OF BREAST (HCC): ICD-10-CM

## 2020-10-26 DIAGNOSIS — C50.412 MALIGNANT NEOPLASM OF UPPER-OUTER QUADRANT OF LEFT BREAST IN FEMALE, ESTROGEN RECEPTOR POSITIVE (HCC): ICD-10-CM

## 2020-10-26 DIAGNOSIS — D50.8 IRON DEFICIENCY ANEMIA REFRACTORY TO IRON THERAPY: ICD-10-CM

## 2020-10-26 DIAGNOSIS — Z17.0 MALIGNANT NEOPLASM OF UPPER-OUTER QUADRANT OF LEFT BREAST IN FEMALE, ESTROGEN RECEPTOR POSITIVE (HCC): ICD-10-CM

## 2020-10-26 DIAGNOSIS — D50.0 IRON DEFICIENCY ANEMIA SECONDARY TO BLOOD LOSS (CHRONIC): Primary | ICD-10-CM

## 2020-10-26 DIAGNOSIS — D50.0 IRON DEFICIENCY ANEMIA SECONDARY TO BLOOD LOSS (CHRONIC): ICD-10-CM

## 2020-10-26 DIAGNOSIS — F32.A DEPRESSION, UNSPECIFIED DEPRESSION TYPE: ICD-10-CM

## 2020-10-26 DIAGNOSIS — D69.59 CHEMOTHERAPY-INDUCED THROMBOCYTOPENIA: ICD-10-CM

## 2020-10-26 DIAGNOSIS — Z17.0 MALIGNANT NEOPLASM OF UPPER-OUTER QUADRANT OF LEFT BREAST IN FEMALE, ESTROGEN RECEPTOR POSITIVE (HCC): Primary | ICD-10-CM

## 2020-10-26 DIAGNOSIS — C50.912 MALIGNANT NEOPLASM OF LEFT BREAST IN FEMALE, ESTROGEN RECEPTOR POSITIVE, UNSPECIFIED SITE OF BREAST (HCC): ICD-10-CM

## 2020-10-26 PROCEDURE — 96413 CHEMO IV INFUSION 1 HR: CPT

## 2020-10-26 PROCEDURE — 85025 COMPLETE CBC W/AUTO DIFF WBC: CPT

## 2020-10-26 PROCEDURE — 83550 IRON BINDING TEST: CPT

## 2020-10-26 PROCEDURE — 82728 ASSAY OF FERRITIN: CPT

## 2020-10-26 PROCEDURE — 83540 ASSAY OF IRON: CPT

## 2020-10-26 PROCEDURE — 80053 COMPREHEN METABOLIC PANEL: CPT

## 2020-10-26 PROCEDURE — 96375 TX/PRO/DX INJ NEW DRUG ADDON: CPT

## 2020-10-26 PROCEDURE — 99215 OFFICE O/P EST HI 40 MIN: CPT | Performed by: SPECIALIST

## 2020-10-26 RX ORDER — SODIUM CHLORIDE 0.9 % (FLUSH) 0.9 %
10 SYRINGE (ML) INJECTION ONCE
OUTPATIENT
Start: 2020-10-26

## 2020-10-26 RX ORDER — SODIUM CHLORIDE 0.9 % (FLUSH) 0.9 %
10 SYRINGE (ML) INJECTION ONCE
Status: CANCELLED | OUTPATIENT
Start: 2020-10-26

## 2020-10-26 RX ORDER — SODIUM CHLORIDE 0.9 % (FLUSH) 0.9 %
10 SYRINGE (ML) INJECTION ONCE
Status: COMPLETED | OUTPATIENT
Start: 2020-10-26 | End: 2020-10-26

## 2020-10-26 RX ADMIN — SODIUM CHLORIDE 0.9 % (FLUSH) 10 ML: 0.9 % SYRINGE (ML) INJECTION at 11:40:00

## 2020-10-26 NOTE — PROGRESS NOTES
Education Record    Learner:  Patient    Disease / Diagnosis: breast cancer     Barriers / Limitations:  None   Comments:    Method:  Brief focused   Comments:    General Topics:  Medication, Precautions, Procedure, Side effects and symptom management and

## 2020-10-26 NOTE — PROGRESS NOTES
Patient is here today for follow up with Faye Castro for Breast Cancer. C11D1 Kadcyla. Patient denies pain. Fatigued at times. Appetite is good. Medication list and medical history were reviewed and updated.      Education Record    Learner:  Patient    Dise

## 2020-11-09 NOTE — PROGRESS NOTES
[facility] Progress Note      Patient Name: James Weaver   YOB: 1970  Medical Record Number: [id number]  Attending Physician: Artur Chong. Davey Thompson M.D.      Date of Visit: 11/16/2020      Chief Complaint  Invasive ductal started on 27/05/3375 and was complicated by diarrhea and superficial fungal infection. Cycle 5 was started on 43/03/1816 and was complicated by diarrhea and superficial fungal infection. Cycle 6 was started on 35/52/7992 and was complicated by diarrhea. 150 MCG Oral Tab, Take 2 tablets (300 mcg total) by mouth before breakfast. Take 300 mcg by mouth before breakfast., Disp: 180 tablet, Rfl: 0    •  Tamoxifen Citrate 20 MG Oral Tab, Take 1 tablet (20 mg total) by mouth daily. , Disp: 90 tablet, Rfl: 3    • extremity edema. Integumentary  No rashes. Neurologic  Motor and sensory grossly intact. Psychiatric  Mood and affect appropriate.       Laboratory   Recent Results (from the past 168 hour(s))   COMP METABOLIC PANEL (14)    Collection Time: 11/16/20  8: carcinoma, left breast: P5C0D8C0. Tumor was estrogen and progesterone receptor positive and Her2/samina positive. Based upon results of the Madeline Palma study, patient started adjuvant Kadcyla on 03/30/2020.  In mid 04/2020 she began adjuvant therapy with tamoxif

## 2020-11-16 ENCOUNTER — OFFICE VISIT (OUTPATIENT)
Dept: HEMATOLOGY/ONCOLOGY | Facility: HOSPITAL | Age: 50
End: 2020-11-16
Attending: SPECIALIST
Payer: COMMERCIAL

## 2020-11-16 VITALS
HEIGHT: 66.54 IN | OXYGEN SATURATION: 96 % | BODY MASS INDEX: 25.83 KG/M2 | HEART RATE: 74 BPM | SYSTOLIC BLOOD PRESSURE: 113 MMHG | DIASTOLIC BLOOD PRESSURE: 72 MMHG | TEMPERATURE: 98 F | WEIGHT: 162.63 LBS | RESPIRATION RATE: 16 BRPM

## 2020-11-16 DIAGNOSIS — C50.412 MALIGNANT NEOPLASM OF UPPER-OUTER QUADRANT OF LEFT BREAST IN FEMALE, ESTROGEN RECEPTOR POSITIVE (HCC): Primary | ICD-10-CM

## 2020-11-16 DIAGNOSIS — Z17.0 MALIGNANT NEOPLASM OF UPPER-OUTER QUADRANT OF LEFT BREAST IN FEMALE, ESTROGEN RECEPTOR POSITIVE (HCC): Primary | ICD-10-CM

## 2020-11-16 DIAGNOSIS — D69.59 CHEMOTHERAPY-INDUCED THROMBOCYTOPENIA: ICD-10-CM

## 2020-11-16 DIAGNOSIS — F32.A DEPRESSION, UNSPECIFIED DEPRESSION TYPE: ICD-10-CM

## 2020-11-16 DIAGNOSIS — T45.1X5A CHEMOTHERAPY-INDUCED THROMBOCYTOPENIA: ICD-10-CM

## 2020-11-16 DIAGNOSIS — D50.8 IRON DEFICIENCY ANEMIA REFRACTORY TO IRON THERAPY: ICD-10-CM

## 2020-11-16 DIAGNOSIS — E03.9 ACQUIRED HYPOTHYROIDISM: ICD-10-CM

## 2020-11-16 DIAGNOSIS — D50.0 IRON DEFICIENCY ANEMIA SECONDARY TO BLOOD LOSS (CHRONIC): ICD-10-CM

## 2020-11-16 PROCEDURE — 85025 COMPLETE CBC W/AUTO DIFF WBC: CPT

## 2020-11-16 PROCEDURE — 80053 COMPREHEN METABOLIC PANEL: CPT

## 2020-11-16 PROCEDURE — 96375 TX/PRO/DX INJ NEW DRUG ADDON: CPT

## 2020-11-16 PROCEDURE — 99215 OFFICE O/P EST HI 40 MIN: CPT | Performed by: SPECIALIST

## 2020-11-16 PROCEDURE — 96413 CHEMO IV INFUSION 1 HR: CPT

## 2020-11-16 NOTE — PROGRESS NOTES
Patient is here today for follow up with Bibi Aldridge for Breast Cancer. [address]. Patient denies pain. Stated fatigue. Appetite is good. Denies diarrhea or constipation. Medication list and medical history were reviewed and updated.      Education Record

## 2020-11-16 NOTE — PROGRESS NOTES
Pt here for C12D1.   Arrives Ambulating independently, accompanied by Self           Patient reports possible pregnancy since last therapy cycle: No    Modifications in dose or schedule: No     Frequency of blood return and site check throughout administrat

## 2020-12-01 RX ORDER — HEPARIN SODIUM 5000 [USP'U]/ML
5000 INJECTION, SOLUTION INTRAVENOUS; SUBCUTANEOUS ONCE
Status: CANCELLED | OUTPATIENT
Start: 2020-12-01 | End: 2020-12-01

## 2020-12-01 RX ORDER — ACETAMINOPHEN 500 MG
1000 TABLET ORAL ONCE
Status: CANCELLED | OUTPATIENT
Start: 2020-12-01 | End: 2020-12-01

## 2020-12-03 NOTE — PROGRESS NOTES
[facility] Hematology Oncology Group Progress Note      Patient Name: Carmenza Maria   YOB: 1970  Medical Record Number: [id number]  Attending Physician: Natalia Hall. Fredis Dobbs M.D.      Date of Visit: 12/7/2020      Chief Complaint  Invasive ductal started on 15/51/7874 and was complicated by diarrhea and superficial fungal infection. Cycle 5 was started on 02/16/2100 and was complicated by diarrhea and superficial fungal infection. Cycle 6 was started on 72/64/7633 and was complicated by diarrhea. Tab, TAKE 2 TABLETS (300MCG)    BEFORE BREAKFAST, Disp: 180 tablet, Rfl: 0    •  Tamoxifen Citrate 20 MG Oral Tab, Take 1 tablet (20 mg total) by mouth daily. , Disp: 90 tablet, Rfl: 3    •  Sertraline HCl 100 MG Oral Tab, Take 1 tablet (100 mg total) by mo mmol/L    Potassium 3.7 3.5 - 5.1 mmol/L    Chloride 107 98 - 112 mmol/L    CO2 29.0 21.0 - 32.0 mmol/L    Anion Gap 5 0 - 18 mmol/L    BUN 14 7 - 18 mg/dL    Creatinine 0.87 0.55 - 1.02 mg/dL    BUN/CREA Ratio 16.1 10.0 - 20.0    Calcium, Total 9.6 8.5 - 2.   Depression: Patient on long term sertraline. 3.   Hypothyroidism: Patient remains on levothyroxine under the care of her primary care physician. 4.   Thrombocytopenia: Mild in nature and likely related to anticancer therapy.  Continue to fo

## 2020-12-07 ENCOUNTER — OFFICE VISIT (OUTPATIENT)
Dept: HEMATOLOGY/ONCOLOGY | Facility: HOSPITAL | Age: 50
End: 2020-12-07
Attending: SPECIALIST
Payer: COMMERCIAL

## 2020-12-07 ENCOUNTER — LAB ENCOUNTER (OUTPATIENT)
Dept: LAB | Facility: HOSPITAL | Age: 50
End: 2020-12-07
Attending: SURGERY
Payer: COMMERCIAL

## 2020-12-07 VITALS
WEIGHT: 164.38 LBS | OXYGEN SATURATION: 95 % | TEMPERATURE: 97 F | SYSTOLIC BLOOD PRESSURE: 131 MMHG | HEART RATE: 80 BPM | BODY MASS INDEX: 27 KG/M2 | DIASTOLIC BLOOD PRESSURE: 83 MMHG | RESPIRATION RATE: 16 BRPM

## 2020-12-07 DIAGNOSIS — Z17.0 MALIGNANT NEOPLASM OF LEFT BREAST IN FEMALE, ESTROGEN RECEPTOR POSITIVE, UNSPECIFIED SITE OF BREAST (HCC): ICD-10-CM

## 2020-12-07 DIAGNOSIS — F32.A DEPRESSION, UNSPECIFIED DEPRESSION TYPE: ICD-10-CM

## 2020-12-07 DIAGNOSIS — Z17.0 MALIGNANT NEOPLASM OF UPPER-OUTER QUADRANT OF LEFT BREAST IN FEMALE, ESTROGEN RECEPTOR POSITIVE (HCC): Primary | ICD-10-CM

## 2020-12-07 DIAGNOSIS — C50.412 MALIGNANT NEOPLASM OF UPPER-OUTER QUADRANT OF LEFT BREAST IN FEMALE, ESTROGEN RECEPTOR POSITIVE (HCC): Primary | ICD-10-CM

## 2020-12-07 DIAGNOSIS — T45.1X5A CHEMOTHERAPY-INDUCED THROMBOCYTOPENIA: ICD-10-CM

## 2020-12-07 DIAGNOSIS — E03.9 HYPOTHYROIDISM, UNSPECIFIED TYPE: ICD-10-CM

## 2020-12-07 DIAGNOSIS — E03.9 ACQUIRED HYPOTHYROIDISM: ICD-10-CM

## 2020-12-07 DIAGNOSIS — C50.912 MALIGNANT NEOPLASM OF LEFT BREAST IN FEMALE, ESTROGEN RECEPTOR POSITIVE, UNSPECIFIED SITE OF BREAST (HCC): ICD-10-CM

## 2020-12-07 DIAGNOSIS — D69.59 CHEMOTHERAPY-INDUCED THROMBOCYTOPENIA: ICD-10-CM

## 2020-12-07 DIAGNOSIS — D50.8 IRON DEFICIENCY ANEMIA REFRACTORY TO IRON THERAPY: ICD-10-CM

## 2020-12-07 DIAGNOSIS — D50.0 IRON DEFICIENCY ANEMIA SECONDARY TO BLOOD LOSS (CHRONIC): ICD-10-CM

## 2020-12-07 DIAGNOSIS — C50.412 MALIGNANT NEOPLASM OF UPPER-OUTER QUADRANT OF LEFT BREAST IN FEMALE, ESTROGEN RECEPTOR POSITIVE (HCC): ICD-10-CM

## 2020-12-07 DIAGNOSIS — Z17.0 MALIGNANT NEOPLASM OF UPPER-OUTER QUADRANT OF LEFT BREAST IN FEMALE, ESTROGEN RECEPTOR POSITIVE (HCC): ICD-10-CM

## 2020-12-07 PROCEDURE — 96413 CHEMO IV INFUSION 1 HR: CPT

## 2020-12-07 PROCEDURE — 96375 TX/PRO/DX INJ NEW DRUG ADDON: CPT

## 2020-12-07 PROCEDURE — 99215 OFFICE O/P EST HI 40 MIN: CPT | Performed by: SPECIALIST

## 2020-12-07 PROCEDURE — 85025 COMPLETE CBC W/AUTO DIFF WBC: CPT

## 2020-12-07 PROCEDURE — 80053 COMPREHEN METABOLIC PANEL: CPT

## 2020-12-07 RX ORDER — SODIUM CHLORIDE 0.9 % (FLUSH) 0.9 %
10 SYRINGE (ML) INJECTION ONCE
Status: COMPLETED | OUTPATIENT
Start: 2020-12-07 | End: 2020-12-07

## 2020-12-07 RX ORDER — SODIUM CHLORIDE 0.9 % (FLUSH) 0.9 %
10 SYRINGE (ML) INJECTION ONCE
Status: CANCELLED | OUTPATIENT
Start: 2020-12-07

## 2020-12-07 RX ADMIN — SODIUM CHLORIDE 0.9 % (FLUSH) 10 ML: 0.9 % SYRINGE (ML) INJECTION at 10:10:00

## 2020-12-07 NOTE — PROGRESS NOTES
Pt here for C13D1 Kadcyla.   Arrives Ambulating independently, accompanied by Self           Patient reports possible pregnancy since last therapy cycle: No    Modifications in dose or schedule: No     Frequency of blood return and site check throughout adm

## 2020-12-07 NOTE — PROGRESS NOTES
Patient is here today for follow up  with Zayra Saavedra for breast cancer C13D1 Kadcyla. Patient denies pain. Patient is feeling good. Denies adverse side effects. Medication list, medical history and toxicities were reviewed and updated.      Education Record

## 2020-12-10 ENCOUNTER — MED REC SCAN ONLY (OUTPATIENT)
Dept: SURGERY | Facility: CLINIC | Age: 50
End: 2020-12-10

## 2020-12-10 ENCOUNTER — HOSPITAL ENCOUNTER (OUTPATIENT)
Facility: HOSPITAL | Age: 50
Setting detail: HOSPITAL OUTPATIENT SURGERY
Discharge: HOME | End: 2020-12-10
Attending: SURGERY | Admitting: SURGERY
Payer: COMMERCIAL

## 2020-12-10 ENCOUNTER — ANESTHESIA (OUTPATIENT)
Dept: SURGERY | Facility: HOSPITAL | Age: 50
End: 2020-12-10
Payer: COMMERCIAL

## 2020-12-10 ENCOUNTER — APPOINTMENT (OUTPATIENT)
Dept: GENERAL RADIOLOGY | Facility: HOSPITAL | Age: 50
End: 2020-12-10
Attending: SURGERY
Payer: COMMERCIAL

## 2020-12-10 ENCOUNTER — ANESTHESIA EVENT (OUTPATIENT)
Dept: SURGERY | Facility: HOSPITAL | Age: 50
End: 2020-12-10
Payer: COMMERCIAL

## 2020-12-10 VITALS
SYSTOLIC BLOOD PRESSURE: 102 MMHG | BODY MASS INDEX: 26.33 KG/M2 | TEMPERATURE: 97 F | HEART RATE: 61 BPM | OXYGEN SATURATION: 95 % | DIASTOLIC BLOOD PRESSURE: 65 MMHG | RESPIRATION RATE: 18 BRPM | HEIGHT: 66 IN | WEIGHT: 163.81 LBS

## 2020-12-10 DIAGNOSIS — C50.412 MALIGNANT NEOPLASM OF UPPER-OUTER QUADRANT OF LEFT BREAST IN FEMALE, ESTROGEN RECEPTOR POSITIVE (HCC): Primary | ICD-10-CM

## 2020-12-10 DIAGNOSIS — Z17.0 MALIGNANT NEOPLASM OF UPPER-OUTER QUADRANT OF LEFT BREAST IN FEMALE, ESTROGEN RECEPTOR POSITIVE (HCC): Primary | ICD-10-CM

## 2020-12-10 PROCEDURE — 05PYX3Z REMOVAL OF INFUSION DEVICE FROM UPPER VEIN, EXTERNAL APPROACH: ICD-10-PCS | Performed by: SURGERY

## 2020-12-10 PROCEDURE — 81025 URINE PREGNANCY TEST: CPT | Performed by: SURGERY

## 2020-12-10 PROCEDURE — 0JPT0WZ REMOVAL OF TOTALLY IMPLANTABLE VASCULAR ACCESS DEVICE FROM TRUNK SUBCUTANEOUS TISSUE AND FASCIA, OPEN APPROACH: ICD-10-PCS | Performed by: SURGERY

## 2020-12-10 RX ORDER — ACETAMINOPHEN 500 MG
1000 TABLET ORAL ONCE
COMMUNITY
End: 2021-02-18

## 2020-12-10 RX ORDER — CEFAZOLIN SODIUM/WATER 2 G/20 ML
2 SYRINGE (ML) INTRAVENOUS ONCE
Status: COMPLETED | OUTPATIENT
Start: 2020-12-10 | End: 2020-12-10

## 2020-12-10 RX ORDER — HYDROCODONE BITARTRATE AND ACETAMINOPHEN 5; 325 MG/1; MG/1
2 TABLET ORAL AS NEEDED
Status: CANCELLED | OUTPATIENT
Start: 2020-12-10

## 2020-12-10 RX ORDER — ONDANSETRON 2 MG/ML
4 INJECTION INTRAMUSCULAR; INTRAVENOUS AS NEEDED
Status: CANCELLED | OUTPATIENT
Start: 2020-12-10 | End: 2020-12-10

## 2020-12-10 RX ORDER — MIDAZOLAM HYDROCHLORIDE 1 MG/ML
INJECTION INTRAMUSCULAR; INTRAVENOUS AS NEEDED
Status: DISCONTINUED | OUTPATIENT
Start: 2020-12-10 | End: 2020-12-10 | Stop reason: SURG

## 2020-12-10 RX ORDER — CEFAZOLIN SODIUM/WATER 2 G/20 ML
SYRINGE (ML) INTRAVENOUS
Status: DISCONTINUED
Start: 2020-12-10 | End: 2020-12-10

## 2020-12-10 RX ORDER — TRAMADOL HYDROCHLORIDE 50 MG/1
50 TABLET ORAL AS NEEDED
Qty: 10 TABLET | Refills: 0 | Status: SHIPPED | OUTPATIENT
Start: 2020-12-10 | End: 2021-02-04

## 2020-12-10 RX ORDER — BUPIVACAINE HYDROCHLORIDE AND EPINEPHRINE 5; 5 MG/ML; UG/ML
INJECTION, SOLUTION EPIDURAL; INTRACAUDAL; PERINEURAL AS NEEDED
Status: DISCONTINUED | OUTPATIENT
Start: 2020-12-10 | End: 2020-12-10 | Stop reason: HOSPADM

## 2020-12-10 RX ORDER — KETOROLAC TROMETHAMINE 30 MG/ML
15 INJECTION, SOLUTION INTRAMUSCULAR; INTRAVENOUS EVERY 6 HOURS PRN
Status: CANCELLED | OUTPATIENT
Start: 2020-12-10 | End: 2020-12-12

## 2020-12-10 RX ORDER — LIDOCAINE HYDROCHLORIDE 10 MG/ML
INJECTION, SOLUTION INFILTRATION; PERINEURAL AS NEEDED
Status: DISCONTINUED | OUTPATIENT
Start: 2020-12-10 | End: 2020-12-10 | Stop reason: HOSPADM

## 2020-12-10 RX ORDER — SODIUM CHLORIDE, SODIUM LACTATE, POTASSIUM CHLORIDE, CALCIUM CHLORIDE 600; 310; 30; 20 MG/100ML; MG/100ML; MG/100ML; MG/100ML
INJECTION, SOLUTION INTRAVENOUS CONTINUOUS
Status: DISCONTINUED | OUTPATIENT
Start: 2020-12-10 | End: 2020-12-10

## 2020-12-10 RX ORDER — NALOXONE HYDROCHLORIDE 0.4 MG/ML
80 INJECTION, SOLUTION INTRAMUSCULAR; INTRAVENOUS; SUBCUTANEOUS AS NEEDED
Status: CANCELLED | OUTPATIENT
Start: 2020-12-10 | End: 2020-12-10

## 2020-12-10 RX ORDER — KETOROLAC TROMETHAMINE 30 MG/ML
30 INJECTION, SOLUTION INTRAMUSCULAR; INTRAVENOUS EVERY 6 HOURS PRN
Status: CANCELLED | OUTPATIENT
Start: 2020-12-10 | End: 2020-12-12

## 2020-12-10 RX ORDER — SODIUM CHLORIDE, SODIUM LACTATE, POTASSIUM CHLORIDE, CALCIUM CHLORIDE 600; 310; 30; 20 MG/100ML; MG/100ML; MG/100ML; MG/100ML
INJECTION, SOLUTION INTRAVENOUS CONTINUOUS
Status: CANCELLED | OUTPATIENT
Start: 2020-12-10

## 2020-12-10 RX ORDER — HYDROCODONE BITARTRATE AND ACETAMINOPHEN 5; 325 MG/1; MG/1
1 TABLET ORAL AS NEEDED
Status: CANCELLED | OUTPATIENT
Start: 2020-12-10

## 2020-12-10 RX ADMIN — MIDAZOLAM HYDROCHLORIDE 2 MG: 1 INJECTION INTRAMUSCULAR; INTRAVENOUS at 10:20:00

## 2020-12-10 RX ADMIN — CEFAZOLIN SODIUM/WATER 2 G: 2 G/20 ML SYRINGE (ML) INTRAVENOUS at 10:25:00

## 2020-12-10 RX ADMIN — SODIUM CHLORIDE, SODIUM LACTATE, POTASSIUM CHLORIDE, CALCIUM CHLORIDE: 600; 310; 30; 20 INJECTION, SOLUTION INTRAVENOUS at 10:53:00

## 2020-12-10 NOTE — OPERATIVE REPORT
[facility]  Op Note    Celeste Parent Location: OR   CSN [id number] MRN [id number]   Admission Date 12/10/2020 Operation Date 12/10/2020   Attending Physician Parish Siu MD Operating Physician Heriberto Garcia MD     DATE OF OPERATION: was then used to obtain final hemostasis, and the incision was closed in 2 layers using a 3-0 Vicryl on the deep layer and a 5-0 Vicryl on the subcuticular skin. Mastisol and Steri-Strips were placed across the incision as well as a sterile dressing.  The p

## 2020-12-10 NOTE — H&P
History & Physical Examination    Patient Name: Javier Colbert  MRN: [id number]  [facility]: [id number]  YOB: 1970    Diagnosis: Left breast cancer    Present Illness:  The patient is a 52-year-old female who underwent bilateral mastectomy and l Parish Siu MD at [facility] MAIN OR   •       x2   • CATHETER INSERTION PORT-A-CATH Right 10/10/2019    Performed by Parish Siu MD at [address]   • COLONOSCOPY N/A 2016    Performed by Aniceto Bañuelos MD at [facility] ENDOSCOPY   • ESO

## 2020-12-10 NOTE — ANESTHESIA POSTPROCEDURE EVALUATION
[facility] Patient Status:  Hospital Outpatient Surgery   Age/Gender 53year old female MRN [id number]   Location [address] [place] [address] Attending Marylin Vargas MD   Hosp Day # 0 PCP Antelmo Nuno MD

## 2020-12-10 NOTE — ANESTHESIA PREPROCEDURE EVALUATION
PRE-OP EVALUATION    Patient Name: Lakia Franco    Pre-op Diagnosis: Malignant neoplasm of upper-outer quadrant of left breast in female, estrogen receptor positive [facility] [C50.412, Z17.0]    Procedure(s):  REMOVAL OF RIGHT SIDED PORT A CATH    Surge h/o right mastectomy      Past Surgical History:   Procedure Laterality Date   • BREAST SENTINEL LYMPH NODE BIOPSY Bilateral 2020    Performed by Dixie Pereira MD at [facility] MAIN OR   •       x2   • CATHETER INSERTION PORT-A-CATH Right 10/1 status verified and patient meets guidelines. Post-procedure pain management plan discussed with surgeon and patient. Comment: Plan for MAC with GA as backup.  Risks discussed include recall, aspiration, dental injury, sore throat, hemodynamic instabi

## 2020-12-24 NOTE — PROGRESS NOTES
[facility] Progress Note    Patient Name: Zen Lacy   YOB: 1970   Medical Record Number: [id number]   CSN: [id number]   Date of visit: 12/28/2020  Provider: CHERYL Bose  Referring Physician: No ref.  provider found docetaxel, carboplatin, trastuzumab, and pertuzumab (TCHP). Cycle 1 was complicated by diarrhea and transient GERD. Cycle 2 was started on 04/83/2217 and was complicated by diarrhea.  Cycle 3 was started on 22/53/1732 and was complicated by less diarrhea as 9138)        Medications:    Current Outpatient Medications:   •  acetaminophen 500 MG Oral Tab, Take 1,000 mg by mouth one time. , Disp: , Rfl:   •  traMADol HCl 50 MG Oral Tab, Take 1 tablet (50 mg total) by mouth as needed for Pain.  1 tablet by mouth nikki Monocytes Absolute      0.10 - 1.00 x10(3) uL 0.30   Eosinophils Absolute      0.00 - 0.70 x10(3) uL 0.11   Basophils Absolute      0.00 - 0.20 x10(3) uL 0.03   Immature Granulocyte Absolute      0.00 - 1.00 x10(3) uL 0.01   Neutrophils %      % 55.9   L

## 2020-12-28 ENCOUNTER — OFFICE VISIT (OUTPATIENT)
Dept: HEMATOLOGY/ONCOLOGY | Facility: HOSPITAL | Age: 50
End: 2020-12-28
Attending: SPECIALIST
Payer: COMMERCIAL

## 2020-12-28 VITALS
TEMPERATURE: 96 F | OXYGEN SATURATION: 95 % | RESPIRATION RATE: 16 BRPM | DIASTOLIC BLOOD PRESSURE: 74 MMHG | BODY MASS INDEX: 26.09 KG/M2 | WEIGHT: 162.38 LBS | HEART RATE: 74 BPM | SYSTOLIC BLOOD PRESSURE: 118 MMHG | HEIGHT: 65.98 IN

## 2020-12-28 DIAGNOSIS — C50.412 MALIGNANT NEOPLASM OF UPPER-OUTER QUADRANT OF LEFT BREAST IN FEMALE, ESTROGEN RECEPTOR POSITIVE (HCC): Primary | ICD-10-CM

## 2020-12-28 DIAGNOSIS — Z17.0 MALIGNANT NEOPLASM OF UPPER-OUTER QUADRANT OF LEFT BREAST IN FEMALE, ESTROGEN RECEPTOR POSITIVE (HCC): Primary | ICD-10-CM

## 2020-12-28 DIAGNOSIS — J31.0 RHINITIS, UNSPECIFIED TYPE: ICD-10-CM

## 2020-12-28 PROCEDURE — 85025 COMPLETE CBC W/AUTO DIFF WBC: CPT

## 2020-12-28 PROCEDURE — 96375 TX/PRO/DX INJ NEW DRUG ADDON: CPT

## 2020-12-28 PROCEDURE — 80053 COMPREHEN METABOLIC PANEL: CPT

## 2020-12-28 PROCEDURE — 96413 CHEMO IV INFUSION 1 HR: CPT

## 2020-12-28 PROCEDURE — 99214 OFFICE O/P EST MOD 30 MIN: CPT | Performed by: CLINICAL NURSE SPECIALIST

## 2020-12-28 RX ORDER — FLUTICASONE PROPIONATE 50 MCG
2 SPRAY, SUSPENSION (ML) NASAL DAILY
Qty: 16 G | Refills: 0 | Status: SHIPPED | OUTPATIENT
Start: 2020-12-28 | End: 2021-01-07

## 2020-12-28 NOTE — PROGRESS NOTES
Pt here for C 14 D 1 Kadcyla.   Arrives Ambulating independently, accompanied by Self           Patient reports possible pregnancy since last therapy cycle: No    Modifications in dose or schedule: No     Frequency of blood return and site check throughout

## 2020-12-28 NOTE — PROGRESS NOTES
Patient presents with: Follow - Up: apn assessment prior to treatment    D1 C4 Kaycyla- Patient here apn assessment prior to treatment denies any N/V/D/C at this time appetite is good. No neuropathy at this time and denies pain.     Education Record    Nikolas Jones

## 2021-02-04 ENCOUNTER — OFFICE VISIT (OUTPATIENT)
Dept: HEMATOLOGY/ONCOLOGY | Facility: HOSPITAL | Age: 51
End: 2021-02-04
Attending: SPECIALIST
Payer: COMMERCIAL

## 2021-02-04 DIAGNOSIS — Z85.3 PERSONAL HISTORY OF BREAST CANCER: Primary | ICD-10-CM

## 2021-02-04 DIAGNOSIS — Z08 ENCOUNTER FOR FOLLOW-UP EXAMINATION AFTER COMPLETED TREATMENT FOR MALIGNANT NEOPLASM: ICD-10-CM

## 2021-02-04 DIAGNOSIS — Z71.9 COUNSELING, UNSPECIFIED: ICD-10-CM

## 2021-02-04 PROCEDURE — 99215 OFFICE O/P EST HI 40 MIN: CPT | Performed by: NURSE PRACTITIONER

## 2021-02-04 NOTE — PROGRESS NOTES
I met with Braxton Kohli for a Survivorship Clinic visit to provide a survivorship care plan (SCP) and information related to post-treatment care. She has a diagnosis of triple positive left breast cancer.   She was treated with neoadjuvant chemo with anti-HE recommended for age and gender including cancer screening tests. She was encouraged to continue to see specialists at usual intervals. Reviewed concerning symptoms that she should report to any Provider.       Reviewed possible late and long-term effec includes: education, support groups, special programs, nutrition and exercise classes, movement classes, mind/body programs, survivorship programs, individual counseling  -Cosmetic resources, knitted knockers  -ChooseMyPlate.gov handout-nutrition, physical

## 2021-02-14 NOTE — PROGRESS NOTES
Patient is here today for follow up with Venessa Easley for Breast Cancer C8D1 Nancy Antonio (trastuzumab) and Hoang. Patient denies pain. Medication list, medical history and toxicites were reviewed and updated.      Education Record    Learner:  Patient    Gt Solano Name band;

## 2021-03-16 NOTE — PROGRESS NOTES
[facility] Hematology Oncology Group Progress Note      Patient Name: Pk Vaca   YOB: 1970  Medical Record Number: [id number]  Attending Physician: Candelaria Yuen. Desiree Brasher M.D.      Date of Visit: 3/22/2021      Chief Complaint  Invasive ductal 74/24/7220 and was complicated by diarrhea and superficial fungal infection. Cycle 5 was started on 29/85/0612 and was complicated by diarrhea and superficial fungal infection. Cycle 6 was started on 77/37/4103 and was complicated by diarrhea.      On 02/17 Take 274 mcg by mouth daily. , Disp: 180 tablet, Rfl: 1  Tamoxifen Citrate 20 MG Oral Tab, Take 1 tablet (20 mg total) by mouth daily. , Disp: 90 tablet, Rfl: 3      Allergies   Ms. Karen Christine is allergic to codeine [opioid analgesics].     Vital Signs   Height: 16 mmol/L    Potassium 4.20 3.50 - 5.10 mmol/L    Chloride 103 98 - 107 mmol/L    Carbon Dioxide 29.3 (H) 22.0 - 29.0 mmol/L    Calcium 9.6 8.6 - 10.3 mg/dL    Total Protein 7.5 6.4 - 8.3 g/dL    Albumin 4.5 3.5 - 5.2 g/dL    Bilirubin, Total 0.33 0.00 - 1.20

## 2021-03-22 ENCOUNTER — OFFICE VISIT (OUTPATIENT)
Dept: HEMATOLOGY/ONCOLOGY | Facility: HOSPITAL | Age: 51
End: 2021-03-22
Attending: SPECIALIST
Payer: COMMERCIAL

## 2021-03-22 VITALS
RESPIRATION RATE: 16 BRPM | HEIGHT: 65.98 IN | DIASTOLIC BLOOD PRESSURE: 75 MMHG | TEMPERATURE: 98 F | SYSTOLIC BLOOD PRESSURE: 118 MMHG | OXYGEN SATURATION: 98 % | BODY MASS INDEX: 26.68 KG/M2 | HEART RATE: 74 BPM | WEIGHT: 166 LBS

## 2021-03-22 DIAGNOSIS — Z17.0 MALIGNANT NEOPLASM OF UPPER-OUTER QUADRANT OF LEFT BREAST IN FEMALE, ESTROGEN RECEPTOR POSITIVE (HCC): ICD-10-CM

## 2021-03-22 DIAGNOSIS — C50.412 MALIGNANT NEOPLASM OF UPPER-OUTER QUADRANT OF LEFT BREAST IN FEMALE, ESTROGEN RECEPTOR POSITIVE (HCC): ICD-10-CM

## 2021-03-22 DIAGNOSIS — T45.1X5A CHEMOTHERAPY-INDUCED THROMBOCYTOPENIA: ICD-10-CM

## 2021-03-22 DIAGNOSIS — D69.59 CHEMOTHERAPY-INDUCED THROMBOCYTOPENIA: ICD-10-CM

## 2021-03-22 DIAGNOSIS — F32.A DEPRESSION, UNSPECIFIED DEPRESSION TYPE: Primary | ICD-10-CM

## 2021-03-22 PROCEDURE — 99214 OFFICE O/P EST MOD 30 MIN: CPT | Performed by: SPECIALIST

## 2021-03-22 NOTE — PROGRESS NOTES
Patient is here today for follow up with Venessa Easley for Breast Cancer. Currently on Tamoxifen therapy. Stated she is feeling good. Patient denies pain. Medication list and medical history were reviewed and updated.      Education Record    Learner:  Patient

## 2021-04-23 ENCOUNTER — OFFICE VISIT (OUTPATIENT)
Dept: SURGERY | Facility: CLINIC | Age: 51
End: 2021-04-23
Payer: COMMERCIAL

## 2021-04-23 VITALS — HEART RATE: 73 BPM | DIASTOLIC BLOOD PRESSURE: 72 MMHG | TEMPERATURE: 98 F | SYSTOLIC BLOOD PRESSURE: 109 MMHG

## 2021-04-23 DIAGNOSIS — Z17.0 MALIGNANT NEOPLASM OF UPPER-OUTER QUADRANT OF LEFT BREAST IN FEMALE, ESTROGEN RECEPTOR POSITIVE (HCC): Primary | ICD-10-CM

## 2021-04-23 DIAGNOSIS — C50.412 MALIGNANT NEOPLASM OF UPPER-OUTER QUADRANT OF LEFT BREAST IN FEMALE, ESTROGEN RECEPTOR POSITIVE (HCC): Primary | ICD-10-CM

## 2021-04-23 PROCEDURE — 99213 OFFICE O/P EST LOW 20 MIN: CPT | Performed by: SURGERY

## 2021-04-23 PROCEDURE — 3074F SYST BP LT 130 MM HG: CPT | Performed by: SURGERY

## 2021-04-23 PROCEDURE — 3078F DIAST BP <80 MM HG: CPT | Performed by: SURGERY

## 2021-04-23 NOTE — PROGRESS NOTES
Follow Up Visit Note       Active Problems      1.  Malignant neoplasm of upper-outer quadrant of left breast in female, estrogen receptor positive [facility])          Chief Complaint   Patient presents with:  Breast Follow-up: breast f/u s/p 2/20/20 Bilateral m Depression/anxiety   • Other (Hypothyroidism) Daughter    • Other (Hypothyroidism) Daughter    • Cancer Maternal Grandfather 54        Colon CA     Social History    Socioeconomic History      Marital status:       Spouse name: Not on file Findings   /72   Pulse 73   Temp 97.5 °F (36.4 °C) (Temporal)   Physical Exam  Vitals and nursing note reviewed. Constitutional:       General: She is not in acute distress. Appearance: She is well-developed. She is not diaphoretic.    HENT:

## 2021-04-26 RX ORDER — TAMOXIFEN CITRATE 20 MG/1
TABLET ORAL
Qty: 90 TABLET | Refills: 0 | Status: SHIPPED | OUTPATIENT
Start: 2021-04-26 | End: 2021-07-19

## 2021-06-20 NOTE — PROGRESS NOTES
[facility] Hematology Oncology Group Progress Note      Patient Name: Zee Fleming   YOB: 1970  Medical Record Number: [id number]  Attending Physician: Maryanne Krueger M.D.      Date of Visit: 6/21/2021      Chief Complaint  Invasive ductal 55/29/4084 and was complicated by diarrhea and superficial fungal infection. Cycle 5 was started on 20/05/7677 and was complicated by diarrhea and superficial fungal infection. Cycle 6 was started on 00/04/3464 and was complicated by diarrhea.      On 02/17 Tab, Take 1 tablet (100 mg total) by mouth once daily. , Disp: 90 tablet, Rfl: 3  Levothyroxine Sodium 137 MCG Oral Tab, Take 274 mcg by mouth daily. , Disp: 180 tablet, Rfl: 1      Allergies   Ms. Daphne Fam is allergic to codeine [opioid analgesics].     Vital Si Bilirubin, Total 0.3 0.1 - 2.0 mg/dL    Total Protein 7.4 6.4 - 8.2 g/dL    Albumin 3.9 3.4 - 5.0 g/dL    Globulin  3.5 2.8 - 4.4 g/dL    A/G Ratio 1.1 1.0 - 2.0    FASTING No    CBC W/ DIFFERENTIAL    Collection Time: 06/21/21 10:06 AM   Result Value Ref

## 2021-06-21 ENCOUNTER — OFFICE VISIT (OUTPATIENT)
Dept: HEMATOLOGY/ONCOLOGY | Facility: HOSPITAL | Age: 51
End: 2021-06-21
Attending: SPECIALIST
Payer: COMMERCIAL

## 2021-06-21 VITALS
WEIGHT: 167.63 LBS | HEART RATE: 73 BPM | OXYGEN SATURATION: 96 % | SYSTOLIC BLOOD PRESSURE: 110 MMHG | TEMPERATURE: 98 F | RESPIRATION RATE: 16 BRPM | BODY MASS INDEX: 26.94 KG/M2 | HEIGHT: 65.98 IN | DIASTOLIC BLOOD PRESSURE: 77 MMHG

## 2021-06-21 DIAGNOSIS — Z17.0 MALIGNANT NEOPLASM OF UPPER-OUTER QUADRANT OF LEFT BREAST IN FEMALE, ESTROGEN RECEPTOR POSITIVE (HCC): Primary | ICD-10-CM

## 2021-06-21 DIAGNOSIS — C50.412 MALIGNANT NEOPLASM OF UPPER-OUTER QUADRANT OF LEFT BREAST IN FEMALE, ESTROGEN RECEPTOR POSITIVE (HCC): Primary | ICD-10-CM

## 2021-06-21 DIAGNOSIS — E03.9 HYPOTHYROIDISM, UNSPECIFIED TYPE: ICD-10-CM

## 2021-06-21 DIAGNOSIS — T45.1X5A CHEMOTHERAPY-INDUCED THROMBOCYTOPENIA: ICD-10-CM

## 2021-06-21 DIAGNOSIS — D69.59 CHEMOTHERAPY-INDUCED THROMBOCYTOPENIA: ICD-10-CM

## 2021-06-21 PROCEDURE — 99214 OFFICE O/P EST MOD 30 MIN: CPT | Performed by: SPECIALIST

## 2021-06-21 NOTE — PROGRESS NOTES
Patient is here today for follow up with Markos Petersen for Breast Cancer - patient is on Tamoxifen therapy. Patient denies pain. Medication list,  medical history and toxicities were reviewed and updated.      Education Record    Learner:  Patient    Disease /

## 2021-06-28 ENCOUNTER — TELEPHONE (OUTPATIENT)
Dept: HEMATOLOGY/ONCOLOGY | Facility: HOSPITAL | Age: 51
End: 2021-06-28

## 2021-06-28 NOTE — TELEPHONE ENCOUNTER
Toxicities: Tamoxifen    Patient of Dr Trinh Favorite - [facility] Left Breast completed chemotherapy and bilateral mastectomies. Started tamoxifen 4/2020. Last menstrual cycle was late 2019. Seen for 3 month follow up on 6/21/2021.  No vaginal bleeding noted at that vi

## 2021-07-19 RX ORDER — TAMOXIFEN CITRATE 20 MG/1
TABLET ORAL
Qty: 90 TABLET | Refills: 0 | Status: SHIPPED | OUTPATIENT
Start: 2021-07-19 | End: 2021-10-11

## 2021-09-18 NOTE — PROGRESS NOTES
[facility] Hematology Oncology Group Progress Note      Patient Name: Eduar Dubon   YOB: 1970  Medical Record Number: [id number]  Attending Physician: Israel Dial. Jim See M.D.      Date of Visit: 9/20/2021      Chief Complaint  Invasive ductal 75/80/1749 and was complicated by diarrhea and superficial fungal infection. Cycle 5 was started on 74/25/5089 and was complicated by diarrhea and superficial fungal infection. Cycle 6 was started on 92/87/9171 and was complicated by diarrhea.      On 02/17 Oral Tab, Take 2 tablets (250 mcg total) by mouth daily. , Disp: 180 tablet, Rfl: 3  Sertraline HCl 100 MG Oral Tab, Take 1 tablet (100 mg total) by mouth once daily. , Disp: 90 tablet, Rfl: 3    Allergies   Ms. Tawanna Francois is allergic to codeine [opioid analgesics started adjuvant Kadcyla on 03/30/2020. In mid 04/2020 she began adjuvant therapy with tamoxifen. Continue tamoxifen without modification. FSH, LH, and estradiol numbers are not inconsistent with premenopausal state.  Patient advised to notify me i

## 2021-09-20 ENCOUNTER — OFFICE VISIT (OUTPATIENT)
Dept: HEMATOLOGY/ONCOLOGY | Facility: HOSPITAL | Age: 51
End: 2021-09-20
Attending: SPECIALIST
Payer: COMMERCIAL

## 2021-09-20 VITALS
DIASTOLIC BLOOD PRESSURE: 71 MMHG | TEMPERATURE: 98 F | SYSTOLIC BLOOD PRESSURE: 107 MMHG | OXYGEN SATURATION: 97 % | BODY MASS INDEX: 27 KG/M2 | HEART RATE: 71 BPM | HEIGHT: 65.98 IN | RESPIRATION RATE: 16 BRPM | WEIGHT: 168 LBS

## 2021-09-20 DIAGNOSIS — Z79.810 LONG-TERM CURRENT USE OF TAMOXIFEN: ICD-10-CM

## 2021-09-20 DIAGNOSIS — Z17.0 MALIGNANT NEOPLASM OF UPPER-OUTER QUADRANT OF LEFT BREAST IN FEMALE, ESTROGEN RECEPTOR POSITIVE (HCC): ICD-10-CM

## 2021-09-20 DIAGNOSIS — C50.412 MALIGNANT NEOPLASM OF UPPER-OUTER QUADRANT OF LEFT BREAST IN FEMALE, ESTROGEN RECEPTOR POSITIVE (HCC): ICD-10-CM

## 2021-09-20 DIAGNOSIS — N91.2 AMENORRHEA: Primary | ICD-10-CM

## 2021-09-20 LAB
ESTRADIOL SERPL-MCNC: 18.9 PG/ML
FSH SERPL-ACNC: 16.7 MIU/ML
LH SERPL-ACNC: 6.9 MIU/ML

## 2021-09-20 PROCEDURE — 99214 OFFICE O/P EST MOD 30 MIN: CPT | Performed by: SPECIALIST

## 2021-09-20 NOTE — PROGRESS NOTES
Patient is here today for 3 month follow up with Yannick Rubio for Breast Cancer. Currently on Tamoxifen therapy. Patient denies pain. Stated had her first menstrual cycle in 20 months - started on September 15th.   Medication list and medical history were re

## 2021-10-11 RX ORDER — TAMOXIFEN CITRATE 20 MG/1
TABLET ORAL
Qty: 90 TABLET | Refills: 0 | Status: SHIPPED | OUTPATIENT
Start: 2021-10-11 | End: 2022-01-14

## 2021-10-25 ENCOUNTER — OFFICE VISIT (OUTPATIENT)
Dept: SURGERY | Facility: CLINIC | Age: 51
End: 2021-10-25
Payer: COMMERCIAL

## 2021-10-25 VITALS
SYSTOLIC BLOOD PRESSURE: 108 MMHG | HEART RATE: 72 BPM | HEIGHT: 66 IN | TEMPERATURE: 97 F | WEIGHT: 168 LBS | DIASTOLIC BLOOD PRESSURE: 73 MMHG | BODY MASS INDEX: 27 KG/M2

## 2021-10-25 DIAGNOSIS — C50.412 MALIGNANT NEOPLASM OF UPPER-OUTER QUADRANT OF LEFT BREAST IN FEMALE, ESTROGEN RECEPTOR POSITIVE (HCC): Primary | ICD-10-CM

## 2021-10-25 DIAGNOSIS — Z17.0 MALIGNANT NEOPLASM OF UPPER-OUTER QUADRANT OF LEFT BREAST IN FEMALE, ESTROGEN RECEPTOR POSITIVE (HCC): Primary | ICD-10-CM

## 2021-10-25 PROCEDURE — 3074F SYST BP LT 130 MM HG: CPT | Performed by: SURGERY

## 2021-10-25 PROCEDURE — 3008F BODY MASS INDEX DOCD: CPT | Performed by: SURGERY

## 2021-10-25 PROCEDURE — 3078F DIAST BP <80 MM HG: CPT | Performed by: SURGERY

## 2021-10-25 PROCEDURE — 99213 OFFICE O/P EST LOW 20 MIN: CPT | Performed by: SURGERY

## 2021-10-25 NOTE — PROGRESS NOTES
Follow Up Visit Note       Active Problems      1.  Malignant neoplasm of upper-outer quadrant of left breast in female, estrogen receptor positive [facility])          Chief Complaint   Patient presents with:  Breast Problem: 6mo breast f/u- PT. states no new co (Hypothyroidism) Mother    • Psychiatric Daughter 16        Depression/anxiety   • Other (Hypothyroidism) Daughter    • Other (Hypothyroidism) Daughter    • Cancer Maternal Grandfather 54        Colon CA     Social History    Socioeconomic History      Mar kg/m²   Physical Exam  Vitals and nursing note reviewed. Constitutional:       General: She is not in acute distress. Appearance: She is well-developed. She is not diaphoretic. HENT:      Head: Normocephalic and atraumatic.    Eyes:      General: No

## 2021-11-29 ENCOUNTER — TELEPHONE (OUTPATIENT)
Dept: HEMATOLOGY/ONCOLOGY | Facility: HOSPITAL | Age: 51
End: 2021-11-29

## 2021-11-29 NOTE — TELEPHONE ENCOUNTER
Lamond Skiff called. She states that she had blood in her stool this morning and would like to speak with a triage nurse. Please call.

## 2021-11-29 NOTE — TELEPHONE ENCOUNTER
Toxicities: Tamoxifen Citrate    Blood in Stool: Sincere Castanon reports she passed a \"hard\" stool this morning. She saw dark red blood in the toilet water. No clots. After wiping three times the bleeding stopped.  She reports she did have a hemorrhoidectomy i

## 2021-12-13 NOTE — PROGRESS NOTES
FEEDING:  Milk - 24 oz./day of 2%    Diet - a picky eater  SLEEPING: 10 hours at night, occasional 1-2 hour nap  URINATION: 1 month ago c/o milky white vaginal discharge. Labia more sensitive than usual. Not a good wiper.   STOOLS: abnormal constipation, BM qod to every 2 days. \"softball sized stools\"  CONCERNS:  None  Health Maintenance Summary     Topic Due On Due Status Completed On Postpone Until Reason    IMMUNIZATION - IPV Aug 23, 2018 Not Due Feb 19, 2016      IMMUNIZATION - MMR Aug 23, 2018 Not Due Sep 3, 2015      IMMUNIZATION - VARICELLA Aug 23, 2018 Not Due Sep 3, 2015      IMMUNIZATION - PNEUMOCOCCAL  Completed Sep 3, 2015      IMMUNIZATION - HEPATITIS B  Completed May 28, 2015      IMMUNIZATION - HIB  Completed Feb 19, 2016      IMMUNIZATION - ROTAVIRUS  Completed Mar 12, 2015      IMMUNIZATION - HEPATITIS A  Completed Sep 8, 2016      IMMUNIZATION - MENINGITIS Aug 23, 2025 Not Due       IMMUNIZATION - DTaP/Tdap/Td Aug 23, 2018 Not Due Feb 19, 2016      Immunization-Influenza Sep 1, 2017 Postponed Sep 8, 2016 Sep 28, 2017 Patient Refused          Patient is due for topics as listed above, she wishes to decline at this time . States will get FLu vaccine when she returns from vacation.        [facility] Hematology Oncology Group Progress Note      Patient Name: Anastasia Baker   YOB: 1970  Medical Record Number: [id number]  Attending Physician: Jenny Leon. Tequila Monsalve M.D.      Date of Visit: 12/14/2021      Chief Complaint  Invasive ductal 90/30/1271 and was complicated by diarrhea and superficial fungal infection. Cycle 5 was started on 36/41/9095 and was complicated by diarrhea and superficial fungal infection. Cycle 6 was started on 32/64/1372 and was complicated by diarrhea.      On 02/17 Take 2 tablets (224 mcg total) by mouth daily. , Disp: 180 tablet, Rfl: 0  TAMOXIFEN 20 MG Oral Tab, TAKE 1 TABLET DAILY, Disp: 90 tablet, Rfl: 0  Sertraline HCl 100 MG Oral Tab, Take 1 tablet (100 mg total) by mouth once daily. , Disp: 90 tablet, Rfl: 3 [facility] [place]

## 2021-12-14 ENCOUNTER — OFFICE VISIT (OUTPATIENT)
Dept: HEMATOLOGY/ONCOLOGY | Facility: HOSPITAL | Age: 51
End: 2021-12-14
Attending: SPECIALIST
Payer: COMMERCIAL

## 2021-12-14 VITALS
WEIGHT: 167 LBS | HEIGHT: 65.98 IN | OXYGEN SATURATION: 99 % | RESPIRATION RATE: 16 BRPM | TEMPERATURE: 98 F | SYSTOLIC BLOOD PRESSURE: 119 MMHG | HEART RATE: 66 BPM | BODY MASS INDEX: 26.84 KG/M2 | DIASTOLIC BLOOD PRESSURE: 78 MMHG

## 2021-12-14 DIAGNOSIS — Z17.0 MALIGNANT NEOPLASM OF UPPER-OUTER QUADRANT OF LEFT BREAST IN FEMALE, ESTROGEN RECEPTOR POSITIVE (HCC): Primary | ICD-10-CM

## 2021-12-14 DIAGNOSIS — C50.412 MALIGNANT NEOPLASM OF UPPER-OUTER QUADRANT OF LEFT BREAST IN FEMALE, ESTROGEN RECEPTOR POSITIVE (HCC): Primary | ICD-10-CM

## 2021-12-14 DIAGNOSIS — Z79.810 LONG-TERM CURRENT USE OF TAMOXIFEN: ICD-10-CM

## 2021-12-14 PROCEDURE — 99214 OFFICE O/P EST MOD 30 MIN: CPT | Performed by: SPECIALIST

## 2021-12-14 NOTE — PROGRESS NOTES
Patient is here today for 3 month follow up with Eliza Bosch for Breast cancer. Patient denies pain. Currently on Tamoxifen therapy - stated she is feeling good. Medication list and medical history were reviewed and updated.      Education Record    Learner:

## 2021-12-20 ENCOUNTER — APPOINTMENT (OUTPATIENT)
Dept: HEMATOLOGY/ONCOLOGY | Facility: HOSPITAL | Age: 51
End: 2021-12-20
Attending: SPECIALIST
Payer: COMMERCIAL

## 2022-01-14 RX ORDER — TAMOXIFEN CITRATE 20 MG/1
TABLET ORAL
Qty: 90 TABLET | Refills: 0 | Status: SHIPPED | OUTPATIENT
Start: 2022-01-14

## 2022-01-24 ENCOUNTER — LAB ENCOUNTER (OUTPATIENT)
Dept: LAB | Facility: HOSPITAL | Age: 52
End: 2022-01-24
Attending: INTERNAL MEDICINE
Payer: COMMERCIAL

## 2022-01-24 DIAGNOSIS — Z01.818 PRE-OP TESTING: ICD-10-CM

## 2022-01-26 LAB — SARS-COV-2 RNA RESP QL NAA+PROBE: NOT DETECTED

## 2022-01-27 ENCOUNTER — ANESTHESIA EVENT (OUTPATIENT)
Dept: ENDOSCOPY | Facility: HOSPITAL | Age: 52
End: 2022-01-27
Payer: COMMERCIAL

## 2022-01-27 ENCOUNTER — HOSPITAL ENCOUNTER (OUTPATIENT)
Facility: HOSPITAL | Age: 52
Setting detail: HOSPITAL OUTPATIENT SURGERY
Discharge: HOME | End: 2022-01-27
Attending: INTERNAL MEDICINE | Admitting: INTERNAL MEDICINE
Payer: COMMERCIAL

## 2022-01-27 ENCOUNTER — ANESTHESIA (OUTPATIENT)
Dept: ENDOSCOPY | Facility: HOSPITAL | Age: 52
End: 2022-01-27
Payer: COMMERCIAL

## 2022-01-27 VITALS
WEIGHT: 165 LBS | HEIGHT: 66 IN | RESPIRATION RATE: 16 BRPM | DIASTOLIC BLOOD PRESSURE: 65 MMHG | SYSTOLIC BLOOD PRESSURE: 124 MMHG | OXYGEN SATURATION: 98 % | HEART RATE: 64 BPM | BODY MASS INDEX: 26.52 KG/M2

## 2022-01-27 DIAGNOSIS — Z01.818 PRE-OP TESTING: ICD-10-CM

## 2022-01-27 DIAGNOSIS — Z86.010 PERSONAL HISTORY OF COLONIC POLYPS: ICD-10-CM

## 2022-01-27 DIAGNOSIS — K62.89 HYPERTROPHIED ANAL PAPILLA: ICD-10-CM

## 2022-01-27 DIAGNOSIS — K63.5 CECAL POLYP: Primary | ICD-10-CM

## 2022-01-27 DIAGNOSIS — K62.5 RECTAL BLEEDING: ICD-10-CM

## 2022-01-27 DIAGNOSIS — K64.9 HEMORRHOIDS: ICD-10-CM

## 2022-01-27 PROCEDURE — 0DBH8ZX EXCISION OF CECUM, VIA NATURAL OR ARTIFICIAL OPENING ENDOSCOPIC, DIAGNOSTIC: ICD-10-PCS | Performed by: INTERNAL MEDICINE

## 2022-01-27 PROCEDURE — 88305 TISSUE EXAM BY PATHOLOGIST: CPT | Performed by: INTERNAL MEDICINE

## 2022-01-27 RX ORDER — LIDOCAINE HYDROCHLORIDE 10 MG/ML
INJECTION, SOLUTION EPIDURAL; INFILTRATION; INTRACAUDAL; PERINEURAL AS NEEDED
Status: DISCONTINUED | OUTPATIENT
Start: 2022-01-27 | End: 2022-01-27 | Stop reason: SURG

## 2022-01-27 RX ORDER — SODIUM CHLORIDE, SODIUM LACTATE, POTASSIUM CHLORIDE, CALCIUM CHLORIDE 600; 310; 30; 20 MG/100ML; MG/100ML; MG/100ML; MG/100ML
INJECTION, SOLUTION INTRAVENOUS CONTINUOUS
Status: DISCONTINUED | OUTPATIENT
Start: 2022-01-27 | End: 2022-01-27

## 2022-01-27 RX ORDER — MULTIVIT-MIN/IRON/FOLIC ACID/K 18-600-40
CAPSULE ORAL
COMMUNITY

## 2022-01-27 RX ADMIN — LIDOCAINE HYDROCHLORIDE 25 MG: 10 INJECTION, SOLUTION EPIDURAL; INFILTRATION; INTRACAUDAL; PERINEURAL at 09:13:00

## 2022-01-27 RX ADMIN — SODIUM CHLORIDE, SODIUM LACTATE, POTASSIUM CHLORIDE, CALCIUM CHLORIDE: 600; 310; 30; 20 INJECTION, SOLUTION INTRAVENOUS at 09:48:00

## 2022-01-27 NOTE — H&P
[facility] [address] [place] [address] Department of  Gastroenterology  Update Health History :       Telma Smiben  female   Chiqui Villasenor MD     [id number]  11/11/1970 Primary Care Physician  Sirisha Valladares MD        HPI :    Personal history of colon polyps. 112 MCG Oral Tab, Take 2 tablets (224 mcg total) by mouth daily. , Disp: 180 tablet, Rfl: 0  Sertraline HCl 100 MG Oral Tab, Take 1 tablet (100 mg total) by mouth once daily. , Disp: 90 tablet, Rfl: 3        Review of Symptoms:  A comprehensive 10 point revi

## 2022-01-27 NOTE — ANESTHESIA POSTPROCEDURE EVALUATION
Patient: Constantino Gandhi    Procedure Summary     Date: 01/27/22 Room / Location: [address] [place] [address] ENDOSCOPY 01 / [address] [place] [address] ENDOSCOPY    Anesthesia Start: 3816 Anesthesia Stop:     Procedure: COLONOSCOPY (N/A ) Diagnosis:       Personal history of colonic polyps      Rec

## 2022-01-27 NOTE — OPERATIVE REPORT
[id number]  Arleen Clamp  11/11/1970 1/27/2022      Preoperative Diagnosis: Personal history of colon polyps. Status post hemorrhoidectomy few months ago.   Last colonoscopy 2019  Postoperative Diagnosis: Small internal hemorrhoids, anal papilla, a unremarkable. Scars from prior polypectomy in the ascending colon and the transverse colon were seen and appeared to be well-healed without obvious recurrence.     Retroflexion examination of the ascending colon was performed along with multiple passes t

## 2022-01-27 NOTE — ANESTHESIA PREPROCEDURE EVALUATION
Anesthesia PreOp Note    HPI:     Sophie Duverney is a 49year old female who presents for preoperative consultation requested by: Yodit Mcnamara MD    Date of Surgery: 1/27/2022    Procedure(s):  COLONOSCOPY  Indication: Personal history of colonic (2000 UT) Oral Cap, Take by mouth., Disp: , Rfl: , 1/25/2022  TAMOXIFEN 20 MG Oral Tab, TAKE 1 TABLET DAILY, Disp: 90 tablet, Rfl: 0, 1/26/2022  levothyroxine 112 MCG Oral Tab, Take 2 tablets (224 mcg total) by mouth daily. , Disp: 180 tablet, Rfl: 0, 1/26/ file  Intimate Partner Violence: Not on file  Housing Stability: Not on file    Available pre-op labs reviewed.   Lab Results   Component Value Date    WBC 4.56 12/01/2021    RBC 4.88 12/01/2021    HGB 13.2 12/01/2021    HCT 41.1 12/01/2021    MCV 84.2 12/0 All of the patient's questions were answered to the best of my ability. The patient desires the anesthetic management as planned. Aleida Al CRNA  1/27/2022 9:00 AM

## 2022-03-21 ENCOUNTER — OFFICE VISIT (OUTPATIENT)
Dept: HEMATOLOGY/ONCOLOGY | Facility: HOSPITAL | Age: 52
End: 2022-03-21
Attending: SPECIALIST
Payer: COMMERCIAL

## 2022-03-21 VITALS
OXYGEN SATURATION: 97 % | RESPIRATION RATE: 16 BRPM | HEIGHT: 65.98 IN | TEMPERATURE: 98 F | HEART RATE: 71 BPM | BODY MASS INDEX: 27.16 KG/M2 | DIASTOLIC BLOOD PRESSURE: 69 MMHG | SYSTOLIC BLOOD PRESSURE: 119 MMHG | WEIGHT: 169 LBS

## 2022-03-21 DIAGNOSIS — Z17.0 MALIGNANT NEOPLASM OF UPPER-OUTER QUADRANT OF LEFT BREAST IN FEMALE, ESTROGEN RECEPTOR POSITIVE (HCC): ICD-10-CM

## 2022-03-21 DIAGNOSIS — Z79.810 LONG-TERM CURRENT USE OF TAMOXIFEN: Primary | ICD-10-CM

## 2022-03-21 DIAGNOSIS — C50.412 MALIGNANT NEOPLASM OF UPPER-OUTER QUADRANT OF LEFT BREAST IN FEMALE, ESTROGEN RECEPTOR POSITIVE (HCC): ICD-10-CM

## 2022-03-21 PROCEDURE — 99214 OFFICE O/P EST MOD 30 MIN: CPT | Performed by: SPECIALIST

## 2022-03-21 NOTE — PROGRESS NOTES
Patient is here today for 3 month follow up with [facility] for Breast Cancer. Patient is on Tamoxifen therapy. Stated hot flashes are resolving. Last menstrual cycle started Sept 15, 2021. Patient denies pain. Medication list, medical history and toxicities were reviewed and updated. Education Record    Learner:  Patient    Disease / Diagnosis: Breast Cancer     Barriers / Limitations:  None   Comments:    Method:  Brief focused, Discussion, Printed material and Reinforcement   Comments:    General Topics:  Medication, Pain, Procedure and Plan of care reviewed   Comments:    Outcome:  Shows understanding   Comments:    AVS provided and follow up reviewed. Patient instructed to call as needed.

## 2022-04-14 RX ORDER — TAMOXIFEN CITRATE 20 MG/1
TABLET ORAL
Qty: 90 TABLET | Refills: 0 | Status: SHIPPED | OUTPATIENT
Start: 2022-04-14

## 2022-04-25 ENCOUNTER — OFFICE VISIT (OUTPATIENT)
Dept: SURGERY | Facility: CLINIC | Age: 52
End: 2022-04-25
Payer: COMMERCIAL

## 2022-04-25 VITALS
BODY MASS INDEX: 26.52 KG/M2 | DIASTOLIC BLOOD PRESSURE: 70 MMHG | WEIGHT: 165 LBS | HEART RATE: 68 BPM | SYSTOLIC BLOOD PRESSURE: 122 MMHG | TEMPERATURE: 98 F | HEIGHT: 66 IN

## 2022-04-25 DIAGNOSIS — Z17.0 MALIGNANT NEOPLASM OF UPPER-OUTER QUADRANT OF LEFT BREAST IN FEMALE, ESTROGEN RECEPTOR POSITIVE (HCC): Primary | ICD-10-CM

## 2022-04-25 DIAGNOSIS — C50.412 MALIGNANT NEOPLASM OF UPPER-OUTER QUADRANT OF LEFT BREAST IN FEMALE, ESTROGEN RECEPTOR POSITIVE (HCC): Primary | ICD-10-CM

## 2022-06-20 ENCOUNTER — OFFICE VISIT (OUTPATIENT)
Dept: HEMATOLOGY/ONCOLOGY | Facility: HOSPITAL | Age: 52
End: 2022-06-20
Attending: SPECIALIST
Payer: COMMERCIAL

## 2022-06-20 VITALS
OXYGEN SATURATION: 97 % | BODY MASS INDEX: 27.22 KG/M2 | SYSTOLIC BLOOD PRESSURE: 120 MMHG | DIASTOLIC BLOOD PRESSURE: 75 MMHG | WEIGHT: 169.38 LBS | HEIGHT: 65.98 IN | TEMPERATURE: 98 F | HEART RATE: 72 BPM

## 2022-06-20 DIAGNOSIS — C50.412 MALIGNANT NEOPLASM OF UPPER-OUTER QUADRANT OF LEFT BREAST IN FEMALE, ESTROGEN RECEPTOR POSITIVE (HCC): Primary | ICD-10-CM

## 2022-06-20 DIAGNOSIS — Z17.0 MALIGNANT NEOPLASM OF UPPER-OUTER QUADRANT OF LEFT BREAST IN FEMALE, ESTROGEN RECEPTOR POSITIVE (HCC): Primary | ICD-10-CM

## 2022-06-20 DIAGNOSIS — Z79.810 LONG-TERM CURRENT USE OF TAMOXIFEN: ICD-10-CM

## 2022-06-20 LAB
ALBUMIN SERPL-MCNC: 3.9 G/DL (ref 3.4–5)
ALBUMIN/GLOB SERPL: 1 {RATIO} (ref 1–2)
ALP LIVER SERPL-CCNC: 51 U/L
ALT SERPL-CCNC: 25 U/L
ANION GAP SERPL CALC-SCNC: 5 MMOL/L (ref 0–18)
AST SERPL-CCNC: 19 U/L (ref 15–37)
BASOPHILS # BLD AUTO: 0.04 X10(3) UL (ref 0–0.2)
BASOPHILS NFR BLD AUTO: 0.6 %
BILIRUB SERPL-MCNC: 0.4 MG/DL (ref 0.1–2)
BUN BLD-MCNC: 13 MG/DL (ref 7–18)
CALCIUM BLD-MCNC: 9.3 MG/DL (ref 8.5–10.1)
CHLORIDE SERPL-SCNC: 106 MMOL/L (ref 98–112)
CO2 SERPL-SCNC: 28 MMOL/L (ref 21–32)
CREAT BLD-MCNC: 0.85 MG/DL
EOSINOPHIL # BLD AUTO: 0.1 X10(3) UL (ref 0–0.7)
EOSINOPHIL NFR BLD AUTO: 1.4 %
ERYTHROCYTE [DISTWIDTH] IN BLOOD BY AUTOMATED COUNT: 14.1 %
FASTING STATUS PATIENT QL REPORTED: NO
GLOBULIN PLAS-MCNC: 3.8 G/DL (ref 2.8–4.4)
GLUCOSE BLD-MCNC: 102 MG/DL (ref 70–99)
HCT VFR BLD AUTO: 40 %
HGB BLD-MCNC: 12.7 G/DL
IMM GRANULOCYTES # BLD AUTO: 0.02 X10(3) UL (ref 0–1)
IMM GRANULOCYTES NFR BLD: 0.3 %
LYMPHOCYTES # BLD AUTO: 2.15 X10(3) UL (ref 1–4)
LYMPHOCYTES NFR BLD AUTO: 29.6 %
MCH RBC QN AUTO: 27.4 PG (ref 26–34)
MCHC RBC AUTO-ENTMCNC: 31.8 G/DL (ref 31–37)
MCV RBC AUTO: 86.2 FL
MONOCYTES # BLD AUTO: 0.43 X10(3) UL (ref 0.1–1)
MONOCYTES NFR BLD AUTO: 5.9 %
NEUTROPHILS # BLD AUTO: 4.52 X10 (3) UL (ref 1.5–7.7)
NEUTROPHILS # BLD AUTO: 4.52 X10(3) UL (ref 1.5–7.7)
NEUTROPHILS NFR BLD AUTO: 62.2 %
OSMOLALITY SERPL CALC.SUM OF ELEC: 288 MOSM/KG (ref 275–295)
PLATELET # BLD AUTO: 152 10(3)UL (ref 150–450)
POTASSIUM SERPL-SCNC: 3.8 MMOL/L (ref 3.5–5.1)
PROT SERPL-MCNC: 7.7 G/DL (ref 6.4–8.2)
RBC # BLD AUTO: 4.64 X10(6)UL
SODIUM SERPL-SCNC: 139 MMOL/L (ref 136–145)
WBC # BLD AUTO: 7.3 X10(3) UL (ref 4–11)

## 2022-06-20 PROCEDURE — 99214 OFFICE O/P EST MOD 30 MIN: CPT | Performed by: SPECIALIST

## 2022-06-20 NOTE — PROGRESS NOTES
Patient is here today for 3 month follow up with [facility] for Malignant neoplasm left breast. Patient denies pain. Medication, medical history and toxicities were reviewed and updated. Education Record    Learner:  Patient     Disease / Diagnosis: Malignant Neoplasm Left Breast    Barriers / Limitations:  None   Comments:    Method:  Brief focused, Discussion, Printed material and Reinforcement   Comments:    General Topics:  Medication, Pain, Procedure and Plan of care reviewed   Comments:    Outcome:  Shows understanding   Comments:    AVS provided and follow up reviewed. Patient instructed to call as needed.

## 2022-07-13 RX ORDER — TAMOXIFEN CITRATE 20 MG/1
TABLET ORAL
Qty: 90 TABLET | Refills: 0 | Status: SHIPPED | OUTPATIENT
Start: 2022-07-13

## 2022-09-19 ENCOUNTER — OFFICE VISIT (OUTPATIENT)
Dept: HEMATOLOGY/ONCOLOGY | Facility: HOSPITAL | Age: 52
End: 2022-09-19
Attending: SPECIALIST
Payer: COMMERCIAL

## 2022-09-19 VITALS
RESPIRATION RATE: 18 BRPM | SYSTOLIC BLOOD PRESSURE: 113 MMHG | WEIGHT: 167 LBS | DIASTOLIC BLOOD PRESSURE: 73 MMHG | TEMPERATURE: 98 F | HEIGHT: 65.98 IN | BODY MASS INDEX: 26.84 KG/M2 | OXYGEN SATURATION: 98 % | HEART RATE: 69 BPM

## 2022-09-19 DIAGNOSIS — C50.412 MALIGNANT NEOPLASM OF UPPER-OUTER QUADRANT OF LEFT BREAST IN FEMALE, ESTROGEN RECEPTOR POSITIVE (HCC): Primary | ICD-10-CM

## 2022-09-19 DIAGNOSIS — Z17.0 MALIGNANT NEOPLASM OF UPPER-OUTER QUADRANT OF LEFT BREAST IN FEMALE, ESTROGEN RECEPTOR POSITIVE (HCC): Primary | ICD-10-CM

## 2022-09-19 DIAGNOSIS — Z79.810 LONG-TERM CURRENT USE OF TAMOXIFEN: ICD-10-CM

## 2022-09-19 PROCEDURE — 99214 OFFICE O/P EST MOD 30 MIN: CPT | Performed by: SPECIALIST

## 2022-09-26 RX ORDER — TAMOXIFEN CITRATE 20 MG/1
TABLET ORAL
Qty: 90 TABLET | Refills: 0 | Status: SHIPPED | OUTPATIENT
Start: 2022-09-26

## 2022-09-29 ENCOUNTER — TELEPHONE (OUTPATIENT)
Dept: HEMATOLOGY/ONCOLOGY | Facility: HOSPITAL | Age: 52
End: 2022-09-29

## 2022-09-29 DIAGNOSIS — Z90.13 HISTORY OF BILATERAL MASTECTOMY: ICD-10-CM

## 2022-09-29 DIAGNOSIS — C50.412 MALIGNANT NEOPLASM OF UPPER-OUTER QUADRANT OF LEFT BREAST IN FEMALE, ESTROGEN RECEPTOR POSITIVE (HCC): Primary | ICD-10-CM

## 2022-09-29 DIAGNOSIS — Z17.0 MALIGNANT NEOPLASM OF UPPER-OUTER QUADRANT OF LEFT BREAST IN FEMALE, ESTROGEN RECEPTOR POSITIVE (HCC): Primary | ICD-10-CM

## 2022-09-29 NOTE — TELEPHONE ENCOUNTER
Patient calling to request a prescription for prosthetics and Bra-  Post mastectomy.     Please call patient

## 2022-09-29 NOTE — TELEPHONE ENCOUNTER
Patient requesting prescription to be faxed to [place] [phone] for Mastectomy prosthetics and mastectomy bra's. Fax [phone]    Phone [phone] as requested.

## 2022-10-07 ENCOUNTER — TELEPHONE (OUTPATIENT)
Dept: HEMATOLOGY/ONCOLOGY | Facility: HOSPITAL | Age: 52
End: 2022-10-07

## 2022-10-07 NOTE — TELEPHONE ENCOUNTER
Varsha Pearce is calling from [employer] and she needs the NPI number for Dr. Urvashi Hilliard on both scrips for the Breast Bras and the Bilateral Breast Forms, shes asking if it can added under the comments because it needs to be visiable and they are not letting Corinna put it herself. Fax number is [phone], Phone number is  [phone].

## 2022-10-28 ENCOUNTER — OFFICE VISIT (OUTPATIENT)
Facility: LOCATION | Age: 52
End: 2022-10-28
Payer: COMMERCIAL

## 2022-10-28 VITALS — HEART RATE: 68 BPM | TEMPERATURE: 98 F

## 2022-10-28 DIAGNOSIS — Z17.0 MALIGNANT NEOPLASM OF UPPER-OUTER QUADRANT OF LEFT BREAST IN FEMALE, ESTROGEN RECEPTOR POSITIVE (HCC): Primary | ICD-10-CM

## 2022-10-28 DIAGNOSIS — C50.412 MALIGNANT NEOPLASM OF UPPER-OUTER QUADRANT OF LEFT BREAST IN FEMALE, ESTROGEN RECEPTOR POSITIVE (HCC): Primary | ICD-10-CM

## 2022-10-28 PROCEDURE — 99213 OFFICE O/P EST LOW 20 MIN: CPT | Performed by: SURGERY

## 2022-12-14 NOTE — PROGRESS NOTES
[facility] Progress Note      Patient Name: Jo Ann Ledbetter   YOB: 1970  Medical Record Number: [id number]  Attending Physician: Willie Trotter. Nabor Pierce M.D. Date of Visit: 12/19/2022      Chief Complaint  Invasive ductal carcinoma, left breast, upper outer quadrant - follow up. Oncologic History  Julianna Madera is a 47year old female who on 09/09/2019 underwent screening mammography which showed an area of distortion in the upper outer quadrant of the left breast. Ultrasound of the left breast on the same day showed a suspicious 1.6 x 1.6 x 1.6 cm mass at the 1 o'clock position. At the 3 o'clock, ultrasound showed a second 0.7 x 0.3 x 0.7 cm circumscribed, hypoechoic mass. Visualized lymph nodes appeared unremarkable. Ultrasound guided biopsy of the 1 o' clock mass showed grade 2 invasive ductal carcinoma and associated ductal carcinoma-in-situ. The tumor was estrogen receptor 85% positive, progesterone receptor 75% positive, and Her2/samina 3+ (90%) positive by IHC and had a Ki67 of 20%. Breast MRI on 09/27/2019 showed a 2.5 x 2.0 x 2.0 cm irregular mass at the 1 o'clock position of the left breast; a subcentimeter oval mass at he 3 o'clock position of the left breast without suspicious characteristics; and a 1.6 cm mass vs non mass enhancement at the 12 o'clock position of the right breast; and no axillary adenopathy. On 10/04/2019 patient underwent MRI guided biopsy of two sites in the right breast which were negative for malignancy. Patient was premenopausal at diagnosis. On 10/14/2019 patient began neoadjuvant chemotherapy with docetaxel, carboplatin, trastuzumab, and pertuzumab (TCHP). Cycle 1 was complicated by diarrhea and transient GERD. Cycle 2 was started on 20/95/3332 and was complicated by diarrhea. Cycle 3 was started on 86/11/6514 and was complicated by less diarrhea as patient started using Imodium regularly.  Cycle 4 was started on 12/16/2019 and was complicated by diarrhea and superficial fungal infection. Cycle 5 was started on  and was complicated by diarrhea and superficial fungal infection. Cycle 6 was started on  and was complicated by diarrhea. On 2020 patient received only pertuzumab and trastuzumab. On 2020 patient underwent right mastectomy with left mastectomy with sentinel lymph node biopsy. She declined reconstruction. Pathology showed no malignancy in the right breast. Pathology from the left side showed residual grade 2 invasive ductal carcinoma measuring 0.9 cm and associated grade 2 ductal carcinoma in situ; all surgical margins were negative; one sentinel lymph node was negative for metastasis (0/1). On 2020 patient began adjuvant therapy with Kadcyla. In mid 2020 she began adjuvant tamoxifen. History of Present Illness  Patient presents for scheduled follow up and treatment. No respiratory complaints. She reports stable hot flashes. She denies any depression. Last menstrual period was in 2021; though she had a small amount of vaginal bleeding in 2022. Performance Status   Karnofsky 100% - Normal, no complaints. Past Medical History (historical data, reviewed by physician)  Breast cancer, left sided (as above); hypothyroidism; depression. Past Surgical History (historical data, reviewed by physician)  Right mastectomy and left mastectomy with sentinel lymph node biopsy (as above); hemorrhoidectomy;  x 2; LEEP after colposcopy (HPV+). Family History (historical data, reviewed by physician)  Maternal grandfather with colorectal cancer age 54 years; mother with melanoma age 45s; no known family history of breast or ovarian cancer. Social History (historical data, reviewed by physician)  Denies tobacco use; social alcohol use.        Current Medications   TAMOXIFEN 20 MG Oral Tab, TAKE 1 TABLET DAILY, Disp: 90 tablet, Rfl: 0  LEVOTHYROXINE 112 MCG Oral Tab, TAKE 2 TABLETS BY MOUTH EVERY DAY *ONLY 60 LEFT ON PRESCRIPTION*, Disp: 180 tablet, Rfl: 3  sertraline 100 MG Oral Tab, Take 1 tablet (100 mg total) by mouth daily. , Disp: 90 tablet, Rfl: 3  Cholecalciferol (VITAMIN D) 50 MCG (2000 UT) Oral Cap, Take 4,000 Units by mouth daily. , Disp: , Rfl:     Allergies   Ms. Linda Tinoco is allergic to codeine [opioid analgesics]. Vital Signs   There were no vitals taken for this visit. Physical Examination   Constitutional Well developed and well nourished; in no apparent distress; appears close to chronological age. Head  Normocephalic and atraumatic. Eyes  Conjunctiva clear; sclera anicteric. ENMT  External nose normal; external ears normal.   Neck  Supple; no masses. Hematologic No cervical, supraclavicular, axillary adenopathy. Chest  Bilateral mastectomy without reconstruction. No chest wall masses. Respiratory Normal effort; no respiratory distress; clear to auscultation bilaterally. Abdomen Soft; not tender; no masses; no hepatosplenomegaly. Extremities No lower extremity edema. Neurologic Motor and sensory grossly intact. Psychiatric Mood and affect appropriate. Laboratory  Recent Results (from the past 24 hour(s))   COMP METABOLIC PANEL (14)    Collection Time: 12/19/22 11:48 AM   Result Value Ref Range    Glucose 111 (H) 70 - 99 mg/dL    Sodium 140 136 - 145 mmol/L    Potassium 3.6 3.5 - 5.1 mmol/L    Chloride 107 98 - 112 mmol/L    CO2 28.0 21.0 - 32.0 mmol/L    Anion Gap 5 0 - 18 mmol/L    BUN 13 7 - 18 mg/dL    Creatinine 0.99 0.55 - 1.02 mg/dL    Calcium, Total 8.8 8.5 - 10.1 mg/dL    Calculated Osmolality 291 275 - 295 mOsm/kg    eGFR-Cr 69 >=60 mL/min/1.73m2    AST 22 15 - 37 U/L    ALT 24 13 - 56 U/L    Alkaline Phosphatase 44 41 - 108 U/L    Bilirubin, Total 0.4 0.1 - 2.0 mg/dL    Total Protein 7.7 6.4 - 8.2 g/dL    Albumin 4.1 3.4 - 5.0 g/dL    Globulin  3.6 2.8 - 4.4 g/dL    A/G Ratio 1.1 1.0 - 2.0    Patient Fasting for CMP?  No    CBC W/ DIFFERENTIAL    Collection Time: 12/19/22 11:48 AM   Result Value Ref Range    WBC 5.3 4.0 - 11.0 x10(3) uL    RBC 4.90 3.80 - 5.30 x10(6)uL    HGB 13.4 12.0 - 16.0 g/dL    HCT 41.3 35.0 - 48.0 %    .0 (L) 150.0 - 450.0 10(3)uL    MCV 84.3 80.0 - 100.0 fL    MCH 27.3 26.0 - 34.0 pg    MCHC 32.4 31.0 - 37.0 g/dL    RDW 13.4 %    Neutrophil Absolute Prelim 3.07 1.50 - 7.70 x10 (3) uL    Neutrophil Absolute 3.07 1.50 - 7.70 x10(3) uL    Lymphocyte Absolute 1.90 1.00 - 4.00 x10(3) uL    Monocyte Absolute 0.27 0.10 - 1.00 x10(3) uL    Eosinophil Absolute 0.05 0.00 - 0.70 x10(3) uL    Basophil Absolute 0.02 0.00 - 0.20 x10(3) uL    Immature Granulocyte Absolute 0.02 0.00 - 1.00 x10(3) uL    Neutrophil % 57.6 %    Lymphocyte % 35.6 %    Monocyte % 5.1 %    Eosinophil % 0.9 %    Basophil % 0.4 %    Immature Granulocyte % 0.4 %     Impression and Plan   1. Invasive ductal carcinoma, left breast: [id number]. Tumor was estrogen and progesterone receptor positive and Her2/samina positive. Based upon results of the [facility] Hands study, patient started adjuvant Kadcyla on 03/30/2020. In mid 04/2020 she began adjuvant therapy with tamoxifen. Continue tamoxifen without modification. Patient wishes to continue every 3 month follow up. Planned Follow Up   Patient will return for follow up in 3 months. Electronically signed by:    Benoit Ball M.D.   Associate Medical Director of Oncology [place], [place]

## 2022-12-19 ENCOUNTER — OFFICE VISIT (OUTPATIENT)
Dept: HEMATOLOGY/ONCOLOGY | Facility: HOSPITAL | Age: 52
End: 2022-12-19
Attending: SPECIALIST
Payer: COMMERCIAL

## 2022-12-19 VITALS
WEIGHT: 167.5 LBS | RESPIRATION RATE: 18 BRPM | OXYGEN SATURATION: 97 % | DIASTOLIC BLOOD PRESSURE: 73 MMHG | HEIGHT: 65.98 IN | TEMPERATURE: 98 F | BODY MASS INDEX: 26.92 KG/M2 | SYSTOLIC BLOOD PRESSURE: 116 MMHG | HEART RATE: 71 BPM

## 2022-12-19 DIAGNOSIS — C50.412 MALIGNANT NEOPLASM OF UPPER-OUTER QUADRANT OF LEFT BREAST IN FEMALE, ESTROGEN RECEPTOR POSITIVE (HCC): Primary | ICD-10-CM

## 2022-12-19 DIAGNOSIS — Z79.810 LONG-TERM CURRENT USE OF TAMOXIFEN: ICD-10-CM

## 2022-12-19 DIAGNOSIS — Z17.0 MALIGNANT NEOPLASM OF UPPER-OUTER QUADRANT OF LEFT BREAST IN FEMALE, ESTROGEN RECEPTOR POSITIVE (HCC): Primary | ICD-10-CM

## 2022-12-19 LAB
ALBUMIN SERPL-MCNC: 4.1 G/DL (ref 3.4–5)
ALBUMIN/GLOB SERPL: 1.1 {RATIO} (ref 1–2)
ALP LIVER SERPL-CCNC: 44 U/L
ALT SERPL-CCNC: 24 U/L
ANION GAP SERPL CALC-SCNC: 5 MMOL/L (ref 0–18)
AST SERPL-CCNC: 22 U/L (ref 15–37)
BASOPHILS # BLD AUTO: 0.02 X10(3) UL (ref 0–0.2)
BASOPHILS NFR BLD AUTO: 0.4 %
BILIRUB SERPL-MCNC: 0.4 MG/DL (ref 0.1–2)
BUN BLD-MCNC: 13 MG/DL (ref 7–18)
CALCIUM BLD-MCNC: 8.8 MG/DL (ref 8.5–10.1)
CHLORIDE SERPL-SCNC: 107 MMOL/L (ref 98–112)
CO2 SERPL-SCNC: 28 MMOL/L (ref 21–32)
CREAT BLD-MCNC: 0.99 MG/DL
EOSINOPHIL # BLD AUTO: 0.05 X10(3) UL (ref 0–0.7)
EOSINOPHIL NFR BLD AUTO: 0.9 %
ERYTHROCYTE [DISTWIDTH] IN BLOOD BY AUTOMATED COUNT: 13.4 %
FASTING STATUS PATIENT QL REPORTED: NO
GFR SERPLBLD BASED ON 1.73 SQ M-ARVRAT: 69 ML/MIN/1.73M2 (ref 60–?)
GLOBULIN PLAS-MCNC: 3.6 G/DL (ref 2.8–4.4)
GLUCOSE BLD-MCNC: 111 MG/DL (ref 70–99)
HCT VFR BLD AUTO: 41.3 %
HGB BLD-MCNC: 13.4 G/DL
IMM GRANULOCYTES # BLD AUTO: 0.02 X10(3) UL (ref 0–1)
IMM GRANULOCYTES NFR BLD: 0.4 %
LYMPHOCYTES # BLD AUTO: 1.9 X10(3) UL (ref 1–4)
LYMPHOCYTES NFR BLD AUTO: 35.6 %
MCH RBC QN AUTO: 27.3 PG (ref 26–34)
MCHC RBC AUTO-ENTMCNC: 32.4 G/DL (ref 31–37)
MCV RBC AUTO: 84.3 FL
MONOCYTES # BLD AUTO: 0.27 X10(3) UL (ref 0.1–1)
MONOCYTES NFR BLD AUTO: 5.1 %
NEUTROPHILS # BLD AUTO: 3.07 X10 (3) UL (ref 1.5–7.7)
NEUTROPHILS # BLD AUTO: 3.07 X10(3) UL (ref 1.5–7.7)
NEUTROPHILS NFR BLD AUTO: 57.6 %
OSMOLALITY SERPL CALC.SUM OF ELEC: 291 MOSM/KG (ref 275–295)
PLATELET # BLD AUTO: 149 10(3)UL (ref 150–450)
POTASSIUM SERPL-SCNC: 3.6 MMOL/L (ref 3.5–5.1)
PROT SERPL-MCNC: 7.7 G/DL (ref 6.4–8.2)
RBC # BLD AUTO: 4.9 X10(6)UL
SODIUM SERPL-SCNC: 140 MMOL/L (ref 136–145)
WBC # BLD AUTO: 5.3 X10(3) UL (ref 4–11)

## 2022-12-19 PROCEDURE — 99214 OFFICE O/P EST MOD 30 MIN: CPT | Performed by: SPECIALIST

## 2022-12-19 RX ORDER — TAMOXIFEN CITRATE 20 MG/1
TABLET ORAL
Qty: 90 TABLET | Refills: 0 | Status: SHIPPED | OUTPATIENT
Start: 2022-12-19

## 2022-12-19 NOTE — PROGRESS NOTES
Patient is here today for month follow up with Carlee Bowman for Malignant neoplasm of the left breast. Currently on Tamoxifen therapy. Patient denies pain. Occasional hot flashes. Stated is feeling good. Medication list,medical history and toxicities were reviewed and updated. Education Record    Learner:  Patient      Disease / Diagnosis: Malignant neoplasm of the left breast.     Barriers / Limitations:  None   Comments:    Method:  Brief focused, Discussion, Printed material and Reinforcement   Comments:    General Topics:  Medication, Pain, Procedure and Plan of care reviewed   Comments:    Outcome:  Shows understanding   Comments:    AVS provided and follow up reviewed. Patient instructed to call as needed.

## 2023-03-20 ENCOUNTER — OFFICE VISIT (OUTPATIENT)
Dept: HEMATOLOGY/ONCOLOGY | Facility: HOSPITAL | Age: 53
End: 2023-03-20
Attending: SPECIALIST
Payer: COMMERCIAL

## 2023-03-20 VITALS
BODY MASS INDEX: 26.92 KG/M2 | TEMPERATURE: 98 F | WEIGHT: 167.5 LBS | DIASTOLIC BLOOD PRESSURE: 72 MMHG | HEIGHT: 65.98 IN | SYSTOLIC BLOOD PRESSURE: 108 MMHG | OXYGEN SATURATION: 97 % | HEART RATE: 70 BPM | RESPIRATION RATE: 18 BRPM

## 2023-03-20 DIAGNOSIS — C50.412 MALIGNANT NEOPLASM OF UPPER-OUTER QUADRANT OF LEFT BREAST IN FEMALE, ESTROGEN RECEPTOR POSITIVE (HCC): ICD-10-CM

## 2023-03-20 DIAGNOSIS — Z17.0 MALIGNANT NEOPLASM OF UPPER-OUTER QUADRANT OF LEFT BREAST IN FEMALE, ESTROGEN RECEPTOR POSITIVE (HCC): ICD-10-CM

## 2023-03-20 DIAGNOSIS — N91.2 AMENORRHEA: Primary | ICD-10-CM

## 2023-03-20 LAB
ESTRADIOL SERPL-MCNC: 13.4 PG/ML
FSH SERPL-ACNC: 18.7 MIU/ML
LH SERPL-ACNC: 10.1 MIU/ML

## 2023-03-20 PROCEDURE — 99214 OFFICE O/P EST MOD 30 MIN: CPT | Performed by: SPECIALIST

## 2023-03-20 NOTE — PROGRESS NOTES
Patient is here today for 3 month follow up with Klarissa Portillo for Malignant neoplasm of the left breast. Currently on Tamoxifen therapy. Stated mild hot flashes. Patient denies pain. Medication list,medical history and toxicities were reviewed and updated. Education Record    Learner:  Patient      Disease / Diagnosis - Malignant neoplasm of the left breast    Barriers / Limitations:  None   Comments:    Method:  Brief focused, Discussion, Printed material and Reinforcement   Comments:    General Topics:  Medication, Pain, Procedure and Plan of care reviewed   Comments:    Outcome:  Shows understanding   Comments:      AVS provided and follow up reviewed. Patient instructed to call as needed.

## 2023-03-21 RX ORDER — ANASTROZOLE 1 MG/1
1 TABLET ORAL DAILY
Qty: 30 TABLET | Refills: 0 | Status: SHIPPED | OUTPATIENT
Start: 2023-03-21

## 2023-04-20 ENCOUNTER — OFFICE VISIT (OUTPATIENT)
Dept: HEMATOLOGY/ONCOLOGY | Facility: HOSPITAL | Age: 53
End: 2023-04-20
Attending: SPECIALIST
Payer: COMMERCIAL

## 2023-04-20 VITALS
OXYGEN SATURATION: 99 % | RESPIRATION RATE: 18 BRPM | HEART RATE: 73 BPM | SYSTOLIC BLOOD PRESSURE: 109 MMHG | TEMPERATURE: 98 F | WEIGHT: 167.5 LBS | DIASTOLIC BLOOD PRESSURE: 72 MMHG | BODY MASS INDEX: 27 KG/M2

## 2023-04-20 DIAGNOSIS — Z17.0 MALIGNANT NEOPLASM OF UPPER-OUTER QUADRANT OF LEFT BREAST IN FEMALE, ESTROGEN RECEPTOR POSITIVE (HCC): ICD-10-CM

## 2023-04-20 DIAGNOSIS — F32.89 OTHER DEPRESSION: ICD-10-CM

## 2023-04-20 DIAGNOSIS — Z79.810 LONG-TERM CURRENT USE OF TAMOXIFEN: ICD-10-CM

## 2023-04-20 DIAGNOSIS — C50.412 MALIGNANT NEOPLASM OF UPPER-OUTER QUADRANT OF LEFT BREAST IN FEMALE, ESTROGEN RECEPTOR POSITIVE (HCC): ICD-10-CM

## 2023-04-20 DIAGNOSIS — N91.2 AMENORRHEA: Primary | ICD-10-CM

## 2023-04-20 LAB
ESTRADIOL SERPL-MCNC: 92.7 PG/ML
FSH SERPL-ACNC: 7.9 MIU/ML
LH SERPL-ACNC: 6.3 MIU/ML

## 2023-04-20 PROCEDURE — 99214 OFFICE O/P EST MOD 30 MIN: CPT | Performed by: SPECIALIST

## 2023-04-20 RX ORDER — ANASTROZOLE 1 MG/1
1 TABLET ORAL DAILY
Qty: 30 TABLET | Refills: 1 | Status: SHIPPED | OUTPATIENT
Start: 2023-04-20 | End: 2023-04-20

## 2023-04-20 NOTE — PROGRESS NOTES
Patient is here today for follow up with Maude Smith for Malignant neoplasm of the left breast. Patient has been on Anastrozole for approximately one month. Feels her depression is worse than when she was on Tamoxifen. Patient denies pain. Medication list,medical history and toxicities were reviewed and updated. Education Record    Learner:  Patient      Disease / Diagnosis: Malignant neoplasm of the left breast.    Barriers / Limitations:  None   Comments:    Method:  Brief focused, Discussion, Printed material and Reinforcement   Comments:    General Topics:  Medication, Pain, Procedure and Plan of care reviewed   Comments:    Outcome:  Shows understanding   Comments:      AVS provided and follow up reviewed. Patient instructed to call as needed.

## 2023-04-22 RX ORDER — TAMOXIFEN CITRATE 20 MG/1
20 TABLET ORAL DAILY
Qty: 90 TABLET | Refills: 1 | Status: SHIPPED | OUTPATIENT
Start: 2023-04-22

## 2023-05-18 ENCOUNTER — APPOINTMENT (OUTPATIENT)
Dept: HEMATOLOGY/ONCOLOGY | Facility: HOSPITAL | Age: 53
End: 2023-05-18
Attending: SPECIALIST
Payer: COMMERCIAL

## 2023-06-12 ENCOUNTER — OFFICE VISIT (OUTPATIENT)
Facility: LOCATION | Age: 53
End: 2023-06-12
Payer: COMMERCIAL

## 2023-06-12 VITALS — HEART RATE: 73 BPM | TEMPERATURE: 98 F

## 2023-06-12 DIAGNOSIS — Z17.0 MALIGNANT NEOPLASM OF UPPER-OUTER QUADRANT OF LEFT BREAST IN FEMALE, ESTROGEN RECEPTOR POSITIVE (HCC): Primary | ICD-10-CM

## 2023-06-12 DIAGNOSIS — C50.412 MALIGNANT NEOPLASM OF UPPER-OUTER QUADRANT OF LEFT BREAST IN FEMALE, ESTROGEN RECEPTOR POSITIVE (HCC): Primary | ICD-10-CM

## 2023-06-12 PROCEDURE — 99213 OFFICE O/P EST LOW 20 MIN: CPT | Performed by: SURGERY

## 2023-06-22 ENCOUNTER — APPOINTMENT (OUTPATIENT)
Dept: HEMATOLOGY/ONCOLOGY | Facility: HOSPITAL | Age: 53
End: 2023-06-22
Attending: SPECIALIST
Payer: COMMERCIAL

## 2023-08-29 DIAGNOSIS — F32.89 OTHER DEPRESSION: ICD-10-CM

## 2023-08-29 RX ORDER — SERTRALINE HYDROCHLORIDE 100 MG/1
150 TABLET, FILM COATED ORAL DAILY
Qty: 135 TABLET | Refills: 1 | Status: SHIPPED | OUTPATIENT
Start: 2023-08-29

## 2023-08-31 ENCOUNTER — OFFICE VISIT (OUTPATIENT)
Dept: HEMATOLOGY/ONCOLOGY | Facility: HOSPITAL | Age: 53
End: 2023-08-31
Attending: SPECIALIST
Payer: COMMERCIAL

## 2023-08-31 VITALS
TEMPERATURE: 98 F | WEIGHT: 170.19 LBS | HEIGHT: 65.98 IN | BODY MASS INDEX: 27.35 KG/M2 | HEART RATE: 76 BPM | SYSTOLIC BLOOD PRESSURE: 117 MMHG | OXYGEN SATURATION: 97 % | DIASTOLIC BLOOD PRESSURE: 75 MMHG

## 2023-08-31 DIAGNOSIS — F32.89 OTHER DEPRESSION: ICD-10-CM

## 2023-08-31 DIAGNOSIS — Z79.810 LONG-TERM CURRENT USE OF TAMOXIFEN: ICD-10-CM

## 2023-08-31 DIAGNOSIS — Z17.0 MALIGNANT NEOPLASM OF UPPER-OUTER QUADRANT OF LEFT BREAST IN FEMALE, ESTROGEN RECEPTOR POSITIVE: Primary | ICD-10-CM

## 2023-08-31 DIAGNOSIS — C50.412 MALIGNANT NEOPLASM OF UPPER-OUTER QUADRANT OF LEFT BREAST IN FEMALE, ESTROGEN RECEPTOR POSITIVE: Primary | ICD-10-CM

## 2023-08-31 LAB
ALBUMIN SERPL-MCNC: 4 G/DL (ref 3.4–5)
ALBUMIN/GLOB SERPL: 1.2 {RATIO} (ref 1–2)
ALP LIVER SERPL-CCNC: 49 U/L
ALT SERPL-CCNC: 25 U/L
ANION GAP SERPL CALC-SCNC: 10 MMOL/L (ref 0–18)
AST SERPL-CCNC: 17 U/L (ref 15–37)
BASOPHILS # BLD AUTO: 0.04 X10(3) UL (ref 0–0.2)
BASOPHILS NFR BLD AUTO: 0.8 %
BILIRUB SERPL-MCNC: 0.4 MG/DL (ref 0.1–2)
BUN BLD-MCNC: 15 MG/DL (ref 7–18)
CALCIUM BLD-MCNC: 9.1 MG/DL (ref 8.5–10.1)
CHLORIDE SERPL-SCNC: 106 MMOL/L (ref 98–112)
CO2 SERPL-SCNC: 23 MMOL/L (ref 21–32)
CREAT BLD-MCNC: 0.9 MG/DL
EGFRCR SERPLBLD CKD-EPI 2021: 77 ML/MIN/1.73M2 (ref 60–?)
EOSINOPHIL # BLD AUTO: 0.08 X10(3) UL (ref 0–0.7)
EOSINOPHIL NFR BLD AUTO: 1.6 %
ERYTHROCYTE [DISTWIDTH] IN BLOOD BY AUTOMATED COUNT: 13.2 %
FASTING STATUS PATIENT QL REPORTED: NO
GLOBULIN PLAS-MCNC: 3.3 G/DL (ref 2.8–4.4)
GLUCOSE BLD-MCNC: 133 MG/DL (ref 70–99)
HCT VFR BLD AUTO: 39.8 %
HGB BLD-MCNC: 12.9 G/DL
IMM GRANULOCYTES # BLD AUTO: 0.02 X10(3) UL (ref 0–1)
IMM GRANULOCYTES NFR BLD: 0.4 %
LYMPHOCYTES # BLD AUTO: 1.87 X10(3) UL (ref 1–4)
LYMPHOCYTES NFR BLD AUTO: 37 %
MCH RBC QN AUTO: 26.5 PG (ref 26–34)
MCHC RBC AUTO-ENTMCNC: 32.4 G/DL (ref 31–37)
MCV RBC AUTO: 81.9 FL
MONOCYTES # BLD AUTO: 0.29 X10(3) UL (ref 0.1–1)
MONOCYTES NFR BLD AUTO: 5.7 %
NEUTROPHILS # BLD AUTO: 2.75 X10 (3) UL (ref 1.5–7.7)
NEUTROPHILS # BLD AUTO: 2.75 X10(3) UL (ref 1.5–7.7)
NEUTROPHILS NFR BLD AUTO: 54.5 %
OSMOLALITY SERPL CALC.SUM OF ELEC: 291 MOSM/KG (ref 275–295)
PLATELET # BLD AUTO: 152 10(3)UL (ref 150–450)
POTASSIUM SERPL-SCNC: 3.9 MMOL/L (ref 3.5–5.1)
PROT SERPL-MCNC: 7.3 G/DL (ref 6.4–8.2)
RBC # BLD AUTO: 4.86 X10(6)UL
SODIUM SERPL-SCNC: 139 MMOL/L (ref 136–145)
WBC # BLD AUTO: 5.1 X10(3) UL (ref 4–11)

## 2023-08-31 PROCEDURE — 99214 OFFICE O/P EST MOD 30 MIN: CPT | Performed by: SPECIALIST

## 2023-08-31 RX ORDER — TRETINOIN 1 MG/G
CREAM TOPICAL NIGHTLY
COMMUNITY
Start: 2023-08-28

## 2023-10-06 RX ORDER — TAMOXIFEN CITRATE 20 MG/1
20 TABLET ORAL DAILY
Qty: 90 TABLET | Refills: 0 | Status: SHIPPED | OUTPATIENT
Start: 2023-10-06

## 2023-11-27 ENCOUNTER — OFFICE VISIT (OUTPATIENT)
Dept: HEMATOLOGY/ONCOLOGY | Facility: HOSPITAL | Age: 53
End: 2023-11-27
Attending: SPECIALIST
Payer: COMMERCIAL

## 2023-11-27 VITALS
DIASTOLIC BLOOD PRESSURE: 84 MMHG | OXYGEN SATURATION: 98 % | BODY MASS INDEX: 27 KG/M2 | WEIGHT: 168 LBS | SYSTOLIC BLOOD PRESSURE: 127 MMHG | TEMPERATURE: 98 F | RESPIRATION RATE: 18 BRPM | HEART RATE: 69 BPM | HEIGHT: 65.98 IN

## 2023-11-27 DIAGNOSIS — Z17.0 MALIGNANT NEOPLASM OF UPPER-OUTER QUADRANT OF LEFT BREAST IN FEMALE, ESTROGEN RECEPTOR POSITIVE: Primary | ICD-10-CM

## 2023-11-27 DIAGNOSIS — Z79.810 LONG-TERM CURRENT USE OF TAMOXIFEN: ICD-10-CM

## 2023-11-27 DIAGNOSIS — C50.412 MALIGNANT NEOPLASM OF UPPER-OUTER QUADRANT OF LEFT BREAST IN FEMALE, ESTROGEN RECEPTOR POSITIVE: Primary | ICD-10-CM

## 2023-11-27 DIAGNOSIS — F32.89 OTHER DEPRESSION: ICD-10-CM

## 2023-11-27 PROCEDURE — 99214 OFFICE O/P EST MOD 30 MIN: CPT | Performed by: SPECIALIST

## 2023-11-27 NOTE — PROGRESS NOTES
Patient is here today for 3 month follow up with Dr. Kory Solo for Malignant Neoplasm of the left Breast. Patient denies pain. Patient is on Tamoxifen therapy. Medication list,medical history and toxicities were reviewed and updated. Education Record    Learner:  Patient      Disease / Diagnosis: Malignant neoplasm of the left breast.     Barriers / Limitations:  None   Comments:    Method:  Brief focused, Discussion, Printed material and Reinforcement   Comments:    General Topics:  Medication, Pain, Procedure and Plan of care reviewed   Comments:    Outcome:  Shows understanding   Comments:      AVS provided and follow up reviewed. Patient instructed to call as needed.

## 2023-12-12 ENCOUNTER — OFFICE VISIT (OUTPATIENT)
Facility: LOCATION | Age: 53
End: 2023-12-12
Payer: COMMERCIAL

## 2023-12-12 ENCOUNTER — OFFICE VISIT (OUTPATIENT)
Dept: HEMATOLOGY/ONCOLOGY | Facility: HOSPITAL | Age: 53
End: 2023-12-12
Attending: SPECIALIST
Payer: COMMERCIAL

## 2023-12-12 VITALS
OXYGEN SATURATION: 97 % | HEIGHT: 65.98 IN | TEMPERATURE: 97 F | WEIGHT: 169 LBS | SYSTOLIC BLOOD PRESSURE: 106 MMHG | DIASTOLIC BLOOD PRESSURE: 70 MMHG | BODY MASS INDEX: 27.16 KG/M2 | HEART RATE: 75 BPM | RESPIRATION RATE: 16 BRPM

## 2023-12-12 DIAGNOSIS — C50.412 MALIGNANT NEOPLASM OF UPPER-OUTER QUADRANT OF LEFT BREAST IN FEMALE, ESTROGEN RECEPTOR POSITIVE  (HCC): Primary | ICD-10-CM

## 2023-12-12 DIAGNOSIS — Z17.0 MALIGNANT NEOPLASM OF UPPER-OUTER QUADRANT OF LEFT BREAST IN FEMALE, ESTROGEN RECEPTOR POSITIVE  (HCC): Primary | ICD-10-CM

## 2023-12-12 PROCEDURE — 99213 OFFICE O/P EST LOW 20 MIN: CPT | Performed by: SURGERY

## 2023-12-12 PROCEDURE — 99211 OFF/OP EST MAY X REQ PHY/QHP: CPT

## 2023-12-15 NOTE — PROGRESS NOTES
Subjective:   Patient ID: Evgeny Morgan is a 47year old female who underwent bilateral mastectomy and left sentinel lymph node biopsy on February 20, 2020 for triple positive left breast invasive ductal carcinoma. She had received neoadjuvant chemotherapy (TCHP). She had her Port-A-Cath removed December 10, 2020. She is taking tamoxifen. She presents for continued 6-month surveillance. She is without complaints. She has noticed no new changes. HPI    History/Other:   Review of Systems  Current Outpatient Medications   Medication Sig Dispense Refill    tamoxifen 20 MG Oral Tab TAKE 1 TABLET DAILY 90 tablet 0    Tretinoin 0.1 % External Cream Apply topically nightly. sertraline 100 MG Oral Tab Take 1.5 tablets (150 mg total) by mouth daily. 135 tablet 1    LEVOTHYROXINE 112 MCG Oral Tab TAKE 2 TABLETS BY MOUTH EVERY DAY *ONLY 60 LEFT ON PRESCRIPTION* 180 tablet 3    Cholecalciferol (VITAMIN D) 50 MCG (2000 UT) Oral Cap Take 2 capsules (4,000 Units total) by mouth daily. Allergies: Allergies   Allergen Reactions    Codeine [Opioid Analgesics] NAUSEA ONLY       Objective:   Physical Exam  Vitals and nursing note reviewed. Constitutional:       General: She is not in acute distress. Appearance: She is well-developed. She is not diaphoretic. HENT:      Head: Normocephalic and atraumatic. Eyes:      General: No scleral icterus. Conjunctiva/sclera: Conjunctivae normal.      Pupils: Pupils are equal, round, and reactive to light. Neck:      Vascular: No JVD. Trachea: Trachea normal.   Chest:      Chest wall: No mass. Breasts:     Right: No mass, skin change or tenderness. Left: No mass, skin change or tenderness. Comments: Well-healed bilateral chest scars. Musculoskeletal:      Cervical back: Full passive range of motion without pain and neck supple. Lymphadenopathy:      Upper Body:      Right upper body: No axillary or pectoral adenopathy.       Left upper body: No axillary or pectoral adenopathy. Neurological:      Mental Status: She is alert and oriented to person, place, and time. Psychiatric:         Speech: Speech normal.         Behavior: Behavior normal.         Assessment & Plan:   1. Malignant neoplasm of upper-outer quadrant of left breast in female, estrogen receptor positive  ([facility] [address]        The patient has no evidence of disease. She should return for continued surveillance in 6 months.     Jb Nolasco MD

## 2023-12-18 ENCOUNTER — PATIENT OUTREACH (OUTPATIENT)
Facility: LOCATION | Age: 53
End: 2023-12-18

## 2024-01-04 RX ORDER — TAMOXIFEN CITRATE 20 MG/1
20 TABLET ORAL DAILY
Qty: 90 TABLET | Refills: 1 | Status: SHIPPED | OUTPATIENT
Start: 2024-01-04

## 2024-02-26 ENCOUNTER — OFFICE VISIT (OUTPATIENT)
Dept: HEMATOLOGY/ONCOLOGY | Facility: HOSPITAL | Age: 54
End: 2024-02-26
Attending: SPECIALIST
Payer: COMMERCIAL

## 2024-02-26 VITALS
HEART RATE: 78 BPM | HEIGHT: 65.98 IN | DIASTOLIC BLOOD PRESSURE: 76 MMHG | RESPIRATION RATE: 16 BRPM | WEIGHT: 165.19 LBS | BODY MASS INDEX: 26.55 KG/M2 | OXYGEN SATURATION: 98 % | TEMPERATURE: 97 F | SYSTOLIC BLOOD PRESSURE: 113 MMHG

## 2024-02-26 DIAGNOSIS — F32.89 OTHER DEPRESSION: ICD-10-CM

## 2024-02-26 DIAGNOSIS — Z17.0 MALIGNANT NEOPLASM OF UPPER-OUTER QUADRANT OF LEFT BREAST IN FEMALE, ESTROGEN RECEPTOR POSITIVE (HCC): Primary | ICD-10-CM

## 2024-02-26 DIAGNOSIS — C50.412 MALIGNANT NEOPLASM OF UPPER-OUTER QUADRANT OF LEFT BREAST IN FEMALE, ESTROGEN RECEPTOR POSITIVE (HCC): Primary | ICD-10-CM

## 2024-02-26 DIAGNOSIS — Z79.810 LONG-TERM CURRENT USE OF TAMOXIFEN: ICD-10-CM

## 2024-02-26 PROCEDURE — 99214 OFFICE O/P EST MOD 30 MIN: CPT | Performed by: SPECIALIST

## 2024-02-26 RX ORDER — ESCITALOPRAM OXALATE 5 MG/1
TABLET ORAL
COMMUNITY

## 2024-02-26 NOTE — PROGRESS NOTES
[facility] Progress Note      Patient Name: Mee Ruiz   YOB: 1970  Medical Record Number: [id number]  Attending Physician: Antwan Puckett M.D.     The 21st Century Cures Act makes medical notes like these available to patients in the interest of transparency. Please be advised this is a medical document. Medical documents are intended to carry relevant information, facts as evident, and the clinical opinion of the practitioner. The medical note is intended as peer to peer communication and may appear blunt or direct. It is written in medical language and may contain abbreviations or verbiage that are unfamiliar.     Date of Visit: 2/26/2024     Chief Complaint  Invasive ductal carcinoma, left breast, upper outer quadrant - follow up.    Oncologic History  Mee Ruiz is a 53 year old female who on 09/09/2019 underwent screening mammography which showed an area of distortion in the upper outer quadrant of the left breast. Ultrasound of the left breast on the same day showed a suspicious 1.6 x 1.6 x 1.6 cm mass at the 1 o'clock position. At the 3 o'clock, ultrasound showed a second 0.7 x 0.3 x 0.7 cm circumscribed, hypoechoic mass. Visualized lymph nodes appeared unremarkable.     Ultrasound guided biopsy of the 1 o' clock mass showed grade 2 invasive ductal carcinoma and associated ductal carcinoma-in-situ. The tumor was estrogen receptor 85% positive, progesterone receptor 75% positive, and Her2/samina 3+ (90%) positive by IHC and had a Ki67 of 20%. Breast MRI on 09/27/2019 showed a 2.5 x 2.0 x 2.0 cm irregular mass at the 1 o'clock position of the left breast; a subcentimeter oval mass at he 3 o'clock position of the left breast without suspicious characteristics; and a 1.6 cm mass vs non mass enhancement at the 12 o'clock position of the right breast; and no axillary adenopathy. On 10/04/2019 patient underwent MRI guided biopsy of two sites in the right breast  which were negative for malignancy.     Patient was premenopausal at diagnosis.     On 10/14/2019 patient began neoadjuvant chemotherapy with docetaxel, carboplatin, trastuzumab, and pertuzumab (TCHP). Cycle 1 was complicated by diarrhea and transient GERD. Cycle 2 was started on 11/04/2019 and was complicated by diarrhea. Cycle 3 was started on 11/25/2019 and was complicated by less diarrhea as patient started using Imodium regularly. Cycle 4 was started on 12/16/2019 and was complicated by diarrhea and superficial fungal infection. Cycle 5 was started on 01/06/2020 and was complicated by diarrhea and superficial fungal infection. Cycle 6 was started on 01/27/2020 and was complicated by diarrhea.     On 02/17/2020 patient received only pertuzumab and trastuzumab.     On 02/20/2020 patient underwent right mastectomy with left mastectomy with sentinel lymph node biopsy. She declined reconstruction. Pathology showed no malignancy in the right breast. Pathology from the left side showed residual grade 2 invasive ductal carcinoma measuring 0.9 cm and associated grade 2 ductal carcinoma in situ; all surgical margins were negative; one sentinel lymph node was negative for metastasis (0/1).     On 03/30/2020 patient began adjuvant therapy with Kadcyla.     In mid 04/2020 she began adjuvant tamoxifen. In mid 03/2023, patient was switched to anastrozole with laboratory studies suggested post-menopausal state. With therapy, patient developed increased depression resulting in an increase in her sertraline dose.     Laboratory studies on 04/20/2023 showed premenopausal state and patient was switched back to tamoxifen.    History of Present Illness  Patient presents for scheduled follow up. She denies any self palpated masses. Mood is good and stable.     Performance Status   Karnofsky 100% - Normal, no complaints.    Past Medical History (historical data, reviewed by physician)  Breast cancer, left sided (as above);  hypothyroidism; depression.     Past Surgical History (historical data, reviewed by physician)  Right mastectomy and left mastectomy with sentinel lymph node biopsy (as above); hemorrhoidectomy;  x 2; LEEP after colposcopy (HPV+).     Family History (historical data, reviewed by physician)  Maternal grandfather with colorectal cancer age 55 years; mother with melanoma age 40s; no known family history of breast or ovarian cancer.     Social History (historical data, reviewed by physician)  Denies tobacco use; social alcohol use.       Current Medications    escitalopram 5 MG Oral Tab TAKE 1 TABLET BY MOUTH WITH DINNER FOR 2 WEEKS THEN TAKE 2 TABLETS BY MOUTH WITH DINNER      Blood Pressure Monitoring (BLOOD PRESSURE DIGITAL SOLN) Does not apply Kit       tamoxifen 20 MG Oral Tab Take 1 tablet (20 mg total) by mouth daily. 90 tablet 1    Tretinoin 0.1 % External Cream Apply topically nightly.      LEVOTHYROXINE 112 MCG Oral Tab TAKE 2 TABLETS BY MOUTH EVERY DAY *ONLY 60 LEFT ON PRESCRIPTION* 180 tablet 3    Cholecalciferol (VITAMIN D) 50 MCG (2000 UT) Oral Cap Take 2 capsules (4,000 Units total) by mouth daily.       Allergies   Ms. Ruiz is allergic to codeine [opioid analgesics].    Vital Signs   /76 (BP Location: Left arm, Patient Position: Sitting, Cuff Size: adult)   Pulse 78   Temp 97.3 °F (36.3 °C) (Temporal)   Resp 16   Ht 1.676 m (5' 5.98\")   Wt 74.9 kg (165 lb 3.2 oz)   SpO2 98%   BMI 26.68 kg/m²     Physical Examination   Constitutional  Well developed and well nourished; in no apparent distress; appears close to chronological age.  Head   Normocephalic and atraumatic.  Eyes   Conjunctiva clear; sclera anicteric.  ENMT   External nose normal; external ears normal.   Neck   Supple; no masses.  Hematologic  No cervical, supraclavicular, axillary adenopathy.  Chest   Bilateral mastectomy without reconstruction. No chest wall masses.  Respiratory  Normal effort; no respiratory distress;  clear to auscultation bilaterally.  Abdomen  Soft; not tender; no masses; no hepatosplenomegaly.   Extremities  No lower extremity edema.  Neurologic  Motor and sensory grossly intact.  Psychiatric  Mood and affect appropriate.    Laboratory  No results found for this or any previous visit (from the past 168 hour(s)).    Impression and Plan   1.   Invasive ductal carcinoma, left breast: [id number]. Tumor was estrogen and progesterone receptor positive and Her2/samina positive. Based upon results of the [facility] study, patient started adjuvant Kadcyla on 03/30/2020. In mid 04/2020 she began adjuvant therapy with tamoxifen. In mid 03/2023, she was switched to anastrozole after laboratory studies suggested post-menopausal state; however, she switched back to tamoxifen on 04/20/2023 after labs showed premenopausal state.           Continue tamoxifen without modification. Given change in mood with anastrozole, I do not recommend ovarian suppression.     2.   Depression: Management as per primary care provider.     Planned Follow Up   Patient will return for follow up in 3 months.    Electronically signed by:    Antwan Puckett M.D.  System Medical Director of Oncology Services  [facility]

## 2024-02-26 NOTE — PROGRESS NOTES
Patient is here for MD follow up for Breast cancer. Patient continues on Tamoxifen daily. Tolerating well. Patient recently switched from Zoloft to Lexapro.     Education Record    Learner:  Patient    Disease / Diagnosis:  Breast cancer     Barriers / Limitations:  None   Comments:    Method:  Discussion   Comments:    General Topics:  Plan of care reviewed   Comments:    Outcome:  Shows understanding   Comments:

## 2024-06-09 NOTE — PROGRESS NOTES
[facility] Progress Note      Patient Name: Mee Ruiz   YOB: 1970  Medical Record Number: [id number]  Attending Physician: Antwan Puckett M.D.     The 21st Century Cures Act makes medical notes like these available to patients in the interest of transparency. Please be advised this is a medical document. Medical documents are intended to carry relevant information, facts as evident, and the clinical opinion of the practitioner. The medical note is intended as peer to peer communication and may appear blunt or direct. It is written in medical language and may contain abbreviations or verbiage that are unfamiliar.     Date of Visit: 6/10/2024     Chief Complaint  Invasive ductal carcinoma, left breast, upper outer quadrant - follow up.    Oncologic History  Mee Ruiz is a 53 year old female who on 09/09/2019 underwent screening mammography which showed an area of distortion in the upper outer quadrant of the left breast. Ultrasound of the left breast on the same day showed a suspicious 1.6 x 1.6 x 1.6 cm mass at the 1 o'clock position. At the 3 o'clock, ultrasound showed a second 0.7 x 0.3 x 0.7 cm circumscribed, hypoechoic mass. Visualized lymph nodes appeared unremarkable.     Ultrasound guided biopsy of the 1 o' clock mass showed grade 2 invasive ductal carcinoma and associated ductal carcinoma-in-situ. The tumor was estrogen receptor 85% positive, progesterone receptor 75% positive, and Her2/samina 3+ (90%) positive by IHC and had a Ki67 of 20%. Breast MRI on 09/27/2019 showed a 2.5 x 2.0 x 2.0 cm irregular mass at the 1 o'clock position of the left breast; a subcentimeter oval mass at he 3 o'clock position of the left breast without suspicious characteristics; and a 1.6 cm mass vs non mass enhancement at the 12 o'clock position of the right breast; and no axillary adenopathy. On 10/04/2019 patient underwent MRI guided biopsy of two sites in the right breast  which were negative for malignancy.     Patient was premenopausal at diagnosis.     On 10/14/2019 patient began neoadjuvant chemotherapy with docetaxel, carboplatin, trastuzumab, and pertuzumab (TCHP). Cycle 1 was complicated by diarrhea and transient GERD. Cycle 2 was started on 11/04/2019 and was complicated by diarrhea. Cycle 3 was started on 11/25/2019 and was complicated by less diarrhea as patient started using Imodium regularly. Cycle 4 was started on 12/16/2019 and was complicated by diarrhea and superficial fungal infection. Cycle 5 was started on 01/06/2020 and was complicated by diarrhea and superficial fungal infection. Cycle 6 was started on 01/27/2020 and was complicated by diarrhea.     On 02/17/2020 patient received only pertuzumab and trastuzumab.     On 02/20/2020 patient underwent right mastectomy with left mastectomy with sentinel lymph node biopsy. She declined reconstruction. Pathology showed no malignancy in the right breast. Pathology from the left side showed residual grade 2 invasive ductal carcinoma measuring 0.9 cm and associated grade 2 ductal carcinoma in situ; all surgical margins were negative; one sentinel lymph node was negative for metastasis (0/1).     On 03/30/2020 patient began adjuvant therapy with Kadcyla.     In mid 04/2020 she began adjuvant tamoxifen. In mid 03/2023, patient was switched to anastrozole with laboratory studies suggested post-menopausal state. With therapy, patient developed increased depression resulting in an increase in her sertraline dose.     Laboratory studies on 04/20/2023 showed premenopausal state and patient was switched back to tamoxifen.    History of Present Illness  Patient presents for scheduled follow up. She denies any self palpated masses. Mood is good and stable.     Performance Status   Karnofsky 100% - Normal, no complaints.    Past Medical History (historical data, reviewed by physician)  Breast cancer, left sided (as above);  hypothyroidism; depression.     Past Surgical History (historical data, reviewed by physician)  Right mastectomy and left mastectomy with sentinel lymph node biopsy (as above); hemorrhoidectomy;  x 2; LEEP after colposcopy (HPV+).     Family History (historical data, reviewed by physician)  Maternal grandfather with colorectal cancer age 55 years; mother with melanoma age 40s; no known family history of breast or ovarian cancer.     Social History (historical data, reviewed by physician)  Denies tobacco use; social alcohol use.       Current Medications    escitalopram 5 MG Oral Tab TAKE 1 TABLET BY MOUTH WITH DINNER FOR 2 WEEKS THEN TAKE 2 TABLETS BY MOUTH WITH DINNER      Blood Pressure Monitoring (BLOOD PRESSURE DIGITAL SOLN) Does not apply Kit       tamoxifen 20 MG Oral Tab Take 1 tablet (20 mg total) by mouth daily. 90 tablet 1    Tretinoin 0.1 % External Cream Apply topically nightly.      LEVOTHYROXINE 112 MCG Oral Tab TAKE 2 TABLETS BY MOUTH EVERY DAY *ONLY 60 LEFT ON PRESCRIPTION* 180 tablet 3    Cholecalciferol (VITAMIN D) 50 MCG (2000 UT) Oral Cap Take 2 capsules (4,000 Units total) by mouth daily.       Allergies   Ms. Ruiz is allergic to codeine [opioid analgesics].    Vital Signs   /69 (BP Location: Left arm, Patient Position: Sitting, Cuff Size: adult)   Pulse 65   Temp 97.8 °F (36.6 °C) (Tympanic)   Resp 14   Ht 1.676 m (5' 5.98\")   Wt 75.1 kg (165 lb 8 oz)   SpO2 98%   BMI 26.73 kg/m²     Physical Examination   Constitutional  Well developed and well nourished; in no apparent distress; appears close to chronological age.  Head   Normocephalic and atraumatic.  Eyes   Conjunctiva clear; sclera anicteric.  ENMT   External nose normal; external ears normal.   Neck   Supple; no masses.  Hematologic  No cervical, supraclavicular, axillary adenopathy.  Chest   Bilateral mastectomy without reconstruction. No chest wall masses.  Respiratory  Normal effort; no respiratory distress; clear  to auscultation bilaterally.  Cardiovascular  Regular rate and rhythm.   Abdomen  Soft; not tender; no masses; no hepatosplenomegaly.   Extremities  No lower extremity edema.  Neurologic  Motor and sensory grossly intact.  Psychiatric  Mood and affect appropriate.    Laboratory  No results found for this or any previous visit (from the past 168 hour(s)).    Impression and Plan   1.   Invasive ductal carcinoma, left breast: [id number]. Tumor was estrogen and progesterone receptor positive and Her2/samina positive. Based upon results of the Teresa study, patient started adjuvant Kadcyla on 03/30/2020. In mid 04/2020 she began adjuvant therapy with tamoxifen. In mid 03/2023, she was switched to anastrozole after laboratory studies suggested post-menopausal state; however, she switched back to tamoxifen on 04/20/2023 after labs showed premenopausal state.           Continue tamoxifen without modification. Given change in mood with anastrozole, I do not recommend ovarian suppression.     2.   Depression: Management as per primary care provider.     Patient will continue to receive longitudinal care by me for the complex care required for the cancer diagnosis including the expected complications related to anticancer therapy.    Planned Follow Up   Patient will return for follow up in 6 months.    Electronically signed by:    Antwan Puckett M.D.  System Medical Director of Oncology Services  [facility]

## 2024-06-10 ENCOUNTER — OFFICE VISIT (OUTPATIENT)
Dept: HEMATOLOGY/ONCOLOGY | Facility: HOSPITAL | Age: 54
End: 2024-06-10
Attending: SPECIALIST
Payer: COMMERCIAL

## 2024-06-10 VITALS
TEMPERATURE: 98 F | HEIGHT: 65.98 IN | HEART RATE: 65 BPM | SYSTOLIC BLOOD PRESSURE: 108 MMHG | RESPIRATION RATE: 14 BRPM | WEIGHT: 165.5 LBS | OXYGEN SATURATION: 98 % | DIASTOLIC BLOOD PRESSURE: 69 MMHG | BODY MASS INDEX: 26.6 KG/M2

## 2024-06-10 DIAGNOSIS — Z17.0 MALIGNANT NEOPLASM OF UPPER-OUTER QUADRANT OF LEFT BREAST IN FEMALE, ESTROGEN RECEPTOR POSITIVE (HCC): Primary | ICD-10-CM

## 2024-06-10 DIAGNOSIS — Z79.810 LONG-TERM CURRENT USE OF TAMOXIFEN: ICD-10-CM

## 2024-06-10 DIAGNOSIS — C50.412 MALIGNANT NEOPLASM OF UPPER-OUTER QUADRANT OF LEFT BREAST IN FEMALE, ESTROGEN RECEPTOR POSITIVE (HCC): Primary | ICD-10-CM

## 2024-06-10 DIAGNOSIS — F32.89 OTHER DEPRESSION: ICD-10-CM

## 2024-06-10 PROCEDURE — 99214 OFFICE O/P EST MOD 30 MIN: CPT | Performed by: SPECIALIST

## 2024-06-10 PROCEDURE — G2211 COMPLEX E/M VISIT ADD ON: HCPCS | Performed by: SPECIALIST

## 2024-06-10 NOTE — PROGRESS NOTES
Patient is here for MD follow up for Breast cancer. Patient is on Tamoxifen daily. Feeling well. Denies pain. No concerns.       Education Record    Learner:  Patient    Disease / Diagnosis:  Breast cancer     Barriers / Limitations:  None   Comments:    Method:  Discussion   Comments:    General Topics:  Plan of care reviewed   Comments:    Outcome:  Shows understanding   Comments:

## 2024-07-02 RX ORDER — TAMOXIFEN CITRATE 20 MG/1
20 TABLET ORAL DAILY
Qty: 90 TABLET | Refills: 2 | Status: SHIPPED | OUTPATIENT
Start: 2024-07-02

## 2024-08-13 ENCOUNTER — APPOINTMENT (OUTPATIENT)
Dept: HEMATOLOGY/ONCOLOGY | Facility: HOSPITAL | Age: 54
End: 2024-08-13
Attending: SPECIALIST
Payer: COMMERCIAL

## 2024-12-09 ENCOUNTER — APPOINTMENT (OUTPATIENT)
Dept: HEMATOLOGY/ONCOLOGY | Facility: HOSPITAL | Age: 54
End: 2024-12-09
Attending: SPECIALIST
Payer: COMMERCIAL

## 2025-01-21 ENCOUNTER — OFFICE VISIT (OUTPATIENT)
Age: 55
End: 2025-01-21
Attending: SPECIALIST
Payer: COMMERCIAL

## 2025-01-21 VITALS
OXYGEN SATURATION: 98 % | BODY MASS INDEX: 27.16 KG/M2 | SYSTOLIC BLOOD PRESSURE: 118 MMHG | HEIGHT: 65.98 IN | HEART RATE: 77 BPM | RESPIRATION RATE: 16 BRPM | DIASTOLIC BLOOD PRESSURE: 75 MMHG | WEIGHT: 169 LBS | TEMPERATURE: 97 F

## 2025-01-21 DIAGNOSIS — Z17.0 MALIGNANT NEOPLASM OF UPPER-OUTER QUADRANT OF LEFT BREAST IN FEMALE, ESTROGEN RECEPTOR POSITIVE (HCC): Primary | ICD-10-CM

## 2025-01-21 DIAGNOSIS — Z79.810 ENCOUNTER FOR MONITORING TAMOXIFEN THERAPY: ICD-10-CM

## 2025-01-21 DIAGNOSIS — Z85.3 ENCOUNTER FOR FOLLOW-UP SURVEILLANCE OF BREAST CANCER: ICD-10-CM

## 2025-01-21 DIAGNOSIS — Z08 ENCOUNTER FOR FOLLOW-UP SURVEILLANCE OF BREAST CANCER: ICD-10-CM

## 2025-01-21 DIAGNOSIS — F32.89 OTHER DEPRESSION: ICD-10-CM

## 2025-01-21 DIAGNOSIS — C50.412 MALIGNANT NEOPLASM OF UPPER-OUTER QUADRANT OF LEFT BREAST IN FEMALE, ESTROGEN RECEPTOR POSITIVE (HCC): Primary | ICD-10-CM

## 2025-01-21 DIAGNOSIS — Z51.81 ENCOUNTER FOR MONITORING TAMOXIFEN THERAPY: ICD-10-CM

## 2025-01-21 DIAGNOSIS — Z79.810 LONG-TERM CURRENT USE OF TAMOXIFEN: ICD-10-CM

## 2025-01-21 RX ORDER — ESCITALOPRAM OXALATE 20 MG/1
20 TABLET ORAL DAILY
COMMUNITY
Start: 2024-12-04

## 2025-01-21 NOTE — PROGRESS NOTES
Patient is here for 6 month MD follow up for Breast cancer. Hx of bilateral mastectomy. On Tamoxifen. Feeling well. No concerns.       Education Record    Learner:  Patient    Disease / Diagnosis:  Breast cancer     Barriers / Limitations:  None   Comments:    Method:  Discussion   Comments:    General Topics:  Plan of care reviewed   Comments:    Outcome:  Shows understanding   Comments:

## 2025-01-26 NOTE — PROGRESS NOTES
[facility] Progress Note       Patient Name: Mee Ruiz   YOB: 1970  Medical Record Number: [id number]  Attending Physician: Antwan Puckett M.D.     The 21st Century Cures Act makes medical notes like these available to patients in the interest of transparency. Please be advised this is a medical document. Medical documents are intended to carry relevant information, facts as evident, and the clinical opinion of the practitioner. The medical note is intended as peer to peer communication and may appear blunt or direct. It is written in medical language and may contain abbreviations or verbiage that are unfamiliar.     Date of Visit: 1/21/2025     Chief Complaint  Invasive ductal carcinoma, left breast, upper outer quadrant - follow up.    Oncologic History  Mee Ruiz is a 54 year old female who on 09/09/2019 underwent screening mammography which showed an area of distortion in the upper outer quadrant of the left breast. Ultrasound of the left breast on the same day showed a suspicious 1.6 x 1.6 x 1.6 cm mass at the 1 o'clock position. At the 3 o'clock, ultrasound showed a second 0.7 x 0.3 x 0.7 cm circumscribed, hypoechoic mass. Visualized lymph nodes appeared unremarkable.     Ultrasound guided biopsy of the 1 o' clock mass showed grade 2 invasive ductal carcinoma and associated ductal carcinoma-in-situ. The tumor was estrogen receptor 85% positive, progesterone receptor 75% positive, and Her2/samina 3+ (90%) positive by IHC and had a Ki67 of 20%. Breast MRI on 09/27/2019 showed a 2.5 x 2.0 x 2.0 cm irregular mass at the 1 o'clock position of the left breast; a subcentimeter oval mass at he 3 o'clock position of the left breast without suspicious characteristics; and a 1.6 cm mass vs non mass enhancement at the 12 o'clock position of the right breast; and no axillary adenopathy. On 10/04/2019 patient underwent MRI guided biopsy of two sites in the right  breast which were negative for malignancy.     Patient was premenopausal at diagnosis.     On 10/14/2019 patient began neoadjuvant chemotherapy with docetaxel, carboplatin, trastuzumab, and pertuzumab (TCHP). Cycle 1 was complicated by diarrhea and transient GERD. Cycle 2 was started on 11/04/2019 and was complicated by diarrhea. Cycle 3 was started on 11/25/2019 and was complicated by less diarrhea as patient started using Imodium regularly. Cycle 4 was started on 12/16/2019 and was complicated by diarrhea and superficial fungal infection. Cycle 5 was started on 01/06/2020 and was complicated by diarrhea and superficial fungal infection. Cycle 6 was started on 01/27/2020 and was complicated by diarrhea.     On 02/17/2020 patient received only pertuzumab and trastuzumab.     On 02/20/2020 patient underwent right mastectomy with left mastectomy with sentinel lymph node biopsy. She declined reconstruction. Pathology showed no malignancy in the right breast. Pathology from the left side showed residual grade 2 invasive ductal carcinoma measuring 0.9 cm and associated grade 2 ductal carcinoma in situ; all surgical margins were negative; one sentinel lymph node was negative for metastasis (0/1).     On 03/30/2020 patient began adjuvant therapy with Kadcyla.     In mid 04/2020 she began adjuvant tamoxifen. In mid 03/2023, patient was switched to anastrozole with laboratory studies suggested post-menopausal state. With therapy, patient developed increased depression resulting in an increase in her sertraline dose.     Laboratory studies on 04/20/2023 showed premenopausal state and patient was switched back to tamoxifen.    History of Present Illness  Patient presents for scheduled follow up. She denies any self palpated masses. Mood is good and stable.     Performance Status   Karnofsky 100% - Normal, no complaints.    Past Medical History (historical data, reviewed by physician)  Breast cancer, left sided (as above);  hypothyroidism; depression.     Past Surgical History (historical data, reviewed by physician)  Right mastectomy and left mastectomy with sentinel lymph node biopsy (as above); hemorrhoidectomy;  x 2; LEEP after colposcopy (HPV+).     Family History (historical data, reviewed by physician)  Maternal grandfather with colorectal cancer age 55 years; mother with melanoma age 40s; no known family history of breast or ovarian cancer.     Social History (historical data, reviewed by physician)  Denies tobacco use; social alcohol use.       Current Medications    escitalopram 20 MG Oral Tab Take 1 tablet (20 mg total) by mouth daily.      tamoxifen 20 MG Oral Tab Take 1 tablet (20 mg total) by mouth daily. 90 tablet 2    Blood Pressure Monitoring (BLOOD PRESSURE DIGITAL SOLN) Does not apply Kit       Tretinoin 0.1 % External Cream Apply topically nightly.      LEVOTHYROXINE 112 MCG Oral Tab TAKE 2 TABLETS BY MOUTH EVERY DAY *ONLY 60 LEFT ON PRESCRIPTION* 180 tablet 3    Cholecalciferol (VITAMIN D) 50 MCG (2000 UT) Oral Cap Take 2 capsules (4,000 Units total) by mouth daily.       Allergies   Ms. Ruiz is allergic to codeine [opioid analgesics].    Vital Signs   /75 (BP Location: Left arm, Cuff Size: adult)   Pulse 77   Temp 97.2 °F (36.2 °C) (Tympanic)   Resp 16   Ht 1.676 m (5' 5.98\")   Wt 76.7 kg (169 lb)   SpO2 98%   BMI 27.29 kg/m²     Physical Examination   Constitutional  Well developed and well nourished; in no apparent distress; appears close to chronological age.  Head   Normocephalic and atraumatic.  Eyes   Conjunctiva clear; sclera anicteric.  ENMT   External nose normal; external ears normal.   Neck   Supple; no masses.  Hematologic  No cervical, supraclavicular, axillary adenopathy.  Chest   Bilateral mastectomy without reconstruction. No chest wall masses.  Respiratory  Normal effort; no respiratory distress; clear to auscultation bilaterally.  Cardiovascular  Regular rate and rhythm.    Abdomen  Soft; not tender; no masses; no hepatosplenomegaly.   Extremities  No lower extremity edema.  Neurologic  Motor and sensory grossly intact.  Psychiatric  Mood and affect appropriate.    Laboratory  No results found for this or any previous visit (from the past week).    Impression and Plan   1.   Invasive ductal carcinoma, left breast: [id number]. Tumor was estrogen and progesterone receptor positive and Her2/samina positive. Based upon results of the Teresa study, patient started adjuvant Kadcyla on 03/30/2020. In mid 04/2020 she began adjuvant therapy with tamoxifen. In mid 03/2023, she was switched to anastrozole after laboratory studies suggested post-menopausal state; however, she switched back to tamoxifen on 04/20/2023 after labs showed premenopausal state.           Continue tamoxifen without modification. Given change in mood with anastrozole, I do not recommend ovarian suppression if patient is still premenopausal.           Survivorship issues were addressed with the patient. No issues were identified by the patient.      2.   Depression: Management as per primary care provider.     Planned Follow Up   Patient will return for follow up in 6 months.    Electronically Signed by:     Antwan Puckett M.D.  System Medical Director, Oncology Services  [facility]

## 2025-03-22 RX ORDER — TAMOXIFEN CITRATE 20 MG/1
20 TABLET ORAL DAILY
Qty: 90 TABLET | Refills: 1 | Status: SHIPPED | OUTPATIENT
Start: 2025-03-22

## 2025-07-21 NOTE — PROGRESS NOTES
[facility] Progress Note       Patient Name: Mee Ruiz   YOB: 1970  Medical Record Number: [id number]  Attending Physician: Antwan Puckett M.D.     The 21st Century Cures Act makes medical notes like these available to patients in the interest of transparency. Please be advised this is a medical document. Medical documents are intended to carry relevant information, facts as evident, and the clinical opinion of the practitioner. The medical note is intended as peer to peer communication and may appear blunt or direct. It is written in medical language and may contain abbreviations or verbiage that are unfamiliar.     Date of Visit: 7/22/2025    Chief Complaint  Invasive ductal carcinoma, left breast, upper outer quadrant - follow up.    Oncologic History  Mee Ruiz is a 54 year old female who on 09/09/2019 underwent screening mammography which showed an area of distortion in the upper outer quadrant of the left breast. Ultrasound of the left breast on the same day showed a suspicious 1.6 x 1.6 x 1.6 cm mass at the 1 o'clock position. At the 3 o'clock, ultrasound showed a second 0.7 x 0.3 x 0.7 cm circumscribed, hypoechoic mass. Visualized lymph nodes appeared unremarkable.     Ultrasound guided biopsy of the 1 o' clock mass showed grade 2 invasive ductal carcinoma and associated ductal carcinoma-in-situ. The tumor was estrogen receptor 85% positive, progesterone receptor 75% positive, and Her2/samina 3+ (90%) positive by IHC and had a Ki67 of 20%. Breast MRI on 09/27/2019 showed a 2.5 x 2.0 x 2.0 cm irregular mass at the 1 o'clock position of the left breast; a subcentimeter oval mass at he 3 o'clock position of the left breast without suspicious characteristics; and a 1.6 cm mass vs non mass enhancement at the 12 o'clock position of the right breast; and no axillary adenopathy. On 10/04/2019 patient underwent MRI guided biopsy of two sites in the right  breast which were negative for malignancy.     Patient was premenopausal at diagnosis.     On 10/14/2019 patient began neoadjuvant chemotherapy with docetaxel, carboplatin, trastuzumab, and pertuzumab (TCHP). Cycle 1 was complicated by diarrhea and transient GERD. Cycle 2 was started on 11/04/2019 and was complicated by diarrhea. Cycle 3 was started on 11/25/2019 and was complicated by less diarrhea as patient started using Imodium regularly. Cycle 4 was started on 12/16/2019 and was complicated by diarrhea and superficial fungal infection. Cycle 5 was started on 01/06/2020 and was complicated by diarrhea and superficial fungal infection. Cycle 6 was started on 01/27/2020 and was complicated by diarrhea.     On 02/17/2020 patient received only pertuzumab and trastuzumab.     On 02/20/2020 patient underwent right mastectomy with left mastectomy with sentinel lymph node biopsy. She declined reconstruction. Pathology showed no malignancy in the right breast. Pathology from the left side showed residual grade 2 invasive ductal carcinoma measuring 0.9 cm and associated grade 2 ductal carcinoma in situ; all surgical margins were negative; one sentinel lymph node was negative for metastasis (0/1).     On 03/30/2020 patient began adjuvant therapy with Kadcyla.     In mid 04/2020 she began adjuvant tamoxifen. In mid 03/2023, patient was switched to anastrozole with laboratory studies suggested post-menopausal state. With therapy, patient developed increased depression resulting in an increase in her sertraline dose.     Laboratory studies on 04/20/2023 showed premenopausal state and patient was switched back to tamoxifen.    History of Present Illness  Patient presents for scheduled follow up. She denies any self palpated masses. Mood is good and stable.     Performance Status   Karnofsky 100% - Normal, no complaints.    Past Medical History (historical data, reviewed by physician)  Breast cancer, left sided (as above);  hypothyroidism; depression.     Past Surgical History (historical data, reviewed by physician)  Right mastectomy and left mastectomy with sentinel lymph node biopsy (as above); hemorrhoidectomy;  x 2; LEEP after colposcopy (HPV+).     Family History (historical data, reviewed by physician)  Maternal grandfather with colorectal cancer age 55 years; mother with melanoma age 40s; no known family history of breast or ovarian cancer.     Social History (historical data, reviewed by physician)  Denies tobacco use; social alcohol use.       Current Medications    levothyroxine 175 MCG Oral Tab Take 1 tablet (175 mcg total) by mouth before breakfast.      SYNTHROID 200 MCG Oral Tab Take 1 tablet (200 mcg total) by mouth before breakfast.      tamoxifen 20 MG Oral Tab Take 1 tablet (20 mg total) by mouth daily. 90 tablet 2    escitalopram 20 MG Oral Tab Take 1 tablet (20 mg total) by mouth in the morning.      Cholecalciferol (VITAMIN D) 50 MCG (2000 UT) Oral Cap Take 2 capsules (4,000 Units total) by mouth in the morning.       Allergies   Ms. Ruiz is allergic to codeine [opioid analgesics].    Vital Signs   /70 (BP Location: Left arm, Patient Position: Sitting, Cuff Size: large)   Pulse 83   Temp 98.1 °F (36.7 °C) (Temporal)   Resp 18   Ht 1.676 m (5' 5.98\")   Wt 77.6 kg (171 lb)   SpO2 97%   BMI 27.61 kg/m²     Physical Examination   Constitutional  Well developed and well nourished; in no apparent distress.  Head   Normocephalic and atraumatic.  Eyes   Conjunctiva clear; sclera anicteric.  ENMT   External nose normal; external ears normal.   Neck   Supple; no masses.  Hematologic  No cervical, supraclavicular, axillary adenopathy.  Chest   Bilateral mastectomy without reconstruction. No chest wall masses.  Respiratory  Normal effort; no respiratory distress; clear to auscultation bilaterally.  Cardiovascular  Regular rate and rhythm.   Abdomen  Soft; not tender; no masses; no hepatosplenomegaly.    Extremities  No lower extremity edema.  Neurologic  Motor and sensory grossly intact.  Psychiatric  Mood and affect appropriate.    Laboratory  No results found for this or any previous visit (from the past week).    Impression and Plan   1.   Invasive ductal carcinoma, left breast: [id number]. Tumor was estrogen and progesterone receptor positive and Her2/samina positive. Based upon results of the Teresa study, patient started adjuvant Kadcyla on 03/30/2020. In mid 04/2020 she began adjuvant therapy with tamoxifen. In mid 03/2023, she was switched to anastrozole after laboratory studies suggested post-menopausal state; however, she switched back to tamoxifen on 04/20/2023 after labs showed premenopausal state.           Patient has completed 5 years of adjuvant endocrine therapy. We discussed the data of extended endocrine therapy and patient is amenable to treatment. We will plan to complete at least 7 years of therapy. Given change in mood with anastrozole, I do not recommend ovarian suppression if patient is still premenopausal.           Survivorship issues were addressed with the patient. No issues were identified by the patient.      2.   Depression: Management as per primary care provider.     Planned Follow Up   Patient will return for follow up in 04/2026.     Electronically Signed by:     Antwan Puckett M.D.  System Medical Director, Oncology Services  [facility]

## 2025-07-22 ENCOUNTER — OFFICE VISIT (OUTPATIENT)
Facility: LOCATION | Age: 55
End: 2025-07-22
Attending: SPECIALIST
Payer: COMMERCIAL

## 2025-07-22 VITALS
HEART RATE: 83 BPM | SYSTOLIC BLOOD PRESSURE: 112 MMHG | DIASTOLIC BLOOD PRESSURE: 70 MMHG | BODY MASS INDEX: 27.48 KG/M2 | TEMPERATURE: 98 F | RESPIRATION RATE: 18 BRPM | HEIGHT: 65.98 IN | OXYGEN SATURATION: 97 % | WEIGHT: 171 LBS

## 2025-07-22 DIAGNOSIS — Z08 ENCOUNTER FOR FOLLOW-UP SURVEILLANCE OF BREAST CANCER: ICD-10-CM

## 2025-07-22 DIAGNOSIS — Z51.81 ENCOUNTER FOR MONITORING TAMOXIFEN THERAPY: ICD-10-CM

## 2025-07-22 DIAGNOSIS — Z85.3 ENCOUNTER FOR FOLLOW-UP SURVEILLANCE OF BREAST CANCER: ICD-10-CM

## 2025-07-22 DIAGNOSIS — Z79.810 LONG-TERM CURRENT USE OF TAMOXIFEN: ICD-10-CM

## 2025-07-22 DIAGNOSIS — C50.412 MALIGNANT NEOPLASM OF UPPER-OUTER QUADRANT OF LEFT BREAST IN FEMALE, ESTROGEN RECEPTOR POSITIVE (HCC): Primary | ICD-10-CM

## 2025-07-22 DIAGNOSIS — Z79.810 ENCOUNTER FOR MONITORING TAMOXIFEN THERAPY: ICD-10-CM

## 2025-07-22 DIAGNOSIS — Z17.0 MALIGNANT NEOPLASM OF UPPER-OUTER QUADRANT OF LEFT BREAST IN FEMALE, ESTROGEN RECEPTOR POSITIVE (HCC): Primary | ICD-10-CM

## 2025-07-22 RX ORDER — TAMOXIFEN CITRATE 20 MG/1
20 TABLET ORAL DAILY
Qty: 90 TABLET | Refills: 2 | Status: SHIPPED | OUTPATIENT
Start: 2025-07-22

## 2025-07-22 RX ORDER — LEVOTHYROXINE SODIUM 200 MCG
200 TABLET ORAL
COMMUNITY
Start: 2025-06-15

## 2025-07-22 RX ORDER — LEVOTHYROXINE SODIUM 175 UG/1
175 TABLET ORAL
COMMUNITY
Start: 2025-07-10

## 2025-07-22 NOTE — PROGRESS NOTES
Patient is here for 6 month MD follow up for Breast cancer. Patient continues on Tamoxifen. Tolerating well. Denies hot flashes, joint pain or night sweats. Hx of bilateral mastectomy. Breast exam due today. Denies pain. Feeling well. Appetite and energy level are good.     Education Record    Learner:  Patient    Disease / Diagnosis: Breast cancer    Barriers / Limitations:  None   Comments:    Method:  Discussion   Comments:    General Topics:  Plan of care reviewed   Comments:    Outcome:  Shows understanding   Comments:       Please renew pioglitazone 45 mg 1 p.o. daily for Mrs. Jennie Henriquez.

## (undated) DEVICE — SUTURE VICRYL 3-0 SH

## (undated) DEVICE — KENDALL SCD EXPRESS SLEEVES, KNEE LENGTH, MEDIUM: Brand: KENDALL SCD

## (undated) DEVICE — DRAIN CHANNEL 19FR BLAKE

## (undated) DEVICE — SUTURE SILK 0 FSL

## (undated) DEVICE — GAMMEX® NON-LATEX PI TEXTURED SIZE 7.5, STERILE POLYISOPRENE POWDER-FREE SURGICAL GLOVE: Brand: GAMMEX

## (undated) DEVICE — DRAIN RELIAVAC 100CC

## (undated) DEVICE — SHEATH, GUIDE, SAVI SCOUT®: Brand: SAVI SCOUT®

## (undated) DEVICE — 3M™ STERI-STRIP™ REINFORCED ADHESIVE SKIN CLOSURES, R1548, 1 IN X 5 IN (25 MM X 125 MM), 4 STRIPS/ENVELOPE: Brand: 3M™ STERI-STRIP™

## (undated) DEVICE — REVEAL DISTAL ATTACHMENT CAP

## (undated) DEVICE — SYRINGE 10ML LL TIP

## (undated) DEVICE — CONT SPEC SURG FAXITRON WEDGE

## (undated) DEVICE — SUPER SPONGES,MEDIUM: Brand: KERLIX

## (undated) DEVICE — SUTURE CHROMIC GUT 2-0 SH

## (undated) DEVICE — RECTAL CDS-LF: Brand: MEDLINE INDUSTRIES, INC.

## (undated) DEVICE — STERILE (15.2 X 244CM) POLYETHYLENE TELESCOPICALLY-FOLDED COVER WITH ATTACHED (3CM) NEOGUARD™ TIP: Brand: SURGI-TIP TRANSDUCER COVER

## (undated) DEVICE — SUTURE VICRYL 5-0 PC-1

## (undated) DEVICE — MEDI-VAC NON-CONDUCTIVE SUCTION TUBING 6MM X 1.8M (6FT.) L: Brand: CARDINAL HEALTH

## (undated) DEVICE — SNARE 9MM 230CM 2.4MM EXACTO

## (undated) DEVICE — SOL  .9 1000ML BTL

## (undated) DEVICE — BREAST-HERNIA-PORT CDS-LF: Brand: MEDLINE INDUSTRIES, INC.

## (undated) DEVICE — SNARE CAPTIFLEX MICRO-OVL OLY

## (undated) DEVICE — PANTS KNIT WASHABLE 2/3XL

## (undated) DEVICE — GOWN,SIRUS,FABRIC-REINFORCED,X-LARGE: Brand: MEDLINE

## (undated) DEVICE — STERILE SYNTHETIC POLYISOPRENE POWDER-FREE SURGICAL GLOVES WITH HYDROGEL COATING: Brand: PROTEXIS

## (undated) DEVICE — LIGHT HANDLE

## (undated) DEVICE — GAUZE SPONGES,USP TYPE VII GAUZE, 12 PLY: Brand: CURITY

## (undated) DEVICE — SNARE OPTMZ PLPCTM TRP

## (undated) DEVICE — VIOLET BRAIDED (POLYGLACTIN 910), SYNTHETIC ABSORBABLE SUTURE: Brand: COATED VICRYL

## (undated) DEVICE — 3M(TM) MICROPORE TAPE DISPENSER 1535-2: Brand: 3M™ MICROPORE™

## (undated) DEVICE — KIT ENDO ORCAPOD 160/180/190

## (undated) DEVICE — DRAPE C-ARM UNIVERSAL

## (undated) DEVICE — BRA SURGICAL ELIZABETH PINK L

## (undated) DEVICE — C-ARM: Brand: UNBRANDED

## (undated) DEVICE — UNDYED BRAIDED (POLYGLACTIN 910), SYNTHETIC ABSORBABLE SUTURE: Brand: COATED VICRYL

## (undated) DEVICE — PAD ULNAR NERVE PROTECTOR

## (undated) DEVICE — ADHESIVE MASTISOL 2/3CC VL

## (undated) DEVICE — SUTURE VICRYL 4-0 PC-1

## (undated) DEVICE — SOLUTION ANSEP 70% ISOPRPNL

## (undated) DEVICE — SUTURE ETHILON 2-0 FS

## (undated) DEVICE — LINE MNTR ADLT SET O2 INTMD

## (undated) DEVICE — KIT CLEAN ENDOKIT 1.1OZ GOWNX2

## (undated) NOTE — MR AVS SNAPSHOT
[address]  [phone]                    After Visit Summary   12/28/2020    Lola Lr    MRN: [id number]           Visit Information     Date & Time  12/28/2020  8:00 Future Appointments Provider Department Dept Phone    3/8/2021 2:00 PM MD Cesilia Larsonkimberly [facility] [phone]        3/22/2021 9:45 AM Aidee Lu MD [facility] in [address] [place] [address] Neutrophil Absolute Prelim 2.39      1.50 - 7.70 x10 (3) uL Final    Neutrophil Absolute 2.39      1.50 - 7.70 x10(3) uL Final    Lymphocyte Absolute 1.44      1.00 - 4.00 x10(3) uL Final    Monocyte Absolute 0.30      0.10 - 1.00 x10(3) uL Final    Eosin

## (undated) NOTE — LETTER
Printed: 10/8/2019    Patient Name: Audrey Craft  : 1970   Medical Record #: [id number]    Consent to [address] 72, understand that I have been diagnosed with breast cancer.     I understand that the treatment sug I understand that I could have side effects from my treatment that are not listed on this form. Each patient can respond differently to medications, and could have side effects that have not been reported by others.  I understand that complications from th and have answered any such questions.       Date:__________  Time:________ Signed:_________________________________      Patient Name: Cristhian Gallardo  : 1970   Medical Record #: [id number]

## (undated) NOTE — Clinical Note
I had the pleasure of seeing Umesh Silver on 10/25/2021. Please see my attached note.     Paulino Hawthorne MD FACS  [facility]

## (undated) NOTE — Clinical Note
I had the pleasure of seeing Fritz Blades on 9/27/2019. Please see my attached note. Vanessa Horvath MD [facility]--Surgery

## (undated) NOTE — Clinical Note
I had the pleasure of seeing Delores Fuller on 3/23/2020. Please see my attached note. Dena Liu MD [facility]--Surgery

## (undated) NOTE — LETTER
[facility]  Administration of Anesthesia  1.  Aretta Setting, or _________________________________ acting on her behalf, (Patient) (Dependent/Representative) request to receive anesthesia for my pending procedure/operation/treatment spinal bleeding, seizure, cardiac arrest and death. 7. AWARENESS: I understand that it is possible (but unlikely) to have explicit memory of events from the operating room while under general anesthesia.   8. ELECTROCONVULSIVE THERAPY PATIENTS: This consen signature below affirms that prior to the time of the procedure, I have explained to the patient and/or his/her guardian, the risks and benefits of undergoing anesthesia, as well as any reasonable alternatives.     __________________________________________

## (undated) NOTE — LETTER
[facility], [place]  Authorization for Invasive Procedures  1. I hereby authorize Dr. Carrie Mandujano, my physician and whomever may be designated as the doctor's assistant, to perform the following operation and/or procedure:   Co fever and allergic reactions, hemolytic reactions, transmission of disease such as hepatitis, AIDS, cytomegalovirus (CMV), and flluid overload.  In the event that I wish to have autologous transfusions of my own blood, or a directed donor transfusion, I ifeoma Signature of Patient:  ________________________________________________ Date: _________Time: _________    Responsible person in case of minor or unconscious: _____________________________Relationship: ____________     Witness Signature: _______________

## (undated) NOTE — LETTER
[address] [address]  [place], [address] [facility]    Consent for Operation  Date: __________________                                Time: _______________    1.  I authorize the performance upon Odin Anaya the following operation:  Procedur procedure has been videotaped, the surgeon will obtain the original videotape. The hospital will not be responsible for storage or maintenance of this tape.   7. For the purpose of advancing medical education, I consent to the admittance of observers to the STATEMENTS REQUIRING INSERTION OR COMPLETION WERE FILLED IN.     Signature of Patient:   ___________________________    When the patient is a minor or mentally incompetent to give consent:  Signature of person authorized to consent for patient: ____________ supplements, and pills I can buy without a prescription (including street drugs/illegal medications). Failure to inform my anesthesiologist about these medicines may increase my risk of anesthetic complications. iv.  If I am allergic to anything or have ha Anesthesiologist Signature     Date   Time  I have discussed the procedure and information above with the patient (or patient’s representative) and answered their questions. The patient or their representative has agreed to have anesthesia services.     ___

## (undated) NOTE — LETTER
Printed: 3/30/2020    Patient Name: Tanmay Maurice  : 1970   Medical Record #: [id number]    Consent to [address] [place] [address], understand that I have been diagnosed with breast cancer    I understand that the treatmen Additionally, I will receive a copy of this consent form. I have read and fully understand this consent to cancer treatment. I acknowledge that the explanations above were made.  The physicians have answered all my questions and I consent to the use of M

## (undated) NOTE — Clinical Note
I had the pleasure of seeing Kandi Vasquez on 2/25/2020. Please see my attached note. Vanessa Horvath MD [facility]--Surgery

## (undated) NOTE — Clinical Note
Finished with her chemo. Referred her to you but doesn't look like appt made. Giving you heads up. Thanks.

## (undated) NOTE — Clinical Note
I had the pleasure of seeing Estella Singh on 3/9/2020. Please see my attached note. Elizabeth Perez MD [facility]Surgery

## (undated) NOTE — Clinical Note
I had the pleasure of seeing Audrey Craft on 9/30/2020. Please see my attached note.     Kimberly Canavan, MD FACS  [facility]

## (undated) NOTE — LETTER
[address] [place] [address] Rd, [place], [place]       INFORMED REFUSAL FOR TREATMENT / PROCEDURE / TESTING      I have been advised by Dr. Param Perez that it is necessary for me to undergo the following treatment, procedure or testing.   The mar

## (undated) NOTE — Clinical Note
I had the pleasure of seeing Chela Holbrook on 4/25/2022. Please see my attached note.     Arvin Degroot MD FACS  [facility]

## (undated) NOTE — Clinical Note
I had the pleasure of seeing Faiza Meyer on 4/23/2021. Please see my attached note.     Elizabeth Escamilla MD FACS  [facility]

## (undated) NOTE — ED AVS SNAPSHOT
Shelby Debby   MRN: [id number]    Department:  [facility] Emergency Department   Date of Visit:  11/11/2019           Disclosure     Insurance plans vary and the physician(s) referred by the ER may not be covered by your plan.  Please contact y tell this physician (or your personal doctor if your instructions are to return to your personal doctor) about any new or lasting problems. The primary care or specialist physician will see patients referred from the [facility] Emergency Department.  Cheryle Levins